# Patient Record
Sex: FEMALE | Race: WHITE | NOT HISPANIC OR LATINO | Employment: OTHER | URBAN - METROPOLITAN AREA
[De-identification: names, ages, dates, MRNs, and addresses within clinical notes are randomized per-mention and may not be internally consistent; named-entity substitution may affect disease eponyms.]

---

## 2017-01-09 ENCOUNTER — OFFICE VISIT - HEALTHEAST (OUTPATIENT)
Dept: FAMILY MEDICINE | Facility: CLINIC | Age: 62
End: 2017-01-09

## 2017-01-09 ENCOUNTER — COMMUNICATION - HEALTHEAST (OUTPATIENT)
Dept: FAMILY MEDICINE | Facility: CLINIC | Age: 62
End: 2017-01-09

## 2017-01-09 DIAGNOSIS — I10 ESSENTIAL HYPERTENSION: ICD-10-CM

## 2017-01-09 DIAGNOSIS — E66.01 MORBID OBESITY (H): ICD-10-CM

## 2017-01-09 DIAGNOSIS — R73.02 IMPAIRED GLUCOSE TOLERANCE: ICD-10-CM

## 2017-01-09 DIAGNOSIS — Z00.00 HEALTH CARE MAINTENANCE: ICD-10-CM

## 2017-01-09 DIAGNOSIS — J34.89 NASAL VESTIBULITIS: ICD-10-CM

## 2017-01-09 DIAGNOSIS — D12.6 BENIGN NEOPLASM OF COLON: ICD-10-CM

## 2017-01-09 DIAGNOSIS — E11.9 DIABETES (H): ICD-10-CM

## 2017-01-09 LAB — HBA1C MFR BLD: 6.4 % (ref 3.5–6)

## 2017-01-09 ASSESSMENT — MIFFLIN-ST. JEOR: SCORE: 1661.62

## 2017-01-15 ENCOUNTER — COMMUNICATION - HEALTHEAST (OUTPATIENT)
Dept: FAMILY MEDICINE | Facility: CLINIC | Age: 62
End: 2017-01-15

## 2017-02-14 ENCOUNTER — AMBULATORY - HEALTHEAST (OUTPATIENT)
Dept: PODIATRY | Age: 62
End: 2017-02-14

## 2017-02-14 DIAGNOSIS — L60.2 ONYCHAUXIS: ICD-10-CM

## 2017-02-14 DIAGNOSIS — E11.42 TYPE 2 DIABETES MELLITUS WITH DIABETIC POLYNEUROPATHY, WITHOUT LONG-TERM CURRENT USE OF INSULIN (H): ICD-10-CM

## 2017-02-14 DIAGNOSIS — M79.673 PAIN OF FOOT, UNSPECIFIED LATERALITY: ICD-10-CM

## 2017-02-25 ENCOUNTER — COMMUNICATION - HEALTHEAST (OUTPATIENT)
Dept: FAMILY MEDICINE | Facility: CLINIC | Age: 62
End: 2017-02-25

## 2017-02-25 DIAGNOSIS — I10 ESSENTIAL HYPERTENSION: ICD-10-CM

## 2017-02-26 ENCOUNTER — RECORDS - HEALTHEAST (OUTPATIENT)
Dept: ADMINISTRATIVE | Facility: OTHER | Age: 62
End: 2017-02-26

## 2017-03-03 ENCOUNTER — OFFICE VISIT - HEALTHEAST (OUTPATIENT)
Dept: FAMILY MEDICINE | Facility: CLINIC | Age: 62
End: 2017-03-03

## 2017-03-03 ENCOUNTER — COMMUNICATION - HEALTHEAST (OUTPATIENT)
Dept: FAMILY MEDICINE | Facility: CLINIC | Age: 62
End: 2017-03-03

## 2017-03-03 DIAGNOSIS — T21.22XA: ICD-10-CM

## 2017-03-03 DIAGNOSIS — E11.9 TYPE 2 DIABETES MELLITUS (H): ICD-10-CM

## 2017-03-03 ASSESSMENT — MIFFLIN-ST. JEOR: SCORE: 1635.82

## 2017-05-27 ENCOUNTER — COMMUNICATION - HEALTHEAST (OUTPATIENT)
Dept: FAMILY MEDICINE | Facility: CLINIC | Age: 62
End: 2017-05-27

## 2017-05-27 DIAGNOSIS — E78.00 HYPERCHOLESTEREMIA: ICD-10-CM

## 2017-05-31 ENCOUNTER — OFFICE VISIT - HEALTHEAST (OUTPATIENT)
Dept: OTOLARYNGOLOGY | Facility: CLINIC | Age: 62
End: 2017-05-31

## 2017-05-31 DIAGNOSIS — J31.0 CHRONIC RHINITIS: ICD-10-CM

## 2017-05-31 RX ORDER — FEXOFENADINE HCL 60 MG/1
60 TABLET, FILM COATED ORAL DAILY
Status: SHIPPED | COMMUNITY
Start: 2017-05-31

## 2017-05-31 ASSESSMENT — MIFFLIN-ST. JEOR: SCORE: 1633.84

## 2017-06-20 ENCOUNTER — AMBULATORY - HEALTHEAST (OUTPATIENT)
Dept: PODIATRY | Age: 62
End: 2017-06-20

## 2017-06-20 DIAGNOSIS — E11.42 TYPE 2 DIABETES MELLITUS WITH DIABETIC POLYNEUROPATHY, WITHOUT LONG-TERM CURRENT USE OF INSULIN (H): ICD-10-CM

## 2017-06-20 DIAGNOSIS — L60.2 ONYCHAUXIS: ICD-10-CM

## 2017-06-20 DIAGNOSIS — M79.673 PAIN OF FOOT, UNSPECIFIED LATERALITY: ICD-10-CM

## 2017-06-28 ENCOUNTER — COMMUNICATION - HEALTHEAST (OUTPATIENT)
Dept: FAMILY MEDICINE | Facility: CLINIC | Age: 62
End: 2017-06-28

## 2017-06-28 DIAGNOSIS — I10 HTN (HYPERTENSION): ICD-10-CM

## 2017-07-20 ENCOUNTER — COMMUNICATION - HEALTHEAST (OUTPATIENT)
Dept: FAMILY MEDICINE | Facility: CLINIC | Age: 62
End: 2017-07-20

## 2017-07-26 ENCOUNTER — COMMUNICATION - HEALTHEAST (OUTPATIENT)
Dept: FAMILY MEDICINE | Facility: CLINIC | Age: 62
End: 2017-07-26

## 2017-07-26 DIAGNOSIS — I10 HTN (HYPERTENSION): ICD-10-CM

## 2017-07-26 DIAGNOSIS — I10 ESSENTIAL HYPERTENSION: ICD-10-CM

## 2017-08-27 ENCOUNTER — COMMUNICATION - HEALTHEAST (OUTPATIENT)
Dept: FAMILY MEDICINE | Facility: CLINIC | Age: 62
End: 2017-08-27

## 2017-08-27 DIAGNOSIS — E78.00 HYPERCHOLESTEREMIA: ICD-10-CM

## 2017-08-27 DIAGNOSIS — I10 ESSENTIAL HYPERTENSION: ICD-10-CM

## 2017-08-27 DIAGNOSIS — I10 HTN (HYPERTENSION): ICD-10-CM

## 2017-08-29 ENCOUNTER — AMBULATORY - HEALTHEAST (OUTPATIENT)
Dept: PODIATRY | Age: 62
End: 2017-08-29

## 2017-08-29 DIAGNOSIS — E11.42 TYPE 2 DIABETES MELLITUS WITH DIABETIC POLYNEUROPATHY, WITHOUT LONG-TERM CURRENT USE OF INSULIN (H): ICD-10-CM

## 2017-08-29 DIAGNOSIS — M79.673 PAIN OF FOOT, UNSPECIFIED LATERALITY: ICD-10-CM

## 2017-08-29 DIAGNOSIS — L60.2 ONYCHAUXIS: ICD-10-CM

## 2017-08-29 ASSESSMENT — MIFFLIN-ST. JEOR: SCORE: 1627.49

## 2017-09-25 ENCOUNTER — COMMUNICATION - HEALTHEAST (OUTPATIENT)
Dept: FAMILY MEDICINE | Facility: CLINIC | Age: 62
End: 2017-09-25

## 2017-09-25 DIAGNOSIS — I10 ESSENTIAL HYPERTENSION: ICD-10-CM

## 2017-09-25 DIAGNOSIS — I10 HTN (HYPERTENSION): ICD-10-CM

## 2017-09-25 DIAGNOSIS — E78.00 HYPERCHOLESTEREMIA: ICD-10-CM

## 2017-10-23 ENCOUNTER — COMMUNICATION - HEALTHEAST (OUTPATIENT)
Dept: FAMILY MEDICINE | Facility: CLINIC | Age: 62
End: 2017-10-23

## 2017-10-23 ENCOUNTER — OFFICE VISIT - HEALTHEAST (OUTPATIENT)
Dept: FAMILY MEDICINE | Facility: CLINIC | Age: 62
End: 2017-10-23

## 2017-10-23 DIAGNOSIS — I10 HTN (HYPERTENSION): ICD-10-CM

## 2017-10-23 DIAGNOSIS — E78.00 HYPERCHOLESTEREMIA: ICD-10-CM

## 2017-10-23 DIAGNOSIS — F20.0 PARANOID SCHIZOPHRENIA (H): ICD-10-CM

## 2017-10-23 DIAGNOSIS — I10 ESSENTIAL HYPERTENSION: ICD-10-CM

## 2017-10-23 DIAGNOSIS — E66.01 MORBID OBESITY (H): ICD-10-CM

## 2017-10-23 DIAGNOSIS — R73.03 PREDIABETES: ICD-10-CM

## 2017-10-23 DIAGNOSIS — R09.81 CHRONIC NASAL CONGESTION: ICD-10-CM

## 2017-10-23 DIAGNOSIS — L30.9 ECZEMA, UNSPECIFIED TYPE: ICD-10-CM

## 2017-10-23 LAB
CHOLEST SERPL-MCNC: 149 MG/DL
FASTING STATUS PATIENT QL REPORTED: NO
HDLC SERPL-MCNC: 39 MG/DL
LDLC SERPL CALC-MCNC: 79 MG/DL
TRIGL SERPL-MCNC: 155 MG/DL

## 2017-11-02 ENCOUNTER — HOSPITAL ENCOUNTER (OUTPATIENT)
Dept: CT IMAGING | Facility: HOSPITAL | Age: 62
Discharge: HOME | End: 2017-11-02
Attending: FAMILY MEDICINE

## 2017-11-02 ENCOUNTER — COMMUNICATION - HEALTHEAST (OUTPATIENT)
Dept: FAMILY MEDICINE | Facility: CLINIC | Age: 62
End: 2017-11-02

## 2017-11-02 DIAGNOSIS — R09.81 CHRONIC NASAL CONGESTION: ICD-10-CM

## 2017-11-09 ENCOUNTER — COMMUNICATION - HEALTHEAST (OUTPATIENT)
Dept: SURGERY | Facility: CLINIC | Age: 62
End: 2017-11-09

## 2017-11-09 ENCOUNTER — COMMUNICATION - HEALTHEAST (OUTPATIENT)
Dept: OTOLARYNGOLOGY | Facility: CLINIC | Age: 62
End: 2017-11-09

## 2017-11-09 ENCOUNTER — COMMUNICATION - HEALTHEAST (OUTPATIENT)
Dept: FAMILY MEDICINE | Facility: CLINIC | Age: 62
End: 2017-11-09

## 2017-11-28 ENCOUNTER — AMBULATORY - HEALTHEAST (OUTPATIENT)
Dept: PODIATRY | Age: 62
End: 2017-11-28

## 2017-11-28 DIAGNOSIS — L60.2 ONYCHAUXIS: ICD-10-CM

## 2017-11-28 DIAGNOSIS — E11.42 TYPE 2 DIABETES MELLITUS WITH DIABETIC POLYNEUROPATHY, WITHOUT LONG-TERM CURRENT USE OF INSULIN (H): ICD-10-CM

## 2017-11-28 DIAGNOSIS — M79.673 PAIN OF FOOT, UNSPECIFIED LATERALITY: ICD-10-CM

## 2017-11-28 ASSESSMENT — MIFFLIN-ST. JEOR: SCORE: 1629.3

## 2017-11-29 ENCOUNTER — COMMUNICATION - HEALTHEAST (OUTPATIENT)
Dept: OTOLARYNGOLOGY | Facility: CLINIC | Age: 62
End: 2017-11-29

## 2017-11-29 DIAGNOSIS — J32.3 SPHENOID SINUSITIS, UNSPECIFIED CHRONICITY: ICD-10-CM

## 2018-01-22 ENCOUNTER — COMMUNICATION - HEALTHEAST (OUTPATIENT)
Dept: SURGERY | Facility: CLINIC | Age: 63
End: 2018-01-22

## 2018-02-06 ENCOUNTER — AMBULATORY - HEALTHEAST (OUTPATIENT)
Dept: PODIATRY | Age: 63
End: 2018-02-06

## 2018-02-06 DIAGNOSIS — L60.2 ONYCHAUXIS: ICD-10-CM

## 2018-02-06 DIAGNOSIS — E11.42 TYPE 2 DIABETES MELLITUS WITH DIABETIC POLYNEUROPATHY, WITHOUT LONG-TERM CURRENT USE OF INSULIN (H): ICD-10-CM

## 2018-02-06 DIAGNOSIS — M79.673 PAIN OF FOOT, UNSPECIFIED LATERALITY: ICD-10-CM

## 2018-02-18 ENCOUNTER — COMMUNICATION - HEALTHEAST (OUTPATIENT)
Dept: FAMILY MEDICINE | Facility: CLINIC | Age: 63
End: 2018-02-18

## 2018-02-18 DIAGNOSIS — R73.03 PREDIABETES: ICD-10-CM

## 2018-02-25 ENCOUNTER — COMMUNICATION - HEALTHEAST (OUTPATIENT)
Dept: SCHEDULING | Facility: CLINIC | Age: 63
End: 2018-02-25

## 2018-02-27 ENCOUNTER — COMMUNICATION - HEALTHEAST (OUTPATIENT)
Dept: FAMILY MEDICINE | Facility: CLINIC | Age: 63
End: 2018-02-27

## 2018-02-27 DIAGNOSIS — R73.03 PREDIABETES: ICD-10-CM

## 2018-03-07 ENCOUNTER — OFFICE VISIT - HEALTHEAST (OUTPATIENT)
Dept: OTOLARYNGOLOGY | Facility: CLINIC | Age: 63
End: 2018-03-07

## 2018-03-07 DIAGNOSIS — J34.89 NASAL VESTIBULITIS: ICD-10-CM

## 2018-04-16 ENCOUNTER — COMMUNICATION - HEALTHEAST (OUTPATIENT)
Dept: FAMILY MEDICINE | Facility: CLINIC | Age: 63
End: 2018-04-16

## 2018-05-08 ENCOUNTER — AMBULATORY - HEALTHEAST (OUTPATIENT)
Dept: PODIATRY | Age: 63
End: 2018-05-08

## 2018-05-08 DIAGNOSIS — M79.673 PAIN OF FOOT, UNSPECIFIED LATERALITY: ICD-10-CM

## 2018-05-08 DIAGNOSIS — E11.42 TYPE 2 DIABETES MELLITUS WITH DIABETIC POLYNEUROPATHY, WITHOUT LONG-TERM CURRENT USE OF INSULIN (H): ICD-10-CM

## 2018-05-08 DIAGNOSIS — L60.2 ONYCHAUXIS: ICD-10-CM

## 2018-05-08 ASSESSMENT — MIFFLIN-ST. JEOR: SCORE: 1624.77

## 2018-05-14 ENCOUNTER — OFFICE VISIT - HEALTHEAST (OUTPATIENT)
Dept: FAMILY MEDICINE | Facility: CLINIC | Age: 63
End: 2018-05-14

## 2018-05-14 DIAGNOSIS — I10 ESSENTIAL HYPERTENSION: ICD-10-CM

## 2018-05-14 DIAGNOSIS — D12.6 BENIGN NEOPLASM OF COLON: ICD-10-CM

## 2018-05-14 DIAGNOSIS — Z12.11 SCREEN FOR COLON CANCER: ICD-10-CM

## 2018-05-14 DIAGNOSIS — R73.03 PREDIABETES: ICD-10-CM

## 2018-05-14 DIAGNOSIS — Z12.31 VISIT FOR SCREENING MAMMOGRAM: ICD-10-CM

## 2018-05-14 DIAGNOSIS — E66.01 MORBID OBESITY (H): ICD-10-CM

## 2018-05-14 LAB
CREAT UR-MCNC: 29.4 MG/DL
MICROALBUMIN UR-MCNC: <0.5 MG/DL (ref 0–1.99)
MICROALBUMIN/CREAT UR: NORMAL MG/G

## 2018-05-24 ENCOUNTER — COMMUNICATION - HEALTHEAST (OUTPATIENT)
Dept: FAMILY MEDICINE | Facility: CLINIC | Age: 63
End: 2018-05-24

## 2018-06-08 ENCOUNTER — HOSPITAL ENCOUNTER (OUTPATIENT)
Dept: MAMMOGRAPHY | Facility: CLINIC | Age: 63
Discharge: HOME | End: 2018-06-08
Attending: FAMILY MEDICINE

## 2018-06-08 DIAGNOSIS — Z12.31 VISIT FOR SCREENING MAMMOGRAM: ICD-10-CM

## 2018-10-08 ENCOUNTER — COMMUNICATION - HEALTHEAST (OUTPATIENT)
Dept: FAMILY MEDICINE | Facility: CLINIC | Age: 63
End: 2018-10-08

## 2018-10-08 DIAGNOSIS — E78.00 HYPERCHOLESTEREMIA: ICD-10-CM

## 2018-10-12 ENCOUNTER — COMMUNICATION - HEALTHEAST (OUTPATIENT)
Dept: FAMILY MEDICINE | Facility: CLINIC | Age: 63
End: 2018-10-12

## 2018-10-12 DIAGNOSIS — I10 ESSENTIAL HYPERTENSION: ICD-10-CM

## 2018-10-12 DIAGNOSIS — I10 HTN (HYPERTENSION): ICD-10-CM

## 2018-11-19 ENCOUNTER — OFFICE VISIT - HEALTHEAST (OUTPATIENT)
Dept: FAMILY MEDICINE | Facility: CLINIC | Age: 63
End: 2018-11-19

## 2018-11-19 ENCOUNTER — COMMUNICATION - HEALTHEAST (OUTPATIENT)
Dept: FAMILY MEDICINE | Facility: CLINIC | Age: 63
End: 2018-11-19

## 2018-11-19 DIAGNOSIS — R73.03 PREDIABETES: ICD-10-CM

## 2018-11-19 DIAGNOSIS — J34.89 IRRITATION OF NOSE: ICD-10-CM

## 2018-11-19 DIAGNOSIS — F20.9 SCHIZOPHRENIA, CHRONIC CONDITION (H): ICD-10-CM

## 2018-11-19 DIAGNOSIS — E66.01 MORBID OBESITY (H): ICD-10-CM

## 2018-11-19 DIAGNOSIS — I10 ESSENTIAL HYPERTENSION: ICD-10-CM

## 2018-11-19 LAB — HBA1C MFR BLD: 6.4 % (ref 3.5–6)

## 2019-01-15 ENCOUNTER — COMMUNICATION - HEALTHEAST (OUTPATIENT)
Dept: FAMILY MEDICINE | Facility: CLINIC | Age: 64
End: 2019-01-15

## 2019-03-13 ENCOUNTER — RECORDS - HEALTHEAST (OUTPATIENT)
Dept: ADMINISTRATIVE | Facility: OTHER | Age: 64
End: 2019-03-13

## 2019-03-20 ENCOUNTER — RECORDS - HEALTHEAST (OUTPATIENT)
Dept: HEALTH INFORMATION MANAGEMENT | Facility: CLINIC | Age: 64
End: 2019-03-20

## 2019-04-01 ENCOUNTER — COMMUNICATION - HEALTHEAST (OUTPATIENT)
Dept: FAMILY MEDICINE | Facility: CLINIC | Age: 64
End: 2019-04-01

## 2019-04-01 DIAGNOSIS — I10 ESSENTIAL HYPERTENSION: ICD-10-CM

## 2019-04-01 DIAGNOSIS — E78.00 HYPERCHOLESTEREMIA: ICD-10-CM

## 2019-04-01 DIAGNOSIS — I10 HTN (HYPERTENSION): ICD-10-CM

## 2019-04-08 ENCOUNTER — AMBULATORY - HEALTHEAST (OUTPATIENT)
Dept: MULTI SPECIALTY CLINIC | Facility: CLINIC | Age: 64
End: 2019-04-08

## 2019-04-08 LAB — HBA1C MFR BLD: 6.2 % (ref 0–5.6)

## 2019-04-18 ENCOUNTER — COMMUNICATION - HEALTHEAST (OUTPATIENT)
Dept: FAMILY MEDICINE | Facility: CLINIC | Age: 64
End: 2019-04-18

## 2019-04-30 ENCOUNTER — COMMUNICATION - HEALTHEAST (OUTPATIENT)
Dept: FAMILY MEDICINE | Facility: CLINIC | Age: 64
End: 2019-04-30

## 2019-04-30 DIAGNOSIS — I10 HTN (HYPERTENSION): ICD-10-CM

## 2019-04-30 DIAGNOSIS — E78.00 HYPERCHOLESTEREMIA: ICD-10-CM

## 2019-05-02 ENCOUNTER — RECORDS - HEALTHEAST (OUTPATIENT)
Dept: ADMINISTRATIVE | Facility: OTHER | Age: 64
End: 2019-05-02

## 2019-05-02 ENCOUNTER — COMMUNICATION - HEALTHEAST (OUTPATIENT)
Dept: FAMILY MEDICINE | Facility: CLINIC | Age: 64
End: 2019-05-02

## 2019-05-02 DIAGNOSIS — I10 ESSENTIAL HYPERTENSION: ICD-10-CM

## 2019-05-20 ENCOUNTER — COMMUNICATION - HEALTHEAST (OUTPATIENT)
Dept: FAMILY MEDICINE | Facility: CLINIC | Age: 64
End: 2019-05-20

## 2019-07-01 ENCOUNTER — COMMUNICATION - HEALTHEAST (OUTPATIENT)
Dept: FAMILY MEDICINE | Facility: CLINIC | Age: 64
End: 2019-07-01

## 2019-07-01 DIAGNOSIS — I10 HTN (HYPERTENSION): ICD-10-CM

## 2019-07-10 ENCOUNTER — COMMUNICATION - HEALTHEAST (OUTPATIENT)
Dept: FAMILY MEDICINE | Facility: CLINIC | Age: 64
End: 2019-07-10

## 2019-07-15 ENCOUNTER — COMMUNICATION - HEALTHEAST (OUTPATIENT)
Dept: SCHEDULING | Facility: CLINIC | Age: 64
End: 2019-07-15

## 2019-07-15 ENCOUNTER — AMBULATORY - HEALTHEAST (OUTPATIENT)
Dept: FAMILY MEDICINE | Facility: CLINIC | Age: 64
End: 2019-07-15

## 2019-07-15 ENCOUNTER — COMMUNICATION - HEALTHEAST (OUTPATIENT)
Dept: FAMILY MEDICINE | Facility: CLINIC | Age: 64
End: 2019-07-15

## 2019-07-15 ENCOUNTER — AMBULATORY - HEALTHEAST (OUTPATIENT)
Dept: NURSING | Facility: CLINIC | Age: 64
End: 2019-07-15

## 2019-07-15 DIAGNOSIS — E78.5 HYPERLIPIDEMIA, UNSPECIFIED HYPERLIPIDEMIA TYPE: ICD-10-CM

## 2019-07-15 DIAGNOSIS — I10 ESSENTIAL HYPERTENSION: ICD-10-CM

## 2019-07-15 DIAGNOSIS — E78.00 HYPERCHOLESTEREMIA: ICD-10-CM

## 2019-07-30 ENCOUNTER — COMMUNICATION - HEALTHEAST (OUTPATIENT)
Dept: FAMILY MEDICINE | Facility: CLINIC | Age: 64
End: 2019-07-30

## 2019-07-30 DIAGNOSIS — I10 ESSENTIAL HYPERTENSION: ICD-10-CM

## 2019-07-30 DIAGNOSIS — I10 HTN (HYPERTENSION): ICD-10-CM

## 2019-08-28 ENCOUNTER — COMMUNICATION - HEALTHEAST (OUTPATIENT)
Dept: FAMILY MEDICINE | Facility: CLINIC | Age: 64
End: 2019-08-28

## 2019-08-28 DIAGNOSIS — I10 HTN (HYPERTENSION): ICD-10-CM

## 2019-08-28 DIAGNOSIS — I10 ESSENTIAL HYPERTENSION: ICD-10-CM

## 2019-10-02 ENCOUNTER — COMMUNICATION - HEALTHEAST (OUTPATIENT)
Dept: FAMILY MEDICINE | Facility: CLINIC | Age: 64
End: 2019-10-02

## 2019-10-02 DIAGNOSIS — I10 HTN (HYPERTENSION): ICD-10-CM

## 2019-10-02 DIAGNOSIS — I10 ESSENTIAL HYPERTENSION: ICD-10-CM

## 2019-10-05 ENCOUNTER — COMMUNICATION - HEALTHEAST (OUTPATIENT)
Dept: FAMILY MEDICINE | Facility: CLINIC | Age: 64
End: 2019-10-05

## 2019-10-05 DIAGNOSIS — E78.00 HYPERCHOLESTEREMIA: ICD-10-CM

## 2019-10-14 ENCOUNTER — OFFICE VISIT - HEALTHEAST (OUTPATIENT)
Dept: FAMILY MEDICINE | Facility: CLINIC | Age: 64
End: 2019-10-14

## 2019-10-14 DIAGNOSIS — E66.01 MORBID OBESITY (H): ICD-10-CM

## 2019-10-14 DIAGNOSIS — Z76.89 ESTABLISHING CARE WITH NEW DOCTOR, ENCOUNTER FOR: ICD-10-CM

## 2019-10-14 DIAGNOSIS — R25.1 SHAKINESS: ICD-10-CM

## 2019-10-14 DIAGNOSIS — R73.03 PREDIABETES: ICD-10-CM

## 2019-10-14 DIAGNOSIS — R09.81 CHRONIC NASAL CONGESTION: ICD-10-CM

## 2019-10-14 DIAGNOSIS — L30.9 ECZEMA, UNSPECIFIED TYPE: ICD-10-CM

## 2019-10-14 DIAGNOSIS — I10 ESSENTIAL HYPERTENSION: ICD-10-CM

## 2019-10-14 DIAGNOSIS — K42.9 UMBILICAL HERNIA WITHOUT OBSTRUCTION AND WITHOUT GANGRENE: ICD-10-CM

## 2019-10-14 DIAGNOSIS — E78.00 HYPERCHOLESTEREMIA: ICD-10-CM

## 2019-10-14 DIAGNOSIS — Z12.11 SCREEN FOR COLON CANCER: ICD-10-CM

## 2019-10-14 DIAGNOSIS — J30.2 SEASONAL ALLERGIC RHINITIS, UNSPECIFIED TRIGGER: ICD-10-CM

## 2019-10-14 DIAGNOSIS — F20.9 SCHIZOPHRENIA, CHRONIC CONDITION (H): ICD-10-CM

## 2019-10-14 LAB
ALBUMIN SERPL-MCNC: 3.9 G/DL (ref 3.5–5)
ALP SERPL-CCNC: 79 U/L (ref 45–120)
ALT SERPL W P-5'-P-CCNC: 26 U/L (ref 0–45)
ANION GAP SERPL CALCULATED.3IONS-SCNC: 7 MMOL/L (ref 5–18)
AST SERPL W P-5'-P-CCNC: 22 U/L (ref 0–40)
BILIRUB SERPL-MCNC: 0.5 MG/DL (ref 0–1)
BUN SERPL-MCNC: 15 MG/DL (ref 8–22)
CALCIUM SERPL-MCNC: 9.9 MG/DL (ref 8.5–10.5)
CHLORIDE BLD-SCNC: 97 MMOL/L (ref 98–107)
CHOLEST SERPL-MCNC: 151 MG/DL
CO2 SERPL-SCNC: 31 MMOL/L (ref 22–31)
CREAT SERPL-MCNC: 0.9 MG/DL (ref 0.6–1.1)
CREAT UR-MCNC: 35.2 MG/DL
ERYTHROCYTE [DISTWIDTH] IN BLOOD BY AUTOMATED COUNT: 12.7 % (ref 11–14.5)
FASTING STATUS PATIENT QL REPORTED: YES
FERRITIN SERPL-MCNC: 367 NG/ML (ref 10–130)
GFR SERPL CREATININE-BSD FRML MDRD: >60 ML/MIN/1.73M2
GLUCOSE BLD-MCNC: 113 MG/DL (ref 70–125)
HBA1C MFR BLD: 6.1 % (ref 3.5–6)
HCT VFR BLD AUTO: 43.2 % (ref 35–47)
HDLC SERPL-MCNC: 46 MG/DL
HGB BLD-MCNC: 14.5 G/DL (ref 12–16)
LDLC SERPL CALC-MCNC: 80 MG/DL
MCH RBC QN AUTO: 29.6 PG (ref 27–34)
MCHC RBC AUTO-ENTMCNC: 33.5 G/DL (ref 32–36)
MCV RBC AUTO: 88 FL (ref 80–100)
MICROALBUMIN UR-MCNC: <0.5 MG/DL (ref 0–1.99)
MICROALBUMIN/CREAT UR: NORMAL MG/G{CREAT}
PLATELET # BLD AUTO: 255 THOU/UL (ref 140–440)
PMV BLD AUTO: 7.7 FL (ref 7–10)
POTASSIUM BLD-SCNC: 3.9 MMOL/L (ref 3.5–5)
PROT SERPL-MCNC: 7 G/DL (ref 6–8)
RBC # BLD AUTO: 4.88 MILL/UL (ref 3.8–5.4)
SODIUM SERPL-SCNC: 135 MMOL/L (ref 136–145)
TRIGL SERPL-MCNC: 125 MG/DL
WBC: 7.8 THOU/UL (ref 4–11)

## 2019-10-14 ASSESSMENT — MIFFLIN-ST. JEOR: SCORE: 1586.66

## 2019-10-18 ENCOUNTER — COMMUNICATION - HEALTHEAST (OUTPATIENT)
Dept: FAMILY MEDICINE | Facility: CLINIC | Age: 64
End: 2019-10-18

## 2019-11-04 ENCOUNTER — COMMUNICATION - HEALTHEAST (OUTPATIENT)
Dept: FAMILY MEDICINE | Facility: CLINIC | Age: 64
End: 2019-11-04

## 2019-11-04 DIAGNOSIS — I10 ESSENTIAL HYPERTENSION: ICD-10-CM

## 2019-11-04 DIAGNOSIS — I10 HTN (HYPERTENSION): ICD-10-CM

## 2019-11-06 ENCOUNTER — COMMUNICATION - HEALTHEAST (OUTPATIENT)
Dept: FAMILY MEDICINE | Facility: CLINIC | Age: 64
End: 2019-11-06

## 2019-11-06 DIAGNOSIS — E78.00 HYPERCHOLESTEREMIA: ICD-10-CM

## 2020-01-23 ENCOUNTER — COMMUNICATION - HEALTHEAST (OUTPATIENT)
Dept: FAMILY MEDICINE | Facility: CLINIC | Age: 65
End: 2020-01-23

## 2020-01-23 DIAGNOSIS — I10 HTN (HYPERTENSION): ICD-10-CM

## 2020-04-08 ENCOUNTER — COMMUNICATION - HEALTHEAST (OUTPATIENT)
Dept: SCHEDULING | Facility: CLINIC | Age: 65
End: 2020-04-08

## 2020-08-21 ENCOUNTER — OFFICE VISIT - HEALTHEAST (OUTPATIENT)
Dept: FAMILY MEDICINE | Facility: CLINIC | Age: 65
End: 2020-08-21

## 2020-08-21 DIAGNOSIS — I10 ESSENTIAL HYPERTENSION: ICD-10-CM

## 2020-08-21 DIAGNOSIS — R73.03 PREDIABETES: ICD-10-CM

## 2020-08-21 DIAGNOSIS — Z11.59 ENCOUNTER FOR HEPATITIS C SCREENING TEST FOR LOW RISK PATIENT: ICD-10-CM

## 2020-08-21 DIAGNOSIS — E66.01 MORBID OBESITY (H): ICD-10-CM

## 2020-08-21 DIAGNOSIS — E78.00 HYPERCHOLESTEREMIA: ICD-10-CM

## 2020-08-21 DIAGNOSIS — I10 HTN (HYPERTENSION): ICD-10-CM

## 2020-08-21 DIAGNOSIS — F20.9 SCHIZOPHRENIA, CHRONIC CONDITION (H): ICD-10-CM

## 2020-08-21 LAB
ALBUMIN SERPL-MCNC: 3.7 G/DL (ref 3.5–5)
ALP SERPL-CCNC: 79 U/L (ref 45–120)
ALT SERPL W P-5'-P-CCNC: 24 U/L (ref 0–45)
ANION GAP SERPL CALCULATED.3IONS-SCNC: 13 MMOL/L (ref 5–18)
AST SERPL W P-5'-P-CCNC: 23 U/L (ref 0–40)
BILIRUB SERPL-MCNC: 0.4 MG/DL (ref 0–1)
BUN SERPL-MCNC: 13 MG/DL (ref 8–22)
CALCIUM SERPL-MCNC: 9.4 MG/DL (ref 8.5–10.5)
CHLORIDE BLD-SCNC: 98 MMOL/L (ref 98–107)
CO2 SERPL-SCNC: 27 MMOL/L (ref 22–31)
CREAT SERPL-MCNC: 0.8 MG/DL (ref 0.6–1.1)
GFR SERPL CREATININE-BSD FRML MDRD: >60 ML/MIN/1.73M2
GLUCOSE BLD-MCNC: 108 MG/DL (ref 70–125)
HBA1C MFR BLD: 6.1 %
POTASSIUM BLD-SCNC: 3.8 MMOL/L (ref 3.5–5)
PROT SERPL-MCNC: 6.6 G/DL (ref 6–8)
SODIUM SERPL-SCNC: 138 MMOL/L (ref 136–145)

## 2020-08-24 ENCOUNTER — COMMUNICATION - HEALTHEAST (OUTPATIENT)
Dept: FAMILY MEDICINE | Facility: CLINIC | Age: 65
End: 2020-08-24

## 2020-08-24 LAB — HCV AB SERPL QL IA: NEGATIVE

## 2020-10-16 ENCOUNTER — OFFICE VISIT - HEALTHEAST (OUTPATIENT)
Dept: FAMILY MEDICINE | Facility: CLINIC | Age: 65
End: 2020-10-16

## 2020-10-16 DIAGNOSIS — F20.9 SCHIZOPHRENIA, CHRONIC CONDITION (H): ICD-10-CM

## 2020-10-16 DIAGNOSIS — I10 ESSENTIAL HYPERTENSION: ICD-10-CM

## 2020-10-16 DIAGNOSIS — R73.03 PREDIABETES: ICD-10-CM

## 2020-10-16 DIAGNOSIS — E66.01 MORBID OBESITY (H): ICD-10-CM

## 2020-10-16 DIAGNOSIS — Z00.00 ENCOUNTER FOR PREVENTATIVE ADULT HEALTH CARE EXAMINATION: ICD-10-CM

## 2020-10-16 ASSESSMENT — MIFFLIN-ST. JEOR: SCORE: 1592.55

## 2020-10-29 ENCOUNTER — AMBULATORY - HEALTHEAST (OUTPATIENT)
Dept: NURSING | Facility: CLINIC | Age: 65
End: 2020-10-29

## 2020-10-30 ENCOUNTER — COMMUNICATION - HEALTHEAST (OUTPATIENT)
Dept: FAMILY MEDICINE | Facility: CLINIC | Age: 65
End: 2020-10-30

## 2020-11-13 ENCOUNTER — AMBULATORY - HEALTHEAST (OUTPATIENT)
Dept: NURSING | Facility: CLINIC | Age: 65
End: 2020-11-13

## 2020-11-20 ENCOUNTER — COMMUNICATION - HEALTHEAST (OUTPATIENT)
Dept: FAMILY MEDICINE | Facility: CLINIC | Age: 65
End: 2020-11-20

## 2021-02-11 ENCOUNTER — COMMUNICATION - HEALTHEAST (OUTPATIENT)
Dept: FAMILY MEDICINE | Facility: CLINIC | Age: 66
End: 2021-02-11

## 2021-02-11 DIAGNOSIS — I10 ESSENTIAL HYPERTENSION: ICD-10-CM

## 2021-02-11 DIAGNOSIS — E78.00 HYPERCHOLESTEREMIA: ICD-10-CM

## 2021-02-11 RX ORDER — PRAVASTATIN SODIUM 40 MG
TABLET ORAL
Qty: 90 TABLET | Refills: 2 | Status: SHIPPED | OUTPATIENT
Start: 2021-02-11 | End: 2021-11-24

## 2021-02-11 RX ORDER — HYDROCHLOROTHIAZIDE 25 MG/1
TABLET ORAL
Qty: 90 TABLET | Refills: 2 | Status: SHIPPED | OUTPATIENT
Start: 2021-02-11 | End: 2021-11-24

## 2021-03-30 ENCOUNTER — COMMUNICATION - HEALTHEAST (OUTPATIENT)
Dept: FAMILY MEDICINE | Facility: CLINIC | Age: 66
End: 2021-03-30

## 2021-04-05 ENCOUNTER — OFFICE VISIT - HEALTHEAST (OUTPATIENT)
Dept: FAMILY MEDICINE | Facility: CLINIC | Age: 66
End: 2021-04-05

## 2021-04-05 DIAGNOSIS — F20.9 SCHIZOPHRENIA, CHRONIC CONDITION (H): ICD-10-CM

## 2021-04-05 DIAGNOSIS — E66.01 MORBID OBESITY (H): ICD-10-CM

## 2021-04-05 DIAGNOSIS — J34.89 CHRONIC NASAL DISCHARGE: ICD-10-CM

## 2021-04-05 DIAGNOSIS — R09.81 NASAL CONGESTION: ICD-10-CM

## 2021-04-05 DIAGNOSIS — I10 ESSENTIAL HYPERTENSION: ICD-10-CM

## 2021-04-05 DIAGNOSIS — E11.42 TYPE 2 DIABETES MELLITUS WITH DIABETIC POLYNEUROPATHY, WITHOUT LONG-TERM CURRENT USE OF INSULIN (H): ICD-10-CM

## 2021-04-05 LAB
ALBUMIN SERPL-MCNC: 3.7 G/DL (ref 3.5–5)
ALP SERPL-CCNC: 83 U/L (ref 45–120)
ALT SERPL W P-5'-P-CCNC: 28 U/L (ref 0–45)
ANION GAP SERPL CALCULATED.3IONS-SCNC: 13 MMOL/L (ref 5–18)
AST SERPL W P-5'-P-CCNC: 25 U/L (ref 0–40)
BILIRUB SERPL-MCNC: 0.4 MG/DL (ref 0–1)
BUN SERPL-MCNC: 15 MG/DL (ref 8–22)
CALCIUM SERPL-MCNC: 9 MG/DL (ref 8.5–10.5)
CHLORIDE BLD-SCNC: 97 MMOL/L (ref 98–107)
CO2 SERPL-SCNC: 25 MMOL/L (ref 22–31)
CREAT SERPL-MCNC: 0.74 MG/DL (ref 0.6–1.1)
GFR SERPL CREATININE-BSD FRML MDRD: >60 ML/MIN/1.73M2
GLUCOSE BLD-MCNC: 100 MG/DL (ref 70–125)
HBA1C MFR BLD: 6 %
POTASSIUM BLD-SCNC: 3.5 MMOL/L (ref 3.5–5)
PROT SERPL-MCNC: 6.5 G/DL (ref 6–8)
SODIUM SERPL-SCNC: 135 MMOL/L (ref 136–145)

## 2021-04-06 ENCOUNTER — COMMUNICATION - HEALTHEAST (OUTPATIENT)
Dept: FAMILY MEDICINE | Facility: CLINIC | Age: 66
End: 2021-04-06

## 2021-04-06 LAB
PATIENT'S ETHNICITY: NORMAL
PATIENT'S RACE: NORMAL
SARS-COV-2 AB SERPL QL IA: NEGATIVE

## 2021-04-07 ENCOUNTER — COMMUNICATION - HEALTHEAST (OUTPATIENT)
Dept: FAMILY MEDICINE | Facility: CLINIC | Age: 66
End: 2021-04-07

## 2021-04-07 ENCOUNTER — COMMUNICATION - HEALTHEAST (OUTPATIENT)
Dept: SCHEDULING | Facility: CLINIC | Age: 66
End: 2021-04-07

## 2021-04-08 ENCOUNTER — COMMUNICATION - HEALTHEAST (OUTPATIENT)
Dept: FAMILY MEDICINE | Facility: CLINIC | Age: 66
End: 2021-04-08

## 2021-04-20 ENCOUNTER — AMBULATORY - HEALTHEAST (OUTPATIENT)
Dept: NURSING | Facility: CLINIC | Age: 66
End: 2021-04-20

## 2021-04-20 DIAGNOSIS — I10 ESSENTIAL HYPERTENSION: ICD-10-CM

## 2021-04-26 ENCOUNTER — COMMUNICATION - HEALTHEAST (OUTPATIENT)
Dept: FAMILY MEDICINE | Facility: CLINIC | Age: 66
End: 2021-04-26

## 2021-04-26 DIAGNOSIS — I10 ESSENTIAL HYPERTENSION: ICD-10-CM

## 2021-04-26 RX ORDER — LOSARTAN POTASSIUM 50 MG/1
100 TABLET ORAL DAILY
Qty: 180 TABLET | Refills: 3 | Status: SHIPPED | OUTPATIENT
Start: 2021-04-26 | End: 2022-04-07

## 2021-05-14 ENCOUNTER — COMMUNICATION - HEALTHEAST (OUTPATIENT)
Dept: FAMILY MEDICINE | Facility: CLINIC | Age: 66
End: 2021-05-14

## 2021-05-14 DIAGNOSIS — J34.89 CHRONIC NASAL DISCHARGE: ICD-10-CM

## 2021-05-14 DIAGNOSIS — R09.81 NASAL CONGESTION: ICD-10-CM

## 2021-05-20 ENCOUNTER — RECORDS - HEALTHEAST (OUTPATIENT)
Dept: ADMINISTRATIVE | Facility: OTHER | Age: 66
End: 2021-05-20

## 2021-05-20 ENCOUNTER — OFFICE VISIT - HEALTHEAST (OUTPATIENT)
Dept: FAMILY MEDICINE | Facility: CLINIC | Age: 66
End: 2021-05-20

## 2021-05-20 DIAGNOSIS — J34.89 CHRONIC NASAL DISCHARGE: ICD-10-CM

## 2021-05-20 DIAGNOSIS — R09.81 NASAL CONGESTION: ICD-10-CM

## 2021-05-20 DIAGNOSIS — I10 ESSENTIAL HYPERTENSION: ICD-10-CM

## 2021-05-20 RX ORDER — RISPERIDONE 4 MG/1
4 TABLET ORAL DAILY
Status: SHIPPED | COMMUNITY
Start: 2021-05-04

## 2021-05-27 VITALS
SYSTOLIC BLOOD PRESSURE: 150 MMHG | RESPIRATION RATE: 20 BRPM | DIASTOLIC BLOOD PRESSURE: 61 MMHG | HEART RATE: 86 BPM | SYSTOLIC BLOOD PRESSURE: 149 MMHG | DIASTOLIC BLOOD PRESSURE: 68 MMHG | HEART RATE: 84 BPM

## 2021-05-27 VITALS — WEIGHT: 247 LBS

## 2021-05-27 NOTE — TELEPHONE ENCOUNTER
Upcoming Appointment Question  When is the appointment: 052019  What is your appointment for?: Diabetic check  Who is your appointment scheduled with?: PCP only  What is your question/concern?: Patient explained that she got her blood drawn through [email or web address] on 04-08-19, and her A1C was 6.3, so patient is wondering if she needs to still come in for this appointment. Patient informed that she can still feel toes and bottom of feet.  Patient will plan to forward the paperwork/results once she gets them from [email or web address].  Okay to leave a detailed message?: No, but patient will be home all day today and home on Monday. Patient will not be home tomorrow.  If you cannot reach her by phone, then please send patient a letter to her address in this chart. Thanks.

## 2021-05-27 NOTE — TELEPHONE ENCOUNTER
RN cannot approve Refill Request    RN can NOT refill this medication PCP messaged that patient is overdue for Labs.       Ariana Masters, [facility] Triage/Med Refill 4/2/2019    Requested Prescriptions   Pending Prescriptions Disp Refills     losartan (COZAAR) 50 MG tablet [Pharmacy Med Name: Losartan Potassium Oral Tablet 50 MG] 30 tablet 0     Sig: Take 1 tablet (50 mg total) by mouth daily.    Angiotensin Receptor Blocker Protocol Failed - 4/1/2019  1:40 PM       Failed - Serum potassium within the past 12 months    No results found for: LN-POTASSIUM         Failed - Serum creatinine within the past 12 months    Creatinine   Date Value Ref Range Status   10/23/2017 0.86 0.60 - 1.10 mg/dL Final            Passed - PCP or prescribing provider visit in past 12 months      Last office visit with prescriber/PCP: 11/19/2018 Alyson Del Real MD OR same dept: 11/19/2018 Alyson Del Real MD OR same specialty: 11/19/2018 Alyson Del Real MD  Last physical: 8/10/2015 Last MTM visit: Visit date not found   Next visit within 3 mo: Visit date not found  Next physical within 3 mo: Visit date not found  Prescriber OR PCP: Alyson Del Real MD  Last diagnosis associated with med order: 1. Essential hypertension  - losartan (COZAAR) 50 MG tablet [Pharmacy Med Name: Losartan Potassium Oral Tablet 50 MG]; Take 1 tablet (50 mg total) by mouth daily.  Dispense: 30 tablet; Refill: 0    2. HTN (hypertension)  - hydroCHLOROthiazide (HYDRODIURIL) 50 MG tablet [Pharmacy Med Name: hydroCHLOROthiazide Oral Tablet 50 MG]; TAKE 1 TABLET BY MOUTH DAILY.  Dispense: 30 tablet; Refill: 0    3. Hypercholesteremia  - fluvastatin (LESCOL XL) 80 mg 24 hr tablet [Pharmacy Med Name: Fluvastatin Sodium ER Oral Tablet Extended Release 24 Hour 80 MG]; TAKE 1 TABLET  (80 MG) BY MOUTH DAILY.  Dispense: 30 tablet; Refill: 0    If protocol passes may refill for 12 months if within 3 months of last provider visit (or a total of 15 months).            Passed -  Blood pressure filed in past 12 months    BP Readings from Last 1 Encounters:   05/14/18 136/74             hydroCHLOROthiazide (HYDRODIURIL) 50 MG tablet [Pharmacy Med Name: hydroCHLOROthiazide Oral Tablet 50 MG] 30 tablet 0     Sig: TAKE 1 TABLET BY MOUTH DAILY.    Diuretics/Combination Diuretics Refill Protocol  Failed - 4/1/2019  1:40 PM       Failed - Serum Potassium in past 12 months     No results found for: LN-POTASSIUM         Failed - Serum Sodium in past 12 months     No results found for: LN-SODIUM         Failed - Serum Creatinine in past 12 months     Creatinine   Date Value Ref Range Status   10/23/2017 0.86 0.60 - 1.10 mg/dL Final            Passed - Visit with PCP or prescribing provider visit in past 12 months    Last office visit with prescriber/PCP: 11/19/2018 Alyson Del Real MD OR same dept: 11/19/2018 Alyson Del Real MD OR same specialty: 11/19/2018 Alyson Del Real MD  Last physical: 8/10/2015 Last MTM visit: Visit date not found   Next visit within 3 mo: Visit date not found  Next physical within 3 mo: Visit date not found  Prescriber OR PCP: Alyson Del Real MD  Last diagnosis associated with med order: 1. Essential hypertension  - losartan (COZAAR) 50 MG tablet [Pharmacy Med Name: Losartan Potassium Oral Tablet 50 MG]; Take 1 tablet (50 mg total) by mouth daily.  Dispense: 30 tablet; Refill: 0    2. HTN (hypertension)  - hydroCHLOROthiazide (HYDRODIURIL) 50 MG tablet [Pharmacy Med Name: hydroCHLOROthiazide Oral Tablet 50 MG]; TAKE 1 TABLET BY MOUTH DAILY.  Dispense: 30 tablet; Refill: 0    3. Hypercholesteremia  - fluvastatin (LESCOL XL) 80 mg 24 hr tablet [Pharmacy Med Name: Fluvastatin Sodium ER Oral Tablet Extended Release 24 Hour 80 MG]; TAKE 1 TABLET  (80 MG) BY MOUTH DAILY.  Dispense: 30 tablet; Refill: 0    If protocol passes may refill for 12 months if within 3 months of last provider visit (or a total of 15 months).            Passed - Blood pressure on file in past 12 months    BP  Readings from Last 1 Encounters:   05/14/18 136/74             fluvastatin (LESCOL XL) 80 mg 24 hr tablet [Pharmacy Med Name: Fluvastatin Sodium ER Oral Tablet Extended Release 24 Hour 80 MG] 30 tablet 0     Sig: TAKE 1 TABLET  (80 MG) BY MOUTH DAILY.    Statins Refill Protocol (Hmg CoA Reductase Inhibitors) Passed - 4/1/2019  1:40 PM       Passed - PCP or prescribing provider visit in past 12 months     Last office visit with prescriber/PCP: 11/19/2018 Alyson Del Real MD OR same dept: 11/19/2018 Alyson Del Real MD OR same specialty: 11/19/2018 Alyson Del Real MD  Last physical: 8/10/2015 Last MTM visit: Visit date not found   Next visit within 3 mo: Visit date not found  Next physical within 3 mo: Visit date not found  Prescriber OR PCP: Alyson Del Real MD  Last diagnosis associated with med order: 1. Essential hypertension  - losartan (COZAAR) 50 MG tablet [Pharmacy Med Name: Losartan Potassium Oral Tablet 50 MG]; Take 1 tablet (50 mg total) by mouth daily.  Dispense: 30 tablet; Refill: 0    2. HTN (hypertension)  - hydroCHLOROthiazide (HYDRODIURIL) 50 MG tablet [Pharmacy Med Name: hydroCHLOROthiazide Oral Tablet 50 MG]; TAKE 1 TABLET BY MOUTH DAILY.  Dispense: 30 tablet; Refill: 0    3. Hypercholesteremia  - fluvastatin (LESCOL XL) 80 mg 24 hr tablet [Pharmacy Med Name: Fluvastatin Sodium ER Oral Tablet Extended Release 24 Hour 80 MG]; TAKE 1 TABLET  (80 MG) BY MOUTH DAILY.  Dispense: 30 tablet; Refill: 0    If protocol passes may refill for 12 months if within 3 months of last provider visit (or a total of 15 months).

## 2021-05-28 NOTE — TELEPHONE ENCOUNTER
RN cannot approve Refill Request    RN can NOT refill this medication PCP messaged that patient is overdue for Labs. Last office visit: 11/19/2018 Alyson Del Real MD Last Physical: 8/10/2015 Last MTM visit: Visit date not found Last visit same specialty: 11/19/2018 Alyson Del Real MD.  Next visit within 3 mo: Visit date not found  Next physical within 3 mo: Visit date not found      Ariana Masters, [facility] Triage/Med Refill 5/1/2019    Requested Prescriptions   Pending Prescriptions Disp Refills     hydroCHLOROthiazide (HYDRODIURIL) 50 MG tablet [Pharmacy Med Name: hydroCHLOROthiazide Oral Tablet 50 MG] 30 tablet 5     Sig: TAKE 1 TABLET BY MOUTH once DAILY       Diuretics/Combination Diuretics Refill Protocol  Failed - 4/30/2019 10:43 AM        Failed - Serum Potassium in past 12 months      No results found for: LN-POTASSIUM          Failed - Serum Sodium in past 12 months      No results found for: LN-SODIUM          Failed - Serum Creatinine in past 12 months      Creatinine   Date Value Ref Range Status   10/23/2017 0.86 0.60 - 1.10 mg/dL Final             Passed - Visit with PCP or prescribing provider visit in past 12 months     Last office visit with prescriber/PCP: 11/19/2018 Alyson Del Real MD OR same dept: 11/19/2018 Alyson Del Real MD OR same specialty: 11/19/2018 Alyson Del Real MD  Last physical: 8/10/2015 Last MTM visit: Visit date not found   Next visit within 3 mo: Visit date not found  Next physical within 3 mo: Visit date not found  Prescriber OR PCP: Alyson Del Real MD  Last diagnosis associated with med order: 1. HTN (hypertension)  - hydroCHLOROthiazide (HYDRODIURIL) 50 MG tablet [Pharmacy Med Name: hydroCHLOROthiazide Oral Tablet 50 MG]; TAKE 1 TABLET BY MOUTH once DAILY  Dispense: 30 tablet; Refill: 0    2. Hypercholesteremia  - fluvastatin (LESCOL XL) 80 mg 24 hr tablet; TAKE 1 TABLET  (80 MG) BY MOUTH once DAILY  Dispense: 30 tablet; Refill: 5    If protocol passes may refill for 12 months  if within 3 months of last provider visit (or a total of 15 months).             Passed - Blood pressure on file in past 12 months     BP Readings from Last 1 Encounters:   05/14/18 136/74           Signed Prescriptions Disp Refills    fluvastatin (LESCOL XL) 80 mg 24 hr tablet 30 tablet 5     Sig: TAKE 1 TABLET  (80 MG) BY MOUTH once DAILY       Statins Refill Protocol (Hmg CoA Reductase Inhibitors) Passed - 4/30/2019 10:43 AM        Passed - PCP or prescribing provider visit in past 12 months      Last office visit with prescriber/PCP: 11/19/2018 Alyson Del Real MD OR same dept: 11/19/2018 Alyson Del Real MD OR same specialty: 11/19/2018 Alyson Del Real MD  Last physical: 8/10/2015 Last MTM visit: Visit date not found   Next visit within 3 mo: Visit date not found  Next physical within 3 mo: Visit date not found  Prescriber OR PCP: Alyson Del Real MD  Last diagnosis associated with med order: 1. HTN (hypertension)  - hydroCHLOROthiazide (HYDRODIURIL) 50 MG tablet [Pharmacy Med Name: hydroCHLOROthiazide Oral Tablet 50 MG]; TAKE 1 TABLET BY MOUTH once DAILY  Dispense: 30 tablet; Refill: 0    2. Hypercholesteremia  - fluvastatin (LESCOL XL) 80 mg 24 hr tablet; TAKE 1 TABLET  (80 MG) BY MOUTH once DAILY  Dispense: 30 tablet; Refill: 5    If protocol passes may refill for 12 months if within 3 months of last provider visit (or a total of 15 months).

## 2021-05-28 NOTE — TELEPHONE ENCOUNTER
No need for appt.  Pt has PREdiabetes; can do fasting glucose at next visit---or it sounds like she is getting her care at [facility] now??

## 2021-05-28 NOTE — TELEPHONE ENCOUNTER
First Attempt: I tried calling patient to relay Dr Del Real's message. NO answer or recorder but patient does not want a message left. Will try calling again later.

## 2021-05-28 NOTE — TELEPHONE ENCOUNTER
RN cannot approve Refill Request    RN can NOT refill this medication overdue for office visits and/or labs.    Fred Merino, [facility] Triage/Med Refill 5/2/2019    Requested Prescriptions   Pending Prescriptions Disp Refills     losartan (COZAAR) 50 MG tablet 30 tablet 1     Sig: Take 1 tablet (50 mg total) by mouth daily.       Angiotensin Receptor Blocker Protocol Failed - 5/2/2019  8:04 AM        Failed - Serum potassium within the past 12 months     No results found for: LN-POTASSIUM          Failed - Serum creatinine within the past 12 months     Creatinine   Date Value Ref Range Status   10/23/2017 0.86 0.60 - 1.10 mg/dL Final             Passed - PCP or prescribing provider visit in past 12 months       Last office visit with prescriber/PCP: 11/19/2018 Alyson Del Real MD OR same dept: 11/19/2018 Alyson Del Real MD OR same specialty: 11/19/2018 Alyson Del Real MD  Last physical: 8/10/2015 Last MTM visit: Visit date not found   Next visit within 3 mo: Visit date not found  Next physical within 3 mo: Visit date not found  Prescriber OR PCP: Alyson Del Real MD  Last diagnosis associated with med order: 1. Essential hypertension  - losartan (COZAAR) 50 MG tablet; Take 1 tablet (50 mg total) by mouth daily.  Dispense: 30 tablet; Refill: 1    If protocol passes may refill for 12 months if within 3 months of last provider visit (or a total of 15 months).             Passed - Blood pressure filed in past 12 months     BP Readings from Last 1 Encounters:   05/14/18 136/74

## 2021-05-28 NOTE — TELEPHONE ENCOUNTER
Refill Approved    Rx renewed per Medication Renewal Policy. Medication was last renewed on 4/2/19.    Ariana Masters, [facility] Triage/Med Refill 5/1/2019     Requested Prescriptions   Pending Prescriptions Disp Refills     hydroCHLOROthiazide (HYDRODIURIL) 50 MG tablet [Pharmacy Med Name: hydroCHLOROthiazide Oral Tablet 50 MG] 30 tablet 0     Sig: TAKE 1 TABLET BY MOUTH once DAILY       Diuretics/Combination Diuretics Refill Protocol  Failed - 4/30/2019 10:43 AM        Failed - Serum Potassium in past 12 months      No results found for: LN-POTASSIUM          Failed - Serum Sodium in past 12 months      No results found for: LN-SODIUM          Failed - Serum Creatinine in past 12 months      Creatinine   Date Value Ref Range Status   10/23/2017 0.86 0.60 - 1.10 mg/dL Final             Passed - Visit with PCP or prescribing provider visit in past 12 months     Last office visit with prescriber/PCP: 11/19/2018 Alyson Del Real MD OR same dept: 11/19/2018 Alyson Del Real MD OR same specialty: 11/19/2018 Alyson Del Real MD  Last physical: 8/10/2015 Last MTM visit: Visit date not found   Next visit within 3 mo: Visit date not found  Next physical within 3 mo: Visit date not found  Prescriber OR PCP: Alyson Del Real MD  Last diagnosis associated with med order: 1. HTN (hypertension)  - hydroCHLOROthiazide (HYDRODIURIL) 50 MG tablet [Pharmacy Med Name: hydroCHLOROthiazide Oral Tablet 50 MG]; TAKE 1 TABLET BY MOUTH once DAILY  Dispense: 30 tablet; Refill: 0    2. Hypercholesteremia  - fluvastatin (LESCOL XL) 80 mg 24 hr tablet [Pharmacy Med Name: Fluvastatin Sodium ER Oral Tablet Extended Release 24 Hour 80 MG]; TAKE 1 TABLET  (80 MG) BY MOUTH once DAILY  Dispense: 30 tablet; Refill: 0    If protocol passes may refill for 12 months if within 3 months of last provider visit (or a total of 15 months).             Passed - Blood pressure on file in past 12 months     BP Readings from Last 1 Encounters:   05/14/18 136/74              fluvastatin (LESCOL XL) 80 mg 24 hr tablet [Pharmacy Med Name: Fluvastatin Sodium ER Oral Tablet Extended Release 24 Hour 80 MG] 30 tablet 0     Sig: TAKE 1 TABLET  (80 MG) BY MOUTH once DAILY       Statins Refill Protocol (Hmg CoA Reductase Inhibitors) Passed - 4/30/2019 10:43 AM        Passed - PCP or prescribing provider visit in past 12 months      Last office visit with prescriber/PCP: 11/19/2018 Alyson Del Real MD OR same dept: 11/19/2018 Alyson Del Real MD OR same specialty: 11/19/2018 Alyson Del Real MD  Last physical: 8/10/2015 Last MTM visit: Visit date not found   Next visit within 3 mo: Visit date not found  Next physical within 3 mo: Visit date not found  Prescriber OR PCP: Alyson Del Real MD  Last diagnosis associated with med order: 1. HTN (hypertension)  - hydroCHLOROthiazide (HYDRODIURIL) 50 MG tablet [Pharmacy Med Name: hydroCHLOROthiazide Oral Tablet 50 MG]; TAKE 1 TABLET BY MOUTH once DAILY  Dispense: 30 tablet; Refill: 0    2. Hypercholesteremia  - fluvastatin (LESCOL XL) 80 mg 24 hr tablet [Pharmacy Med Name: Fluvastatin Sodium ER Oral Tablet Extended Release 24 Hour 80 MG]; TAKE 1 TABLET  (80 MG) BY MOUTH once DAILY  Dispense: 30 tablet; Refill: 0    If protocol passes may refill for 12 months if within 3 months of last provider visit (or a total of 15 months).

## 2021-05-28 NOTE — TELEPHONE ENCOUNTER
Postponing until Monday, 4/22. Pt is not home today (Friday) and does not want us to leave a message for her. Call pt on Monday when she is home to let her know per Dr. Del Real, upcoming appt on 5/20 is not needed. Also, ask pt if she is getting care at [facility] now? If so, we may have to update her chart. If not, let pt know that she can do a fasting glucose at next visit.

## 2021-05-30 VITALS — BODY MASS INDEX: 46.27 KG/M2 | WEIGHT: 251.44 LBS | HEIGHT: 62 IN

## 2021-05-30 VITALS — BODY MASS INDEX: 47.32 KG/M2 | WEIGHT: 257.13 LBS | HEIGHT: 62 IN

## 2021-05-30 VITALS — HEIGHT: 62 IN | WEIGHT: 251 LBS | BODY MASS INDEX: 46.19 KG/M2

## 2021-05-30 NOTE — PROGRESS NOTES
BP looks good, continue same meds and follow-up twice yearly, one for med check and one for physical.  It looks like she is a former patient of Dr. Del Real's.  She should establish care with a new provider within 6 months of her last visit with Dr. Del Real.

## 2021-05-30 NOTE — TELEPHONE ENCOUNTER
Former patient of [facility] & has not established care with another provider.  Please assign refill request to covering provider per Clinic standard process.      RN cannot approve Refill Request    RN can NOT refill this medication Protocol failed and NO refill given.     Ariana Masters, [facility] Triage/Med Refill 7/2/2019    Requested Prescriptions   Pending Prescriptions Disp Refills     hydroCHLOROthiazide (HYDRODIURIL) 50 MG tablet [Pharmacy Med Name: hydroCHLOROthiazide Oral Tablet 50 MG] 30 tablet 0     Sig: TAKE 1 TABLET BY MOUTH once DAILY       Diuretics/Combination Diuretics Refill Protocol  Failed - 7/1/2019  8:21 PM        Failed - Serum Potassium in past 12 months      No results found for: LN-POTASSIUM          Failed - Serum Sodium in past 12 months      No results found for: LN-SODIUM          Failed - Blood pressure on file in past 12 months     BP Readings from Last 1 Encounters:   05/14/18 136/74             Failed - Serum Creatinine in past 12 months      Creatinine   Date Value Ref Range Status   10/23/2017 0.86 0.60 - 1.10 mg/dL Final             Passed - Visit with PCP or prescribing provider visit in past 12 months     Last office visit with prescriber/PCP: 11/19/2018 Alyson Del Real MD OR same dept: 11/19/2018 Alyson Del Real MD OR same specialty: 11/19/2018 Alyson Del Real MD  Last physical: 8/10/2015 Last MTM visit: Visit date not found   Next visit within 3 mo: Visit date not found  Next physical within 3 mo: Visit date not found  Prescriber OR PCP: Alyson Del Real MD  Last diagnosis associated with med order: 1. HTN (hypertension)  - hydroCHLOROthiazide (HYDRODIURIL) 50 MG tablet [Pharmacy Med Name: hydroCHLOROthiazide Oral Tablet 50 MG]; TAKE 1 TABLET BY MOUTH once DAILY  Dispense: 30 tablet; Refill: 0    If protocol passes may refill for 12 months if within 3 months of last provider visit (or a total of 15 months).

## 2021-05-30 NOTE — TELEPHONE ENCOUNTER
Please help pt schedule est care appt to review medications and alternatives before new med can be sent

## 2021-05-30 NOTE — TELEPHONE ENCOUNTER
Left message to call back for: returning call  Information to relay to patient:  See msg below. Please verify pt's ph# since current one only has busy signal. LVM per consent to communicate with son to call back. Please also refer to encounter nurse triage 7/15/19 for other pt question.

## 2021-05-30 NOTE — TELEPHONE ENCOUNTER
Thank you so much for checking with the pharmacist.  I will prescribe pravastatin 40 mg.  I will discontinue the a atorvastatin or Lipitor.  Thank you.

## 2021-05-30 NOTE — TELEPHONE ENCOUNTER
Pravastatin 40 mg or rosuvastatin 5 mg would be the most similar statins as they are also hydrophilic, and are in the equivalent doses to her fluvastatin 80 mg.    Please see what she would be willing to take.

## 2021-05-30 NOTE — TELEPHONE ENCOUNTER
"Caller is [facility] \"Arti\".  Pt is currently at pharmacy.  Has been waiting for Prior Auth oh fluvastatin; however apparently not completed per review of chart notes.    Pt does have pending appt to re-establish care with Dr Patel.  Also was indeed seen in clinic within the past 12 months.    Fluvastatin Rx now depleted as of July 13th.    Current PA request now being routed to PA team for review and processing.    Can pt receive PA as well as refill since she has been seen in clinic within the past 12 months, and does have pending appt to re-establish care with a new PCP ??    Sara Goddard RN BSBA  [facility] RN Triage     Reason for Disposition    Pharmacy calling with prescription questions and triager unable to answer question    Protocols used: MEDICATION QUESTION CALL-A-AH      "

## 2021-05-30 NOTE — TELEPHONE ENCOUNTER
Informed patient of providers message below. Patient was very confused of why they changed her medication. Tried to explain her insurance would not cover the cost of fluastatin 80 mg that she is currently taking. Patient became very upset about the change and confused. Patient states that she already called her mail order pharmacy and they gave her two other medications she could choose from. Informed patient that the pravastatin med was sent to [facility] pharmacy. Patient states she will  the new prescription.  Informed I would call patients son to inform him of the medication change. No further questions or concerns at this time.

## 2021-05-30 NOTE — TELEPHONE ENCOUNTER
Please clarify what medication she would like?  We have simvastatin, resuvastatin, lovastatin.  Thank you.

## 2021-05-30 NOTE — TELEPHONE ENCOUNTER
"Pt has appt to Establish care scheduled for 10- with Dr. Patel    Per encounter on 7/10/19 \"Patient stated she is having trouble filling her Rx for Fluvastatin.      Writer called and spoke to Cub. Fluvastatin requires a PA (PA request has already been sent to PA team).     Patient is requesting for an alternative but states she does not want Lipitor. Please advise on alternative.\"  "

## 2021-05-30 NOTE — TELEPHONE ENCOUNTER
Medication Question or Clarification  Who is calling: Patient  What medication are you calling about? (include dose and sig)   Disp Refills Start End     fluvastatin (LESCOL XL) 80 mg 24 hr tablet 30 tablet 5 5/1/2019     Sig: TAKE 1 TABLET  (80 MG) BY MOUTH once DAILY    Sent to pharmacy as: fluvastatin (LESCOL XL) 80 mg 24 hr tablet    E-Prescribing Status: Receipt confirmed by pharmacy (5/1/2019  1:05 PM CDT)      Who prescribed the medication?: Alyson Del Real MD   What is your question/concern?: Patient stated she is having trouble filling her Rx.     Writer called and spoke to [facility]. Fluvastatin requires a PA.    Patient is request for an alternative. Patient stated she does not want Lipitor. Please let her know what alternative was sent over.  Pharmacy: [id number]  Okay to leave a detailed message?: No  [phone]  Site CMT - Please call the pharmacy to obtain any additional needed information.

## 2021-05-30 NOTE — PROGRESS NOTES
I met with Rosalba Bernal at the request of Dr. Del Real to recheck her blood pressure.  Blood pressure medications on the MAR were reviewed with patient.    Patient has taken all medications as per usual regimen: Yes  Patient reports tolerating them without any issues or concerns: Yes    Vitals:    07/15/19 0951 07/15/19 1006   BP: 144/66 135/62   Patient Site: Right Arm Right Arm   Patient Position: Sitting Sitting   Cuff Size: Adult Large Adult Large   Pulse: 78 77       After 5 minutes, the patient's blood pressure was < 140/90, the previous encounter was reviewed, recorded blood pressure below 140/90.  Patient was discharged and the note will be sent to the provider for final review.

## 2021-05-30 NOTE — TELEPHONE ENCOUNTER
First, yes she does need to establish care with a new doctor.  She should be seen every 6 months because she has a diagnosis of hypertension on her chart.  She was seen for medication check in November so physical will be due anytime.    Next, it does look like the prior authorization request was placed today.  I am not quite sure why she is on that medication (fluvastatin 80 mg ), she could be on a different medication that would be covered by her insurance.  I am happy to place her on Lipitor now until her prior authorization goes through if she is wishing to stay on that medication.  We will send a prescription for Lipitor or atorvastatin 40 mg to take at bedtime #90 no refill.

## 2021-05-30 NOTE — TELEPHONE ENCOUNTER
----- Message from Christelle Tavarez MD sent at 7/15/2019 12:32 PM CDT -----    BP looks good, continue same meds and follow-up twice yearly, one for med check and one for physical.    ----- Message -----  From: Del Mckenzie MA  Sent: 7/15/2019  10:16 AM  To: Alyson Del Real MD    Covering Provider please refer to 7/15/19 encounter an med questions

## 2021-05-30 NOTE — TELEPHONE ENCOUNTER
Left message to call back for: returning call  Information to relay to patient:  See msg below. Please verify pt's ph# since current one only has busy signal. LVM per consent to communicate with son to call back. Please also refer to encounter clinical support 7/15/19 for other pt question.

## 2021-05-30 NOTE — TELEPHONE ENCOUNTER
Pt is here in clinic for BP check and is requesting a refill on this medication since she ran out on 7/14/19. Looks like a PA request was just put in today. Pt is requesting a refill on this med or one similar until the PA is approved and is requesting a Rx today.

## 2021-05-30 NOTE — TELEPHONE ENCOUNTER
Former patient of Gt & has not established care with another provider.  Please assign refill request to covering provider per Clinic standard process.      RN cannot approve Refill Request    RN can NOT refill this medication Protocol failed and NO refill given.       Ariana Masters, [facility] Triage/Med Refill 7/30/2019    Requested Prescriptions   Pending Prescriptions Disp Refills     hydroCHLOROthiazide (HYDRODIURIL) 50 MG tablet [Pharmacy Med Name: hydroCHLOROthiazide Oral Tablet 50 MG] 30 tablet 0     Sig: TAKE 1 TABLET BY MOUTH once DAILY       Diuretics/Combination Diuretics Refill Protocol  Failed - 7/30/2019  4:52 PM        Failed - Serum Potassium in past 12 months      No results found for: LN-POTASSIUM          Failed - Serum Sodium in past 12 months      No results found for: LN-SODIUM          Failed - Serum Creatinine in past 12 months      Creatinine   Date Value Ref Range Status   10/23/2017 0.86 0.60 - 1.10 mg/dL Final             Passed - Visit with PCP or prescribing provider visit in past 12 months     Last office visit with prescriber/PCP: Visit date not found OR same dept: 11/19/2018 Alyson Del Real MD OR same specialty: 11/19/2018 Alyson Del Real MD  Last physical: Visit date not found Last MTM visit: Visit date not found   Next visit within 3 mo: Visit date not found  Next physical within 3 mo: Visit date not found  Prescriber OR PCP: Yamilet Chinchilla DO  Last diagnosis associated with med order: 1. HTN (hypertension)  - hydroCHLOROthiazide (HYDRODIURIL) 50 MG tablet [Pharmacy Med Name: hydroCHLOROthiazide Oral Tablet 50 MG]; TAKE 1 TABLET BY MOUTH once DAILY  Dispense: 30 tablet; Refill: 0    If protocol passes may refill for 12 months if within 3 months of last provider visit (or a total of 15 months).             Passed - Blood pressure on file in past 12 months     BP Readings from Last 1 Encounters:   07/15/19 135/62

## 2021-05-30 NOTE — TELEPHONE ENCOUNTER
Former patient of Gt & has not established care with another provider.  Please assign refill request to covering provider per Clinic standard process.      RN cannot approve Refill Request    RN can NOT refill this medication Protocol failed and NO refill given.      Ariana Masters, [facility] Triage/Med Refill 7/30/2019    Requested Prescriptions   Pending Prescriptions Disp Refills     losartan (COZAAR) 50 MG tablet [Pharmacy Med Name: Losartan Potassium Oral Tablet 50 MG] 30 tablet 0     Sig: Take 1 tablet (50 mg) by mouth once daily       Angiotensin Receptor Blocker Protocol Failed - 7/30/2019  4:52 PM        Failed - Serum potassium within the past 12 months     No results found for: LN-POTASSIUM          Failed - Serum creatinine within the past 12 months     Creatinine   Date Value Ref Range Status   10/23/2017 0.86 0.60 - 1.10 mg/dL Final             Passed - PCP or prescribing provider visit in past 12 months       Last office visit with prescriber/PCP: 11/19/2018 Alyson Del Real MD OR same dept: 11/19/2018 Alyson Del Real MD OR same specialty: 11/19/2018 Alyson Del Real MD  Last physical: 8/10/2015 Last MTM visit: Visit date not found   Next visit within 3 mo: Visit date not found  Next physical within 3 mo: Visit date not found  Prescriber OR PCP: Alyson Del Real MD  Last diagnosis associated with med order: 1. Essential hypertension  - losartan (COZAAR) 50 MG tablet [Pharmacy Med Name: Losartan Potassium Oral Tablet 50 MG]; Take 1 tablet (50 mg) by mouth once daily  Dispense: 30 tablet; Refill: 0    If protocol passes may refill for 12 months if within 3 months of last provider visit (or a total of 15 months).             Passed - Blood pressure filed in past 12 months     BP Readings from Last 1 Encounters:   07/15/19 135/62

## 2021-05-31 VITALS — WEIGHT: 250 LBS | BODY MASS INDEX: 46.01 KG/M2 | HEIGHT: 62 IN

## 2021-05-31 VITALS — BODY MASS INDEX: 46.47 KG/M2 | WEIGHT: 250 LBS

## 2021-05-31 VITALS — BODY MASS INDEX: 45.93 KG/M2 | WEIGHT: 249.6 LBS | HEIGHT: 62 IN

## 2021-05-31 NOTE — TELEPHONE ENCOUNTER
Former patient of Gt & has not established care with another provider.  Please assign refill request to covering provider per Clinic standard process.      RN cannot approve Refill Request    RN can NOT refill this medication Protocol failed and NO refill given. Last office visit: 11/19/2018 Alyson Del Real MD Last Physical: 8/10/2015 Last MTM visit: Visit date not found Last visit same specialty: 11/19/2018 Alyson Del Real MD.  Next visit within 3 mo: Visit date not found  Next physical within 3 mo: Visit date not found      Libby Zuñiga, [facility] Triage/Med Refill 8/28/2019    Requested Prescriptions   Pending Prescriptions Disp Refills     losartan (COZAAR) 50 MG tablet 30 tablet 0     Sig: Take 1 tablet (50 mg) by mouth once daily       Angiotensin Receptor Blocker Protocol Failed - 8/28/2019  2:34 PM        Failed - Serum potassium within the past 12 months     No results found for: LN-POTASSIUM          Failed - Serum creatinine within the past 12 months     Creatinine   Date Value Ref Range Status   10/23/2017 0.86 0.60 - 1.10 mg/dL Final             Passed - PCP or prescribing provider visit in past 12 months       Last office visit with prescriber/PCP: 11/19/2018 Alyson Del Real MD OR same dept: 11/19/2018 Alyson Del Real MD OR same specialty: 11/19/2018 Alyson Del Real MD  Last physical: 8/10/2015 Last MTM visit: Visit date not found   Next visit within 3 mo: Visit date not found  Next physical within 3 mo: Visit date not found  Prescriber OR PCP: Alyson Del Real MD  Last diagnosis associated with med order: 1. Essential hypertension  - losartan (COZAAR) 50 MG tablet; Take 1 tablet (50 mg) by mouth once daily  Dispense: 30 tablet; Refill: 0    If protocol passes may refill for 12 months if within 3 months of last provider visit (or a total of 15 months).             Passed - Blood pressure filed in past 12 months     BP Readings from Last 1 Encounters:   07/15/19 135/62

## 2021-05-31 NOTE — TELEPHONE ENCOUNTER
RN cannot approve Refill Request    RN can NOT refill this medication Protocol failed and NO refill given. Last office visit: 11/19/2018 Alyson Del Real MD Last Physical: 8/10/2015 Last MTM visit: Visit date not found Last visit same specialty: 11/19/2018 Alyson Del Real MD.  Next visit within 3 mo: Visit date not found  Next physical within 3 mo: Visit date not found      Libby Zuñiga, [facility] Triage/Med Refill 8/28/2019    Requested Prescriptions   Pending Prescriptions Disp Refills     hydroCHLOROthiazide (HYDRODIURIL) 50 MG tablet 30 tablet 0     Sig: Take 1 tablet (50 mg total) by mouth daily.       Diuretics/Combination Diuretics Refill Protocol  Failed - 8/28/2019  2:34 PM        Failed - Serum Potassium in past 12 months      No results found for: LN-POTASSIUM          Failed - Serum Sodium in past 12 months      No results found for: LN-SODIUM          Failed - Serum Creatinine in past 12 months      Creatinine   Date Value Ref Range Status   10/23/2017 0.86 0.60 - 1.10 mg/dL Final             Passed - Visit with PCP or prescribing provider visit in past 12 months     Last office visit with prescriber/PCP: 11/19/2018 Alyson Del Real MD OR same dept: 11/19/2018 Alyson Del Real MD OR same specialty: 11/19/2018 Alyson Del Real MD  Last physical: 8/10/2015 Last MTM visit: Visit date not found   Next visit within 3 mo: Visit date not found  Next physical within 3 mo: Visit date not found  Prescriber OR PCP: Alyson Del Real MD  Last diagnosis associated with med order: 1. HTN (hypertension)  - hydroCHLOROthiazide (HYDRODIURIL) 50 MG tablet; Take 1 tablet (50 mg total) by mouth daily.  Dispense: 30 tablet; Refill: 0    If protocol passes may refill for 12 months if within 3 months of last provider visit (or a total of 15 months).             Passed - Blood pressure on file in past 12 months     BP Readings from Last 1 Encounters:   07/15/19 135/62

## 2021-06-01 ENCOUNTER — AMBULATORY - HEALTHEAST (OUTPATIENT)
Dept: NURSING | Facility: CLINIC | Age: 66
End: 2021-06-01

## 2021-06-01 VITALS — HEIGHT: 62 IN | BODY MASS INDEX: 45.82 KG/M2 | WEIGHT: 249 LBS

## 2021-06-01 VITALS — WEIGHT: 244.56 LBS | BODY MASS INDEX: 45.46 KG/M2

## 2021-06-01 DIAGNOSIS — I10 ESSENTIAL HYPERTENSION: ICD-10-CM

## 2021-06-01 NOTE — TELEPHONE ENCOUNTER
RN cannot approve Refill Request    RN can NOT refill this medication PCP messaged that patient is overdue for Labs. Last office visit: Visit date not found Last Physical: Visit date not found Last MTM visit: Visit date not found Last visit same specialty: 11/19/2018 Alyson Del Real MD.  Next visit within 3 mo: Visit date not found  Next physical within 3 mo: Visit date not found      Obdulia Medeiros, [facility] Triage/Med Refill 10/2/2019    Requested Prescriptions   Pending Prescriptions Disp Refills     losartan (COZAAR) 50 MG tablet [Pharmacy Med Name: Losartan Potassium Oral Tablet 50 MG] 30 tablet 0     Sig: Take 1 tablet (50 mg) by mouth once a day       Angiotensin Receptor Blocker Protocol Failed - 10/2/2019 12:01 PM        Failed - Serum potassium within the past 12 months     No results found for: LN-POTASSIUM          Failed - Serum creatinine within the past 12 months     Creatinine   Date Value Ref Range Status   10/23/2017 0.86 0.60 - 1.10 mg/dL Final             Passed - PCP or prescribing provider visit in past 12 months       Last office visit with prescriber/PCP: Visit date not found OR same dept: 11/19/2018 Alyson Del Real MD OR same specialty: 11/19/2018 Alyson Del Real MD  Last physical: Visit date not found Last MTM visit: Visit date not found   Next visit within 3 mo: Visit date not found  Next physical within 3 mo: Visit date not found  Prescriber OR PCP: Carlos Prieto MD  Last diagnosis associated with med order: 1. Essential hypertension  - losartan (COZAAR) 50 MG tablet [Pharmacy Med Name: Losartan Potassium Oral Tablet 50 MG]; Take 1 tablet (50 mg) by mouth once a day  Dispense: 30 tablet; Refill: 0    2. HTN (hypertension)  - hydroCHLOROthiazide (HYDRODIURIL) 50 MG tablet [Pharmacy Med Name: hydroCHLOROthiazide Oral Tablet 50 MG]; Take 1 tablet (50 mg) by mouth once a day  Dispense: 30 tablet; Refill: 0    If protocol passes may refill for 12 months if within 3 months of last  provider visit (or a total of 15 months).             Passed - Blood pressure filed in past 12 months     BP Readings from Last 1 Encounters:   07/15/19 135/62             hydroCHLOROthiazide (HYDRODIURIL) 50 MG tablet [Pharmacy Med Name: hydroCHLOROthiazide Oral Tablet 50 MG] 30 tablet 0     Sig: Take 1 tablet (50 mg) by mouth once a day       Diuretics/Combination Diuretics Refill Protocol  Failed - 10/2/2019 12:01 PM        Failed - Serum Potassium in past 12 months      No results found for: LN-POTASSIUM          Failed - Serum Sodium in past 12 months      No results found for: LN-SODIUM          Failed - Serum Creatinine in past 12 months      Creatinine   Date Value Ref Range Status   10/23/2017 0.86 0.60 - 1.10 mg/dL Final             Passed - Visit with PCP or prescribing provider visit in past 12 months     Last office visit with prescriber/PCP: Visit date not found OR same dept: 11/19/2018 Alyson Del Real MD OR same specialty: 11/19/2018 Alyson Del Real MD  Last physical: Visit date not found Last MTM visit: Visit date not found   Next visit within 3 mo: Visit date not found  Next physical within 3 mo: Visit date not found  Prescriber OR PCP: Carlos Prieto MD  Last diagnosis associated with med order: 1. Essential hypertension  - losartan (COZAAR) 50 MG tablet [Pharmacy Med Name: Losartan Potassium Oral Tablet 50 MG]; Take 1 tablet (50 mg) by mouth once a day  Dispense: 30 tablet; Refill: 0    2. HTN (hypertension)  - hydroCHLOROthiazide (HYDRODIURIL) 50 MG tablet [Pharmacy Med Name: hydroCHLOROthiazide Oral Tablet 50 MG]; Take 1 tablet (50 mg) by mouth once a day  Dispense: 30 tablet; Refill: 0    If protocol passes may refill for 12 months if within 3 months of last provider visit (or a total of 15 months).             Passed - Blood pressure on file in past 12 months     BP Readings from Last 1 Encounters:   07/15/19 135/62

## 2021-06-02 VITALS — WEIGHT: 244.27 LBS | BODY MASS INDEX: 45.41 KG/M2

## 2021-06-02 NOTE — PROGRESS NOTES
ASSESSMENT/ PLAN        Rosalba was seen today for establish care and diabetes.    Establishing care with new doctor, encounter for  Doesn't want mammogram this year. Has a hysterectomy history, no longer needing Pap. Due for colonoscopy but she's not ready for it yet. UTD with immunization. Doesn't want flu shot. Advanced directives given to her today (she wants to be full code but her son and her sister would her her healthcare agents and she will fill out the paperwork).     Prediabetes  a1C is 6.1. declines diabetes foot exam today.   -     Glycosylated Hemoglobin A1c  -     Microalbumin, Random Urine    Screen for colon cancer  She doesn't want to do colonoscopy right now.   -     Ambulatory referral for Colonoscopy    Hypercholesteremia  -     Lipid Mason FASTING    Morbid Obesity  She tries to walk but hasn't been able to do it as much. Does walk when she goes grocery shopping and goes to [employer]. She doesn't want to exercise. She cooks for herself (vegetables and salad). She does eat bread every other day. Has been dropping about 10 lbs in the last 2 years so she's working on her weight slowly.   -     Comprehensive Metabolic Panel    Hypertension  Well controlled.  Next refill of hydrochlorothiazide she will only get 25 mg daily.  -     Comprehensive Metabolic Panel    Schizophrenia, chronic condition (H)  Sees  psychiatry.     Chronic nasal congestion  Stable.  On Allegra.    Eczema, unspecified type  Advised cortisone on her hands.  Advised Vaseline.  Advised her to wear gloves.    Seasonal allergic rhinitis, unspecified trigger  Stable.    Shakiness  She constantly shakes her right leg.  -     HM2(CBC w/o Differential)  -     Ferritin    Umbilical hernia without obstruction and without gangrene  Not bothersome to patient.  Continue to monitor.    Greater than 45 minutes was spent today with patient ,more than 50% of time was counseling and coordination of care on the above issues      This note  was created using Dragon dictation software, spelling errors may occur.     Xenia Patel MD        SUBJECTIVE   Rosalbamicheal Bernal is a 63 y.o. old female who presented to clinic today for further evaluation of establishing care with a new provider.  She has a complex medical history including hypertension hyperlipidemia morbid obesity and schizophrenia.  She does not work.  She is .  She has 2 sons.  She lives alone in her apartment.  She is independent.  She declines diabetes foot exam  She's having dry skin on hands. She washes her hands a lot but doesn't use hand lotion. It's itchy. She has cracks on hands.   She sees  psychiatry Dr. Wlash for benadryl and Risperdal. Next visit 10/2019.       Review of Systems:  Negative except as noted in HPI      The following portions of the patient's history were reviewed and updated as appropriate: past medical history, past surgical history, family history, allergies, current medications and problem list.    Medical History  Active Ambulatory (Non-Hospital) Problems    Diagnosis     Umbilical hernia without obstruction and without gangrene     Sphenoid sinusitis     Lateral Epicondylitis     Hyperplastic Polyp Of The Large Intestine     Seasonal allergic rhinitis     Rhinitis     Intertrigo     Prediabetes     Morbid Obesity     Schizophrenia, chronic condition (H)     Hypertension     Residual type schizophrenic disorder, unspecified condition     Past Medical History:   Diagnosis Date     Diabetes mellitus (H)        Surgical History  She  has a past surgical history that includes pr removal of tonsils,<14 y/o; pr total abdom hysterectomy; and Hysterectomy.    Social History  Reviewed, and she  reports that she has quit smoking. She smoked 0.00 packs per day. She has never used smokeless tobacco. She reports current alcohol use. She reports previous drug use.    Family History  Reviewed, and family history includes Asthma in her brother; Breast cancer in her  "cousin; Cancer in her mother; Dementia in her father; Diabetes in her father; Hypertension in her father.    Medications  Reviewed and reconciled    Allergies  No Known Allergies      OBJECTIVE  Physical Exam:  Vital signs: /79 (Patient Site: Left Arm, Patient Position: Sitting, Cuff Size: Adult Large)   Pulse 91   Ht 5' 1.5\" (1.562 m)   Wt (!) 240 lb 9.6 oz (109.1 kg)   SpO2 95%   BMI 44.72 kg/m    Weight: (!) 240 lb 9.6 oz (109.1 kg)    General appearance: pleasant, appears stated age, cooperative and in no distress, morbidly obese  Eyes: EOMs intact, PERRL, conjunctivae normal.   ENT: moist oral mucosa, posterior oropharynx normal. Thyroid normal to palpation, no lump or mass or tenderness, no carotid bruit.  Lymph: no cervical/supraclavicular adenopathy  Respiratory: clear to auscultation bilaterally, good air movement throughout, no wheezing or crackles, speaking full sentences without difficulty  Cardiovascular: regular rate and rhythm, no murmur appreciated, no leg edema  Abdomen: active bowel sounds, soft, non-tender, non-distended, umbilical hernia appreciated.  Musculoskeletal: constantly moving her right leg.   Skin: no rashes, dry cracked skin on both hands, no cellulitis appreciated.  Neuro: alert oriented x 3, grossly normal otherwise  Psych: normal affect, appropriate conversation     "

## 2021-06-02 NOTE — TELEPHONE ENCOUNTER
Former patient of Gt & has not established care with another provider.  Please assign refill request to covering provider per Clinic standard process.      Refill Approved    Rx renewed per Medication Renewal Policy. Medication was last renewed on 7/15/19.    Ariana Masters, [facility] Connection Triage/Med Refill 10/7/2019     Requested Prescriptions   Pending Prescriptions Disp Refills     pravastatin (PRAVACHOL) 40 MG tablet [Pharmacy Med Name: Pravastatin Sodium Oral Tablet 40 MG] 30 tablet 0     Sig: Take 1 tablet (40 mg total) by mouth every evening.       Statins Refill Protocol (Hmg CoA Reductase Inhibitors) Passed - 10/5/2019  4:48 PM        Passed - PCP or prescribing provider visit in past 12 months      Last office visit with prescriber/PCP: Visit date not found OR same dept: 11/19/2018 Alyson Del Real MD OR same specialty: 11/19/2018 Alyson Del Real MD  Last physical: Visit date not found Last MTM visit: Visit date not found   Next visit within 3 mo: Visit date not found  Next physical within 3 mo: Visit date not found  Prescriber OR PCP: Christelle Tavarez MD  Last diagnosis associated with med order: 1. Hypercholesteremia  - pravastatin (PRAVACHOL) 40 MG tablet [Pharmacy Med Name: Pravastatin Sodium Oral Tablet 40 MG]; Take 1 tablet (40 mg total) by mouth every evening.  Dispense: 30 tablet; Refill: 0    If protocol passes may refill for 12 months if within 3 months of last provider visit (or a total of 15 months).

## 2021-06-03 ENCOUNTER — COMMUNICATION - HEALTHEAST (OUTPATIENT)
Dept: FAMILY MEDICINE | Facility: CLINIC | Age: 66
End: 2021-06-03

## 2021-06-03 VITALS
HEIGHT: 62 IN | SYSTOLIC BLOOD PRESSURE: 133 MMHG | DIASTOLIC BLOOD PRESSURE: 79 MMHG | BODY MASS INDEX: 44.27 KG/M2 | HEART RATE: 91 BPM | WEIGHT: 240.6 LBS | OXYGEN SATURATION: 95 %

## 2021-06-03 NOTE — TELEPHONE ENCOUNTER
Refill Approved    Rx renewed per Medication Renewal Policy. Medication was last renewed on 10/3/19.    Ariana Masters, [facility] Connection Triage/Med Refill 11/4/2019     Requested Prescriptions   Pending Prescriptions Disp Refills     hydroCHLOROthiazide (HYDRODIURIL) 50 MG tablet [Pharmacy Med Name: hydroCHLOROthiazide Oral Tablet 50 MG] 30 tablet 0     Sig: Take 1 tablet (50 mg) by mouth once a day       Diuretics/Combination Diuretics Refill Protocol  Passed - 11/4/2019  2:05 PM        Passed - Visit with PCP or prescribing provider visit in past 12 months     Last office visit with prescriber/PCP: Visit date not found OR same dept: 10/14/2019 Xenia Patel MD OR same specialty: 10/14/2019 Xenia Patel MD  Last physical: Visit date not found Last MTM visit: Visit date not found   Next visit within 3 mo: Visit date not found  Next physical within 3 mo: Visit date not found  Prescriber OR PCP: Christelle Tavarez MD  Last diagnosis associated with med order: 1. HTN (hypertension)  - hydroCHLOROthiazide (HYDRODIURIL) 50 MG tablet [Pharmacy Med Name: hydroCHLOROthiazide Oral Tablet 50 MG]; Take 1 tablet (50 mg) by mouth once a day  Dispense: 30 tablet; Refill: 0    2. Essential hypertension  - losartan (COZAAR) 50 MG tablet [Pharmacy Med Name: Losartan Potassium Oral Tablet 50 MG]; Take 1 tablet (50 mg) by mouth once a day  Dispense: 30 tablet; Refill: 0    If protocol passes may refill for 12 months if within 3 months of last provider visit (or a total of 15 months).             Passed - Serum Potassium in past 12 months      Lab Results   Component Value Date    Potassium 3.9 10/14/2019             Passed - Serum Sodium in past 12 months      Lab Results   Component Value Date    Sodium 135 (L) 10/14/2019             Passed - Blood pressure on file in past 12 months     BP Readings from Last 1 Encounters:   10/14/19 133/79             Passed - Serum Creatinine in past 12 months      Creatinine   Date Value  Ref Range Status   10/14/2019 0.90 0.60 - 1.10 mg/dL Final             losartan (COZAAR) 50 MG tablet [Pharmacy Med Name: Losartan Potassium Oral Tablet 50 MG] 30 tablet 0     Sig: Take 1 tablet (50 mg) by mouth once a day       Angiotensin Receptor Blocker Protocol Passed - 11/4/2019  2:05 PM        Passed - PCP or prescribing provider visit in past 12 months       Last office visit with prescriber/PCP: Visit date not found OR same dept: 10/14/2019 Xenia Patel MD OR same specialty: 10/14/2019 Xenia Patel MD  Last physical: Visit date not found Last MTM visit: Visit date not found   Next visit within 3 mo: Visit date not found  Next physical within 3 mo: Visit date not found  Prescriber OR PCP: Christelle Tavarez MD  Last diagnosis associated with med order: 1. HTN (hypertension)  - hydroCHLOROthiazide (HYDRODIURIL) 50 MG tablet [Pharmacy Med Name: hydroCHLOROthiazide Oral Tablet 50 MG]; Take 1 tablet (50 mg) by mouth once a day  Dispense: 30 tablet; Refill: 0    2. Essential hypertension  - losartan (COZAAR) 50 MG tablet [Pharmacy Med Name: Losartan Potassium Oral Tablet 50 MG]; Take 1 tablet (50 mg) by mouth once a day  Dispense: 30 tablet; Refill: 0    If protocol passes may refill for 12 months if within 3 months of last provider visit (or a total of 15 months).             Passed - Serum potassium within the past 12 months     Lab Results   Component Value Date    Potassium 3.9 10/14/2019             Passed - Blood pressure filed in past 12 months     BP Readings from Last 1 Encounters:   10/14/19 133/79             Passed - Serum creatinine within the past 12 months     Creatinine   Date Value Ref Range Status   10/14/2019 0.90 0.60 - 1.10 mg/dL Final

## 2021-06-03 NOTE — TELEPHONE ENCOUNTER
Refill Approved    Rx renewed per Medication Renewal Policy. Medication was last renewed on 10/10/19.    Ariana Masters, [facility] Triage/Med Refill 11/7/2019     Requested Prescriptions   Pending Prescriptions Disp Refills     pravastatin (PRAVACHOL) 40 MG tablet 30 tablet 0     Sig: Take 1 tablet (40 mg total) by mouth every evening.       Statins Refill Protocol (Hmg CoA Reductase Inhibitors) Passed - 11/6/2019  1:32 PM        Passed - PCP or prescribing provider visit in past 12 months      Last office visit with prescriber/PCP: 10/14/2019 Xenia Patel MD OR same dept: 10/14/2019 Xenia Patel MD OR same specialty: 10/14/2019 Xenia Patel MD  Last physical: Visit date not found Last MTM visit: Visit date not found   Next visit within 3 mo: Visit date not found  Next physical within 3 mo: Visit date not found  Prescriber OR PCP: Xenia Patel MD  Last diagnosis associated with med order: 1. Hypercholesteremia  - pravastatin (PRAVACHOL) 40 MG tablet; Take 1 tablet (40 mg total) by mouth every evening.  Dispense: 30 tablet; Refill: 0    If protocol passes may refill for 12 months if within 3 months of last provider visit (or a total of 15 months).

## 2021-06-03 NOTE — TELEPHONE ENCOUNTER
Refill Request  Did you contact pharmacy: No  Medication name:   Requested Prescriptions     Pending Prescriptions Disp Refills     pravastatin (PRAVACHOL) 40 MG tablet 30 tablet 0     Sig: Take 1 tablet (40 mg total) by mouth every evening.     Who prescribed the medication: Lencho Mcnair MD  Pharmacy Name and Location: [id number]  Is patient out of medication: n/a  Patient notified refills processed in 72 hours:  n/a  Okay to leave a detailed message: no

## 2021-06-05 NOTE — TELEPHONE ENCOUNTER
Refill Approved    Rx renewed per Medication Renewal Policy. Medication was last renewed on 11/4/19.    Ariana Masters, [facility] [place] Connection Triage/Med Refill 1/24/2020     Requested Prescriptions   Pending Prescriptions Disp Refills     hydroCHLOROthiazide (HYDRODIURIL) 50 MG tablet [Pharmacy Med Name: hydroCHLOROthiazide Oral Tablet 50 MG] 30 tablet 0     Sig: Take 1 tablet (50 mg) by mouth once a day       Diuretics/Combination Diuretics Refill Protocol  Passed - 1/23/2020  4:53 PM        Passed - Visit with PCP or prescribing provider visit in past 12 months     Last office visit with prescriber/PCP: Visit date not found OR same dept: 10/14/2019 Xenia Patel MD OR same specialty: 10/14/2019 Xenia Patel MD  Last physical: Visit date not found Last MTM visit: Visit date not found   Next visit within 3 mo: Visit date not found  Next physical within 3 mo: Visit date not found  Prescriber OR PCP: Christelle Tavarez MD  Last diagnosis associated with med order: 1. HTN (hypertension)  - hydroCHLOROthiazide (HYDRODIURIL) 50 MG tablet [Pharmacy Med Name: hydroCHLOROthiazide Oral Tablet 50 MG]; Take 1 tablet (50 mg) by mouth once a day  Dispense: 30 tablet; Refill: 0    If protocol passes may refill for 12 months if within 3 months of last provider visit (or a total of 15 months).             Passed - Serum Potassium in past 12 months      Lab Results   Component Value Date    Potassium 3.9 10/14/2019             Passed - Serum Sodium in past 12 months      Lab Results   Component Value Date    Sodium 135 (L) 10/14/2019             Passed - Blood pressure on file in past 12 months     BP Readings from Last 1 Encounters:   10/14/19 133/79             Passed - Serum Creatinine in past 12 months      Creatinine   Date Value Ref Range Status   10/14/2019 0.90 0.60 - 1.10 mg/dL Final

## 2021-06-07 NOTE — TELEPHONE ENCOUNTER
Pt notified at 1252 pm, relayed Dr Patel's message. Pt will call back in July to see if we are seeing Pt's again.  ENRIQUE Dos SantosA

## 2021-06-07 NOTE — TELEPHONE ENCOUNTER
Patient Returning Call  Reason for call:  patient  Information relayed to patient:  Patient had AWV set up that was canceled but she was going to have a med check and diabetic follow up that was going to take place and she wants to know if she will still get her refills on her medication, she wants a callback directly from the provider   Patient has additional questions:  Yes  If YES, what are your questions/concerns:  na  Okay to leave a detailed message?: Yes

## 2021-06-07 NOTE — TELEPHONE ENCOUNTER
"I called patient and advised that we are postponing all physicals to July or later due to COVID 19.  She was concerned about her 6 mo diabetes check up also.  \"She needs stuff done, feet checked, urine checked, A1c checked\".  I advised that we are postponing all of that unless she is having issues.  She states that she just likes to keep things up to date.  She was waiting for another phone call and did not want to discuss any longer.     "

## 2021-06-07 NOTE — TELEPHONE ENCOUNTER
Left message to call back for: Md message  Information to relay to patient:  LMTCB, please inform patient of message below from provider.

## 2021-06-08 NOTE — PROGRESS NOTES
ASSESSMENT: Onychauxis, diabetes with neurologic manifestations, foot pain.    PLAN: Toenails were debrided manually and mechanically x10. Return to clinic in nine weeks.         SUBJECTIVE: Toenails are long, thick and painful. Last nail debridement in the clinic was September 27, 2016.     OBJECTIVE:  General: Pleasant 61 y.o. female in no acute distress.  Vascular: DP pulses are palpable. PT pulses are palpable. Pedal hair is diminished. Feet are cool to the touch.  Neuro: Sensation in the feet is altered. Patient describes paresthesias.  Derm:  Toenails are elongated, thickened and dystrophic with discoloration and subungual debris. Skin is thin and shiny but intact.   Musculoskeletal: Pes planus.

## 2021-06-08 NOTE — PROGRESS NOTES
ASSESSMENT/PLAN    1. Impaired glucose tolerance  Review of chart shows A1c never at 6.5 or above which would be consistent with diabetes.  Most appropriate is diagnosis of impaired glucose tolerance.  A1c up from 6.2-6.4% today so we'll continue to monitor every 6 months.  Patient very resistant to doing Accu-Cheks, doing eye appointment, seeing diabetic education.  Follow-up in 6 months for repeat labs including renal and lipid at that time.  - Ambulatory referral to Ophthalmology; Future  - Glycosylated Hemoglobin A1C  - Microalbumin, Random Urine    2. Morbid obesity  Patient admits to poor dietary choices and is committed to getting diet back on track.  She is reluctant to consider any form of regular exercise.    3. Essential hypertension  Blood pressure borderline today.  Patient refusing to make any changes with medications and wants to work on lifestyle factors first.  Would like her back in a month to repeat blood pressure check and continue monthly until I see her in 6 months.    4. Benign neoplasm of colon  Reviewed previous colonoscopy and history of polyp.  Strongly encouraged patient to schedule routine follow-up colonoscopy at this point.  She is reluctant to do this.  - Ambulatory referral for Colonoscopy    5. Nasal vestibulitis  ENT note reviewed from several years ago with patient complains of nasal irritation---patient insisted she wants to go back for recheck.  She does not recall and has not been using prescribed Bactroban.  - Ambulatory referral to ENT    6. Health care maintenance  All health maintenance was reviewed.  Referral for colonoscopy as above, referral for mammography also completed.  Patient reluctant to consider scheduling these and does not want help with scheduling.  Immunizations are up-to-date.  Recommend physical including pelvic exam.  - Mammo Screening Bilateral; Future    The following high BMI interventions were performed this visit: encouragement to exercise and  weight monitoring    Pt states an understanding and agrees to the above plan.          SUBJECTIVE:   Chief Complaint   Patient presents with     Diabetes     Pt not fasting     Rosalba Bernal 61 y.o. female    Current Outpatient Prescriptions   Medication Sig Dispense Refill     aspirin 81 MG EC tablet Take 81 mg by mouth daily.       blood sugar diagnostic (GLUCOSE BLOOD) Strp Use As Directed 2 times a day at 6:00 am and 4:00 pm.       diphenhydrAMINE (BENADRYL) 25 mg capsule Take 50 mg by mouth bedtime as needed for itching.       fluvastatin XL (LESCOL XL) 80 mg 24 hr tablet TAKE 1 TABLET  BY MOUTH ONCE DAILY 30 tablet 6     hydrochlorothiazide (HYDRODIURIL) 50 MG tablet TAKE 1 TABLET (50 MG) BY MOUTH DAILY. 90 tablet 3     ibuprofen (ADVIL) 200 MG tablet Take 200 mg by mouth every 6 (six) hours as needed for pain.       losartan (COZAAR) 50 MG tablet TAKE 1 TABLET (50 MG) BY MOUTH ONCE DAILY 30 tablet 5     naproxen sodium (ALEVE) 220 MG tablet Take 220 mg by mouth 2 (two) times a day with meals.       nystatin (MYCOSTATIN) cream APPLY TO AFFECTED AREAS TWO TO THREE TIMES A DAY AS DIRECTED 30 g 1     omega-3 fatty acids-vitamin E (FISH OIL) 1,000 mg cap Take by mouth.       ONE TOUCH ULTRASOFT LANCETS MISC Use As Directed 2 times a day at 6:00 am and 4:00 pm.       risperiDONE (RISPERDAL) 4 MG tablet Take 4 mg by mouth daily.       No current facility-administered medications for this visit.      Allergies: Review of patient's allergies indicates no known allergies.   No LMP recorded. Patient has had a hysterectomy.    HPI:   Rosalba is a 61-year-old female comes in today for follow-up regarding her diabetes.  She consistently has A1c in the low 6% range and is not on any diabetic meds.  She is to follow ADA diet but is not compliant with this.  She does not do Accu-Cheks and is refusing routine eye exam.  She reports compliance with her current medications and no side effects or concerns.  She is upset that  "her blood pressure was checked today through her clothing and wants it repeated.  She thinks it's only elevated when she is at clinic but does not have any record of checking it outside of clinic.  Rosalba continues to struggle with her weight and is up 20 pounds in the last year.  She is doing no form of regular exercise and admits she is making poor dietary choices.  Review of her health maintenance shows that she is due this year for colonoscopy and had a history of polyp.  She is very reluctant to consider that procedure.  She reports bowel movements are normal and she will have occasional bright red bleeding.  She also is due for mammography and is stating she will not do that.  She is up-to-date on all routine immunizations.  Finally, patient would like referral back to ENT.  Some years ago she saw Dr. Christian for nasal irritation.  He gave her Bactroban for some nasal irritation which she cannot recall being prescribed.  She has not been using it) that she continues to have nasal sores.  This is something she perseverates on frequently.  Patient has previously been diagnosed with paranoid schizophrenia and is following with psychiatry.  She reports compliance with her medications and follow-up with her counselor.    ROS: negative except as per HPI    OBJECTIVE:   The patient appears well, alert, oriented x 3, in no distress.  Visit Vitals     /76 (Patient Site: Left Arm)     Pulse 88     Resp 16     Ht 5' 1.5\" (1.562 m)     Wt (!) 257 lb 2 oz (116.6 kg)     BMI 47.8 kg/m2       25 minutes spent face-to-face with patient reviewing her chart including previous labs, colonoscopy, mammography making plans for follow-up care and management.  A1c in chart 5.6-6.4%.  "
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
## 2021-06-10 NOTE — TELEPHONE ENCOUNTER
----- Message from Xenia Patel MD sent at 8/22/2020 10:44 PM CDT -----  Please let patient know: rest of lab looks good.

## 2021-06-10 NOTE — TELEPHONE ENCOUNTER
Attempted to reach Pt on preferred number and was unable to LM- just rang/ NO voicemail. If Pt returns call please relay Dr Patel's message.  Geraldine Vera, ENRIQUEA

## 2021-06-10 NOTE — PROGRESS NOTES
ASSESSMENT/ PLAN      Rosalba was seen today for diabetes.    Prediabetes  a1c stable. Patient quite happy about this.   -     Glycosylated Hemoglobin A1c  -     Comprehensive Metabolic Panel    Morbid Obesity  Advised diet and exercise.    Schizophrenia, chronic condition (H)  Sees psychiatry    Hypertension  Well controlled. Refilled meds. Labs today.   -     hydroCHLOROthiazide (HYDRODIURIL) 25 MG tablet; Take 1 tablet (25 mg total) by mouth daily.  -     losartan (COZAAR) 50 MG tablet; Take 1 tablet (50 mg total) by mouth daily.    Encounter for hepatitis C screening test for low risk patient  -     Hepatitis C Antibody (Anti-HCV)    Essential hypertension  -     hydroCHLOROthiazide (HYDRODIURIL) 25 MG tablet; Take 1 tablet (25 mg total) by mouth daily.  -     losartan (COZAAR) 50 MG tablet; Take 1 tablet (50 mg total) by mouth daily.    Hypercholesteremia  -     pravastatin (PRAVACHOL) 40 MG tablet; Take 1 tablet (40 mg total) by mouth every evening.          This note was created using Dragon dictation software, spelling errors may occur.     Xenia Patel MD        SUBJECTIVE   Rosalba Bernal is a 64 y.o. old female who presented to clinic today for further evaluation of follow up of chronic medical condition. Wants to make sure her A1c not worsening. She's trying not to gain weight. She tries to walk but admits it's hard for her to exercise so she's been focusing on portion control. She lives by herself. She has schizophrenia and sees psychiatry on regular basis. She has no other questions today.     Review of Systems:  Negative except as noted in HPI      The following portions of the patient's history were reviewed and updated as appropriate: past medical history, past surgical history, family history, allergies, current medications and problem list.    Medical History  Active Ambulatory (Non-Hospital) Problems    Diagnosis     Umbilical hernia without obstruction and without gangrene     Sphenoid  sinusitis     Lateral Epicondylitis     Hyperplastic Polyp Of The Large Intestine     Seasonal allergic rhinitis     Rhinitis     Intertrigo     Prediabetes     Morbid Obesity     Schizophrenia, chronic condition (H)     Hypertension     Residual type schizophrenic disorder, unspecified condition     Past Medical History:   Diagnosis Date     Diabetes mellitus (H)        Surgical History  She  has a past surgical history that includes pr removal of tonsils,<12 y/o; pr total abdom hysterectomy; and Hysterectomy.    Social History  Reviewed, and she  reports that she has quit smoking. She smoked 0.00 packs per day. She has never used smokeless tobacco. She reports current alcohol use. She reports previous drug use.    Family History  Reviewed, and family history includes Asthma in her brother; Breast cancer in her cousin; Cancer in her mother; Dementia in her father; Diabetes in her father; Hypertension in her father.    Medications  Reviewed and reconciled    Allergies  Allergies   Allergen Reactions     Cat Dander Other (See Comments)     Sneezing, Nasal Congestion         OBJECTIVE  Physical Exam:  Vital signs: /70 (Patient Site: Left Arm, Patient Position: Sitting, Cuff Size: Adult Regular)   Pulse 79   Temp 98.2  F (36.8  C) (Oral)   Resp 16   Wt (!) 242 lb (109.8 kg)   BMI 44.99 kg/m    Weight: (!) 242 lb (109.8 kg)    General appearance: pleasant, appears stated age, cooperative and in no distress, obese.   Eyes: EOMs intact, PERRL, conjunctivae normal.   Respiratory: clear to auscultation bilaterally, good air movement throughout, no wheezing or crackles, speaking full sentences without difficulty  Cardiovascular: regular rate and rhythm, no murmur appreciated, no leg edema  Abdomen: active bowel sounds, soft, non-tender, non-distended  Musculoskeletal: FEET:  MF TESTING: NL              SKIN EXAM:  NL              VASCULAR:   R DP  PULSE: +                                      L DP  PULSE: +                                       R PT  PULSE: +                                      L PT  PULSE: +    Skin: no rashes  Neuro: alert oriented x 3, grossly normal otherwise  Psych: normal affect, appropriate conversation

## 2021-06-11 NOTE — PROGRESS NOTES
ASSESSMENT: Onychauxis, diabetes with neurologic manifestations, foot pain.    PLAN: Toenails were debrided manually and mechanically x10. Return to clinic in nine weeks.         SUBJECTIVE: Toenails are long, thick and painful. Last nail debridement in the clinic was February 14, 2017.     OBJECTIVE:  General: Pleasant 61 y.o. female in no acute distress.  Vascular: DP pulses are palpable. PT pulses are palpable. Pedal hair is diminished. Feet are cool to the touch.  Neuro: Sensation in the feet is altered. Patient describes paresthesias.  Derm:  Toenails are elongated, thickened and dystrophic with discoloration and subungual debris. Skin is thin and shiny but intact.   Musculoskeletal: Pes planus.

## 2021-06-11 NOTE — PROGRESS NOTES
HISTORY OF PRESENT ILLNESS  Patient comes in today for evaluation of her nose.  She has noticed redness of the skin in the forehead and cheeks.  She was seen by me in 2011 for nasal vestibulitis which I treated with mupirocin.  She is convinced that the redness on the face is related to her nose and sinuses and it began with the diagnosis of vestibulitis.  She reports that she also has a cough and a runny nose.  She essentially is demanding that something be done for her nasal and sinus problems.      REVIEW OF SYSTEMS  Review of Systems: a 10-system review was performed. Pertinent positives are noted in the HPI and on a separate scanned document in the chart.    PMH, PSH, FH and SH has documented in the EHR.      EXAM    CONSTITUTIONAL  General Appearance:  Normal, well developed, well nourished, no obvious distress  Ability to Communicate:  communicates appropriately.    HEAD AND FACE  Appearance and Symmetry:  Normal, no scalp or facial scarring or suspicious lesions.  Paranasal sinuses tenderness:  Normal, Paranasal sinuses non tender    EARS  Clinical speech reception threshold:  Normal, able to hear normal speech.  Auricle:  Normal, Auricles without scars, lesions, masses.  External auditory canal:  Normal, External auditory canal normal.  Tympanic membrane:  Normal, Tympanic membranes normal without swelling or erythema.  Tympanic membrane mobility:  Normal, Normal tympanic membrane mobility.    NASAL ENDOSCOPY  The risks and benefits were reviewed with the patient.  The nose was sprayed with lidocaine and phenylephrine.  The following areas were examined:  floor, roof, middle and inferior turbinates, middle and inferior meatus, sphenoethmoidal recess, nasopharynx and septum.  The nasal scope passed without difficulty with the findings noted in the exam.    NOSE (speculum or scope)  Architecture:  Normal, Grossly normal external nasal architecture with no masses or lesions.  Mucosa:  Normal mucosa, No polyps  or masses.  Septum:  Normal, Septum non-obstructing.  Turbinates:  Normal, No turbinate abnormalities    ORAL CAVITY AND OROPHARYNX  Lips:  Normal.  Dental and gingiva:  Normal, No obvious dental or gingival disease.  Mucosa:  Normal, Moist mucous membranes.  Tongue:  Normal, Tongue mobile with no mucosal abnormalities  Hard and soft palate:  Normal, Hard and soft palate without cleft or mucosal lesions.  Oral pharynx:  Normal, Posterior pharynx without lesions or remarkable asymmetry.  Saliva:  Normal, Clear saliva.  Masses:  Normal, No palpable masses or pathologically enlarged lymph nodes.    NECK  Masses/lymph nodes:  Normal, No worrisome neck masses or lymph nodes.  Salivary glands:  Normal, Parotid and submandibular glands.  Trachea and larynx position:  Normal, Trachea and larynx midline.  Thyroid:  Normal, No thyroid abnormality.  Tenderness:  Normal, No cervical tenderness.  Suppleness:  Normal, Neck supple    NEUROLOGICAL  Speech pattern:  Normal, Proasaic    RESPIRATORY  Symmetry and Respiratory effort:  Normal, Symmetric chest movement and expansion with no increased intercostal retractions or use of accessory muscles.     IMPRESSION  No evidence of nasal or sinus infection today.  I discussed obtaining a CT sinus to assess for the possibility of infection.  I also suggested that she consider an Allergy evaluation and possibly Dermatology as well.      RECOMMENDATION  She declined CT sinus at this time.  I recommended that she make Allergy evaluation at the .  I provided her with a consultation to Allergy.    Reg Christian MD

## 2021-06-12 NOTE — PROGRESS NOTES
ASSESSMENT: Onychauxis, diabetes with neurologic manifestations, foot pain.    PLAN: Toenails were debrided manually and mechanically x10. Return to clinic in nine weeks.         SUBJECTIVE: Toenails are long, thick and painful. Last nail debridement in the clinic was June 20, 2017.     OBJECTIVE:  General: Pleasant 61 y.o. female in no acute distress.  Vascular: DP pulses are palpable. PT pulses are palpable. Pedal hair is diminished. Feet are cool to the touch.  Neuro: Sensation in the feet is altered. Patient describes paresthesias.  Derm:  Toenails are elongated, thickened and dystrophic with discoloration and subungual debris. Skin is thin and shiny but intact.   Musculoskeletal: Pes planus.

## 2021-06-12 NOTE — PROGRESS NOTES
Please call patient: I have reviewed blood pressure reading. BP better. Continue with current meds.

## 2021-06-12 NOTE — PROGRESS NOTES
FEMALE ADULT PREVENTIVE EXAM      ASSESSMENT & PLAN  Rosalba was seen today for annual exam.    Diagnoses and all orders for this visit:    Encounter for preventative adult health care examination  Immunizations reviewed --- declined influenza and tetanus update.  She will come back later   Colonosocopy reviewed--normal 2007, doesn't want repeat right now but will consider next year.   Mammography reviewed--normal 2018, doesn't want mammogram every year but every 5 years instead and she won't change her mind.   Pap reviewed -- s/p hysterectomy no need to repeat based on age and risk factors.   DEXA - declined bone density.   Routine Dental and Eye care recommended. She doesn't want to see another eye doctor at this point. Last exam 3/2019 and she doesn't want to do the testing. She will go back when her vision changes.   Discussed importance of regular exercise and appropriate calcium intake  Discussed Advance Directives--patient given copy of honoring choices. She doesn't want to fill out the form again because she believes she's done it with her previous PCP and she tells me today she doesn't want life sustaining treatment.     Morbid Obesity  Continue with diet/exercise.    Schizophrenia, chronic condition (H)  Stable. On risperdone    Hypertension  -     losartan (COZAAR) 50 MG tablet; Take 1.5 tablets (75 mg total) by mouth daily.    Prediabetes  Continue with diabetes diet and walking. No lab needed today as has been done recently.     Essential hypertension  Elevated. Increase losartan to 75 mg daily. rtc in 2 weeks for nurse only BP. I previously decreased hydrochlorothiazide from 50 mg to 25 mg daily. Asymptomatic.   -     losartan (COZAAR) 50 MG tablet; Take 1.5 tablets (75 mg total) by mouth daily.      This note was created using Dragon dictation software, spelling errors may occur.    Xenia Patel MD      CHIEF COMPLAINT:  Female preventive exam.    SUBJECTIVE:  Rosalba Bernal is a 64 y.o. female  who is here for annual physical. Updated with fasting lab recently. BP is high today. She's feeling good overall. She walks every day. And trying to do diabetes diet. Sleeping well. Mood is good.    She last saw me 8/24/2020.    She  has a past medical history of Diabetes mellitus (H).    Lab Results   Component Value Date    WBC 7.8 10/14/2019    HGB 14.5 10/14/2019    HCT 43.2 10/14/2019    MCV 88 10/14/2019     10/14/2019     08/21/2020    K 3.8 08/21/2020    BUN 13 08/21/2020     Lab Results   Component Value Date    CHOL 151 10/14/2019    HDL 46 (L) 10/14/2019    LDLCALC 80 10/14/2019    TRIG 125 10/14/2019     No results found for: TSH  BP Readings from Last 3 Encounters:   10/16/20 (!) 181/72   08/21/20 131/70   10/14/19 133/79       Surgeries:    Past Surgical History:   Procedure Laterality Date     HYSTERECTOMY      when she was 27 years old.      MN REMOVAL OF TONSILS,<14 Y/O      Description: Tonsillectomy;  Recorded: 11/19/2007;     MN TOTAL ABDOM HYSTERECTOMY      Description: Total Abdominal Hysterectomy;  Recorded: 11/19/2007;  Comments: no oophorectomy       Family History:  Her family history includes Asthma in her brother; Breast cancer in her cousin; Cancer in her mother; Dementia in her father; Diabetes in her father; Hypertension in her father.    Social History:  She  reports that she has quit smoking. She smoked 0.00 packs per day. She has never used smokeless tobacco. She reports current alcohol use. She reports previous drug use.    Medications:    Current Outpatient Medications:      aspirin 81 MG EC tablet, Take 81 mg by mouth daily., Disp: , Rfl:      diphenhydrAMINE (BENADRYL) 25 mg capsule, Take 25 mg by mouth at bedtime.    , Disp: , Rfl:      fexofenadine (ALLEGRA) 60 MG tablet, Take 60 mg by mouth daily., Disp: , Rfl:      hydroCHLOROthiazide (HYDRODIURIL) 25 MG tablet, Take 1 tablet (25 mg total) by mouth daily., Disp: 90 tablet, Rfl: 1     ibuprofen (ADVIL) 200 MG  "tablet, Take 200 mg by mouth every 6 (six) hours as needed for pain., Disp: , Rfl:      losartan (COZAAR) 50 MG tablet, Take 1 tablet (50 mg total) by mouth daily., Disp: 90 tablet, Rfl: 1     naproxen sodium (ALEVE) 220 MG tablet, Take 220 mg by mouth 2 (two) times a day with meals., Disp: , Rfl:      omega-3 fatty acids-vitamin E (FISH OIL) 1,000 mg cap, Take 2 capsules by mouth.    , Disp: , Rfl:      pravastatin (PRAVACHOL) 40 MG tablet, Take 1 tablet (40 mg total) by mouth every evening., Disp: 90 tablet, Rfl: 1     risperiDONE (RISPERDAL) 4 MG tablet, Take 4 mg by mouth daily., Disp: , Rfl:      hydrocortisone (WESTCORT) 0.2 % cream, Thin layer of cream twice daily to R 2nd finger for 2 wks, Disp: 45 g, Rfl: 0  HELD MEDICATIONS: None.    Allergies:  No latex allergies.  Allergies   Allergen Reactions     Cat Dander Other (See Comments)     Sneezing, Nasal Congestion       REVIEW OF SYSTEMS:  Complete head to toe review of systems is otherwise negative except as above.    OBJECTIVE:  BP (!) 181/72   Pulse 96   Temp 96.8  F (36  C) (Oral)   Ht 5' 1.5\" (1.562 m)   Wt (!) 243 lb (110.2 kg)   BMI 45.17 kg/m    Physical Exam:  General Appearance: Alert, cooperative, no distress, appears stated age, morbidly obese   Head: Normocephalic, without obvious abnormality, atraumatic  Eyes: PERRL, conjunctiva/corneas clear, EOM's intact  Neck: Supple, symmetrical, trachea midline, no adenopathy;  thyroid: not enlarged, symmetric, no tenderness/mass/nodules.  Back: Symmetric, no curvature, ROM normal, no CVA tenderness  Lungs: Clear to auscultation bilaterally, respirations unlabored  Breasts: Not examined.   Heart: Regular rate and rhythm, S1 and S2 normal, no murmur, rub, or gallop,   bdomen: Soft, non-tender, bowel sounds active all four quadrants,  no masses, no organomegaly  Pelvic:Not examined  Extremities: Extremities normal, atraumatic, no cyanosis or edema  Skin: Skin color, texture, turgor normal, no rashes or " lesions  Lymph nodes: Cervical, supraclavicular, and axillary nodes normal  Neurologic: Normal

## 2021-06-12 NOTE — TELEPHONE ENCOUNTER
Called and spoke to patient. Patient would like to have another bp check to see if bp goes down even more. Patient is scheduled for 11/13.    Please call patient: I have reviewed blood pressure reading. BP better. Continue with current meds.

## 2021-06-12 NOTE — TELEPHONE ENCOUNTER
----- Message from Xenia Patel MD sent at 10/30/2020 11:15 AM CDT -----      ----- Message -----  From: Hortencia Lynn MA  Sent: 10/29/2020  10:22 AM CDT  To: Xenia Patel MD

## 2021-06-12 NOTE — PROGRESS NOTES
Follow Up Blood Pressure Check    Rosalba Bernal is a 64 y.o. female recommended to follow up for blood pressure check by Xenia Patel MD. Anihypertensive medications and adherence were verified: Yes.     Reason for visit: Follow up from last OV    Essential hypertension  Elevated. Increase losartan to 75 mg daily. rtc in 2 weeks for nurse only BP. I previously decreased hydrochlorothiazide from 50 mg to 25 mg daily. Asymptomatic.     Medication change at last visit: Yes- increased losartan     Today's Vitals:   Vitals:    10/29/20 1018 10/29/20 1021   BP: 150/57 134/69   Pulse: (!) 121 (!) 102       Home blood pressure readings brought in today:   NA    Lowest blood pressure today is less than 140/90 and they deny signs or symptoms of new onset: .  Please inform patient of his/her blood pressure today.  If they are asymptomatic, the patient is to continue current medications.  This message will be routed to their provider, and they will be notified if a change in medication is recommended.    ( may be deleted if not applicable) If lowest blood pressure is greater than 200/110, regardless if symptoms are present, patient needs to be evaluated by a provider today.    Hortencia Lynn    Current Outpatient Medications   Medication Sig Dispense Refill     aspirin 81 MG EC tablet Take 81 mg by mouth daily.       diphenhydrAMINE (BENADRYL) 25 mg capsule Take 25 mg by mouth at bedtime.              fexofenadine (ALLEGRA) 60 MG tablet Take 60 mg by mouth daily.       hydroCHLOROthiazide (HYDRODIURIL) 25 MG tablet Take 1 tablet (25 mg total) by mouth daily. 90 tablet 1     ibuprofen (ADVIL) 200 MG tablet Take 200 mg by mouth every 6 (six) hours as needed for pain.       losartan (COZAAR) 50 MG tablet Take 1.5 tablets (75 mg total) by mouth daily. 90 tablet 1     naproxen sodium (ALEVE) 220 MG tablet Take 220 mg by mouth 2 (two) times a day with meals.       omega-3 fatty acids-vitamin E (FISH OIL) 1,000 mg cap Take 2  capsules by mouth.              pravastatin (PRAVACHOL) 40 MG tablet Take 1 tablet (40 mg total) by mouth every evening. 90 tablet 1     risperiDONE (RISPERDAL) 4 MG tablet Take 4 mg by mouth daily.       No current facility-administered medications for this visit.

## 2021-06-13 NOTE — TELEPHONE ENCOUNTER
Called and spoke to patient. She states her bp was better the second time they took it at clinic. Bp is 129/65. Does provider still want pt to increase bp med?    Please call patient: I have reviewed blood pressure reading. BP is still high above goal of 140/80. We should increase her losartan to 100 mg (right now she's on 75 mg daily). Let me know if she's ok with that I will send new script.

## 2021-06-13 NOTE — TELEPHONE ENCOUNTER
----- Message from Xenia Patel MD sent at 11/17/2020  9:41 PM CST -----      ----- Message -----  From: Hortencia Lynn MA  Sent: 11/13/2020   9:47 AM CST  To: Xenia Patel MD

## 2021-06-13 NOTE — PROGRESS NOTES
Follow Up Blood Pressure Check    Rosalba Bernal is a 65 y.o. female recommended to follow up for blood pressure check by Xenia Patel MD. Anihypertensive medications and adherence were verified: Yes.     Reason for visit: Pt stopped in for BP check    Medication change at last visit: No    Today's Vitals:   Vitals:    11/13/20 0943   BP: 158/68   Pulse: 88       Home blood pressure readings brought in today:   NA    Lowest blood pressure today is less than 140/90 and they deny signs or symptoms of new onset: .  Please inform patient of his/her blood pressure today.  If they are asymptomatic, the patient is to continue current medications.  This message will be routed to their provider, and they will be notified if a change in medication is recommended.    ( may be deleted if not applicable) If lowest blood pressure is greater than 200/110, regardless if symptoms are present, patient needs to be evaluated by a provider today.    Hortencia Lynn    Current Outpatient Medications   Medication Sig Dispense Refill     aspirin 81 MG EC tablet Take 81 mg by mouth daily.       diphenhydrAMINE (BENADRYL) 25 mg capsule Take 25 mg by mouth at bedtime.              fexofenadine (ALLEGRA) 60 MG tablet Take 60 mg by mouth daily.       hydroCHLOROthiazide (HYDRODIURIL) 25 MG tablet Take 1 tablet (25 mg total) by mouth daily. 90 tablet 1     ibuprofen (ADVIL) 200 MG tablet Take 200 mg by mouth every 6 (six) hours as needed for pain.       losartan (COZAAR) 50 MG tablet Take 1.5 tablets (75 mg total) by mouth daily. 90 tablet 1     naproxen sodium (ALEVE) 220 MG tablet Take 220 mg by mouth 2 (two) times a day with meals.       omega-3 fatty acids-vitamin E (FISH OIL) 1,000 mg cap Take 2 capsules by mouth.              pravastatin (PRAVACHOL) 40 MG tablet Take 1 tablet (40 mg total) by mouth every evening. 90 tablet 1     risperiDONE (RISPERDAL) 4 MG tablet Take 4 mg by mouth daily.       No current facility-administered  medications for this visit.

## 2021-06-13 NOTE — PROGRESS NOTES
Please call patient: I have reviewed blood pressure reading. BP is still high above goal of 140/80. We should increase her losartan to 100 mg (right now she's on 75 mg daily). Let me know if she's ok with that I will send new script.

## 2021-06-13 NOTE — TELEPHONE ENCOUNTER
Please let patient know: I am so sorry I didn't see the second value recorded. I have now seen it and yet it is lower. Excellent pressure reading. No need to increase blood pressure meds. Happy thanksgiving!

## 2021-06-13 NOTE — PROGRESS NOTES
ASSESSMENT/PLAN  1. HTN (hypertension)  Stable w/ current medication---refill and check routine labs; pt will be advised of results when available  - hydroCHLOROthiazide (HYDRODIURIL) 50 MG tablet; Take 1 tablet (50 mg total) by mouth daily.  Dispense: 90 tablet; Refill: 3  - losartan (COZAAR) 50 MG tablet; Take 1 tablet (50 mg total) by mouth daily.  Dispense: 90 tablet; Refill: 3  - Basic Metabolic Panel    2. Hypercholesteremia  Stable w/ current medication---refill and check routine labs; pt will be advised of results when available  - fluvastatin (LESCOL XL) 80 mg 24 hr tablet; Take 1 tablet (80 mg total) by mouth daily.  Dispense: 90 tablet; Refill: 3  - Lipid Cascade    3. Chronic nasal congestion  Patient demanding antibiotics today and clearly unsatisfied that these would not be prescribed.  We reviewed notes from ENT in next steps was to do CT sinuses for further evaluation.  She reluctantly agrees to that and our specialty  will help her get that scheduled---she left before  had this scheduled.  - CT Sinuses Without Contrast; Future    4. Prediabetes  Glucose as part of current labs.  Recommend follow-up again in 6 months.  Consider recheck of A1c at that time.    5. Morbid obesity  Congratulated patient on her efforts at weight loss.  Strongly encourage her to keep working on diet and exercise.    6. Eczema, unspecified type--finger  Discussed importance of skin care including adequate hydration of her skin as well as avoidance of repeated wet-to-dry cycle which will aggravate the eczema.  Prescription given for topical steroid cream, Westcort, and instructed in use--twice daily for up to 2 weeks.  Follow-up if worse or not improving.    7. Paranoid schizophrenia  Patient on Risperdal from psychiatry--- overall questionable control of this makes management of other medical issues difficult.      The following high BMI interventions were performed this visit: encouragement to exercise  and weight monitoring    Pt states an understanding and agrees to the above plan.              SUBJECTIVE:   Chief Complaint   Patient presents with     Skin Problem     Cracking skin on right index finger     Cough     c/o chronic cough, wheezing, shortness of breath, requesting antibiotic - was seen by Dr Christian     Diabetes     Declines to do A1c lab today - coming back in January for diabetic check      Skin Problem     Check skin tags      Rosalba Bernal 61 y.o. female    Current Outpatient Prescriptions   Medication Sig Dispense Refill     aspirin 81 MG EC tablet Take 81 mg by mouth daily.       blood glucose test (GLUCOSE BLOOD) strips Use 1 each As Directed 2 (two) times a day. One Touch Ultra Test Strips 200 each 1     diphenhydrAMINE (BENADRYL) 25 mg capsule Take 50 mg by mouth bedtime as needed for itching.       fexofenadine (ALLEGRA) 60 MG tablet Take 60 mg by mouth daily.       fluvastatin (LESCOL XL) 80 mg 24 hr tablet Take 1 tablet (80 mg total) by mouth daily. 90 tablet 3     hydroCHLOROthiazide (HYDRODIURIL) 50 MG tablet Take 1 tablet (50 mg total) by mouth daily. 90 tablet 3     ibuprofen (ADVIL) 200 MG tablet Take 200 mg by mouth every 6 (six) hours as needed for pain.       losartan (COZAAR) 50 MG tablet Take 1 tablet (50 mg total) by mouth daily. 90 tablet 3     naproxen sodium (ALEVE) 220 MG tablet Take 220 mg by mouth 2 (two) times a day with meals.       nystatin (MYCOSTATIN) cream APPLY TO AFFECTED AREAS TWO TO THREE TIMES A DAY AS DIRECTED 30 g 1     omega-3 fatty acids-vitamin E (FISH OIL) 1,000 mg cap Take by mouth.       ONE TOUCH ULTRASOFT LANCETS MISC Use As Directed 2 times a day at 6:00 am and 4:00 pm.       risperiDONE (RISPERDAL) 4 MG tablet Take 4 mg by mouth daily.       hydrocortisone (WESTCORT) 0.2 % cream Thin layer of cream twice daily to R 2nd finger for 2 wks 45 g 0     No current facility-administered medications for this visit.      Allergies: Review of patient's  "allergies indicates no known allergies.   No LMP recorded. Patient has had a hysterectomy.    HPI:   Rosalba is a 7-year-old female comes in today for follow-up regarding prediabetes, hypertension, elevated cholesterol.  She is fasting for labs.  She does need refill of current medications.  She does report compliance with her current medications and no side effects or concerns.  She reports she is trying to work on her diet and is happy she is down a few pounds.    Rosalba denies chest pain, shortness of breath, lower extremity edema, headaches, or vision changes.  Patient is perseverating on some issues with her nose.  She reports that this is been going on for\" years\".  Today, she is demanding antibiotics and is quite upset that we will not give them to her.  She she believes is persistent sores in her nose which she has to pick at each day.  She states she gets large crusts out of her nose and believes this would be better with antibiotics.  She seen Dr. Christian twice.  Most recently, she saw in April.  He did not feel that there was any concern but would suggest a CT of her sinuses to further evaluate.  Patient complains that the sores/congestion or impacting her breathing.  Previously, we have tried to get her to do a nasal steroid spray but she has been noncompliant.  She is doing no topical treatment at this time.  Dr. Christian is previously given her mupirocin to put in her nose and she used this briefly.  She did not feel it helped.    Patient, in fact, is deferring doing colonoscopy because she\" wants to get this nose thing cleared up\" before she will do it.  She is concerned with anesthesia she will stop breathing.  Patient has history of colon polyp and is overdue for follow-up.  She denies change in bowel function.  Rosalba also has multiple skin tags that she is concerned about wants removed.  She brings these up frequently.  She talks about a very large one on her lower abdomen that she thinks is going " to get ripped off by her clothing.  She has had some skin tags removed in the past.  She refuses any discussion around the fact her weight plays a factor in the skin tag development that they will continue to form.    ROS: negative except as per HPI    OBJECTIVE:   The patient appears well, alert, oriented x 3, in no distress.  She does become loud and confrontational in the exam room but will calm down and apologizes for her behavior.  /64 (Patient Site: Right Arm, Patient Position: Sitting, Cuff Size: Adult Large)  Pulse 84  Temp 98.2  F (36.8  C) (Oral)   Resp 20  Wt (!) 250 lb (113.4 kg)  BMI 46.47 kg/m2    HEENT:  Nose/mouth moist, no erythema/lesions. Neck supple. No adenopathy or thyromegaly.   Lungs: clear, good air entry, no wheezes, rhonchi or rales.   Cardiac: S1 and S2 normal, no murmurs, regular rate and rhythm.   Extremities: show no edema, normal peripheral pulses.   Skin: Right second finger shows some mild swelling without erythema but dryness and cracking at the extensor creases.  No other skin impacted with similar changes.    She has several small skin tags that her neck one on her lower abdomen has a wide-based but is still quite small----we discussed removal would be considered cosmetic.

## 2021-06-14 NOTE — PROGRESS NOTES
ASSESSMENT: Onychauxis, diabetes with neurologic manifestations, foot pain.    PLAN: Toenails were debrided manually and mechanically x10. Return to clinic in nine weeks.         SUBJECTIVE: Toenails are long, thick and painful. Last nail debridement in the clinic was August 29, 2017.     OBJECTIVE:  General: Pleasant 62 y.o. female in no acute distress.  Vascular: DP pulses are palpable. PT pulses are palpable. Pedal hair is diminished. Feet are cool to the touch.  Neuro: Sensation in the feet is altered. Patient describes paresthesias.  Derm:  Toenails are elongated, thickened and dystrophic with discoloration and subungual debris. Skin is thin and shiny but intact.   Musculoskeletal: Pes planus.

## 2021-06-15 NOTE — PROGRESS NOTES
ASSESSMENT: Onychauxis, diabetes with neurologic manifestations, foot pain.    PLAN: Toenails were debrided manually and mechanically x10. Return to clinic in nine weeks.         SUBJECTIVE: Toenails are long, thick and painful. Last nail debridement in the clinic was November 28, 2017.     OBJECTIVE:  General: Pleasant 62 y.o. female in no acute distress.  Vascular: DP pulses are palpable. PT pulses are palpable. Pedal hair is diminished. Feet are cool to the touch.  Neuro: Sensation in the feet is altered. Patient describes paresthesias.  Derm:  Toenails are elongated, thickened and dystrophic with discoloration and subungual debris. Skin is thin and shiny but intact.   Musculoskeletal: Pes planus.

## 2021-06-15 NOTE — TELEPHONE ENCOUNTER
Refills Approved x 3    Rx renewed per Medication Renewal Policy. Medication was last renewed on 08/21/2020-hydrochlorothiazide, pravastatin, 10/16/2020-losartan.   Last office visit was 10/16/2020 with PCP.    Rose Rodriguez, [facility] Triage/Med Refill 2/11/2021     Requested Prescriptions   Pending Prescriptions Disp Refills     pravastatin (PRAVACHOL) 40 MG tablet [Pharmacy Med Name: Pravastatin Sodium Oral Tablet 40 MG] 90 tablet 0     Sig: Take 1 tablet (40 mg) by mouth every evening.       Statins Refill Protocol (Hmg CoA Reductase Inhibitors) Passed - 2/11/2021  9:10 AM        Passed - PCP or prescribing provider visit in past 12 months      Last office visit with prescriber/PCP: 8/21/2020 Xenia Patel MD OR same dept: 8/21/2020 Xenia Patel MD OR same specialty: 8/21/2020 Xenia Patel MD  Last physical: 10/16/2020 Last MTM visit: Visit date not found   Next visit within 3 mo: Visit date not found  Next physical within 3 mo: Visit date not found  Prescriber OR PCP: Xenia Patel MD  Last diagnosis associated with med order: 1. Hypercholesteremia  - pravastatin (PRAVACHOL) 40 MG tablet [Pharmacy Med Name: Pravastatin Sodium Oral Tablet 40 MG]; Take 1 tablet (40 mg) by mouth every evening.  Dispense: 90 tablet; Refill: 0    2. Hypertension  - hydroCHLOROthiazide (HYDRODIURIL) 25 MG tablet [Pharmacy Med Name: hydroCHLOROthiazide Oral Tablet 25 MG]; Take 1 tablet (25 mg) by mouth ONCE daily.  Dispense: 90 tablet; Refill: 0  - losartan (COZAAR) 50 MG tablet [Pharmacy Med Name: Losartan Potassium Oral Tablet 50 MG]; Take 1.5 tablets (75 mg total) by mouth daily.  Dispense: 90 tablet; Refill: 0    3. Essential hypertension  - hydroCHLOROthiazide (HYDRODIURIL) 25 MG tablet [Pharmacy Med Name: hydroCHLOROthiazide Oral Tablet 25 MG]; Take 1 tablet (25 mg) by mouth ONCE daily.  Dispense: 90 tablet; Refill: 0  - losartan (COZAAR) 50 MG tablet [Pharmacy Med Name: Losartan Potassium Oral Tablet 50  MG]; Take 1.5 tablets (75 mg total) by mouth daily.  Dispense: 90 tablet; Refill: 0    If protocol passes may refill for 12 months if within 3 months of last provider visit (or a total of 15 months).                hydroCHLOROthiazide (HYDRODIURIL) 25 MG tablet [Pharmacy Med Name: hydroCHLOROthiazide Oral Tablet 25 MG] 90 tablet 0     Sig: Take 1 tablet (25 mg) by mouth ONCE daily.       Diuretics/Combination Diuretics Refill Protocol  Passed - 2/11/2021  9:10 AM        Passed - Visit with PCP or prescribing provider visit in past 12 months     Last office visit with prescriber/PCP: 8/21/2020 Xenia Patel MD OR same dept: 8/21/2020 Xenia Patel MD OR same specialty: 8/21/2020 Xenia Patel MD  Last physical: 10/16/2020 Last MTM visit: Visit date not found   Next visit within 3 mo: Visit date not found  Next physical within 3 mo: Visit date not found  Prescriber OR PCP: Xenia Patel MD  Last diagnosis associated with med order: 1. Hypercholesteremia  - pravastatin (PRAVACHOL) 40 MG tablet [Pharmacy Med Name: Pravastatin Sodium Oral Tablet 40 MG]; Take 1 tablet (40 mg) by mouth every evening.  Dispense: 90 tablet; Refill: 0    2. Hypertension  - hydroCHLOROthiazide (HYDRODIURIL) 25 MG tablet [Pharmacy Med Name: hydroCHLOROthiazide Oral Tablet 25 MG]; Take 1 tablet (25 mg) by mouth ONCE daily.  Dispense: 90 tablet; Refill: 0  - losartan (COZAAR) 50 MG tablet [Pharmacy Med Name: Losartan Potassium Oral Tablet 50 MG]; Take 1.5 tablets (75 mg total) by mouth daily.  Dispense: 90 tablet; Refill: 0    3. Essential hypertension  - hydroCHLOROthiazide (HYDRODIURIL) 25 MG tablet [Pharmacy Med Name: hydroCHLOROthiazide Oral Tablet 25 MG]; Take 1 tablet (25 mg) by mouth ONCE daily.  Dispense: 90 tablet; Refill: 0  - losartan (COZAAR) 50 MG tablet [Pharmacy Med Name: Losartan Potassium Oral Tablet 50 MG]; Take 1.5 tablets (75 mg total) by mouth daily.  Dispense: 90 tablet; Refill: 0    If protocol passes may  refill for 12 months if within 3 months of last provider visit (or a total of 15 months).             Passed - Serum Potassium in past 12 months      Lab Results   Component Value Date    Potassium 3.8 08/21/2020             Passed - Serum Sodium in past 12 months      Lab Results   Component Value Date    Sodium 138 08/21/2020             Passed - Blood pressure on file in past 12 months     BP Readings from Last 1 Encounters:   11/13/20 129/65             Passed - Serum Creatinine in past 12 months      Creatinine   Date Value Ref Range Status   08/21/2020 0.80 0.60 - 1.10 mg/dL Final                losartan (COZAAR) 50 MG tablet [Pharmacy Med Name: Losartan Potassium Oral Tablet 50 MG] 90 tablet 0     Sig: TAKE 1.5 TABLETS (75 MG TOTAL) BY MOUTH DAILY       Angiotensin Receptor Blocker Protocol Passed - 2/11/2021  9:10 AM        Passed - PCP or prescribing provider visit in past 12 months       Last office visit with prescriber/PCP: 8/21/2020 Xenia Patel MD OR same dept: 8/21/2020 Xenia Patel MD OR same specialty: 8/21/2020 Xenia Patel MD  Last physical: 10/16/2020 Last MTM visit: Visit date not found   Next visit within 3 mo: Visit date not found  Next physical within 3 mo: Visit date not found  Prescriber OR PCP: Xenia Patel MD  Last diagnosis associated with med order: 1. Hypercholesteremia  - pravastatin (PRAVACHOL) 40 MG tablet [Pharmacy Med Name: Pravastatin Sodium Oral Tablet 40 MG]; Take 1 tablet (40 mg) by mouth every evening.  Dispense: 90 tablet; Refill: 0    2. Hypertension  - hydroCHLOROthiazide (HYDRODIURIL) 25 MG tablet [Pharmacy Med Name: hydroCHLOROthiazide Oral Tablet 25 MG]; Take 1 tablet (25 mg) by mouth ONCE daily.  Dispense: 90 tablet; Refill: 0  - losartan (COZAAR) 50 MG tablet [Pharmacy Med Name: Losartan Potassium Oral Tablet 50 MG]; Take 1.5 tablets (75 mg total) by mouth daily.  Dispense: 90 tablet; Refill: 0    3. Essential hypertension  - hydroCHLOROthiazide  (HYDRODIURIL) 25 MG tablet [Pharmacy Med Name: hydroCHLOROthiazide Oral Tablet 25 MG]; Take 1 tablet (25 mg) by mouth ONCE daily.  Dispense: 90 tablet; Refill: 0  - losartan (COZAAR) 50 MG tablet [Pharmacy Med Name: Losartan Potassium Oral Tablet 50 MG]; Take 1.5 tablets (75 mg total) by mouth daily.  Dispense: 90 tablet; Refill: 0    If protocol passes may refill for 12 months if within 3 months of last provider visit (or a total of 15 months).             Passed - Serum potassium within the past 12 months     Lab Results   Component Value Date    Potassium 3.8 08/21/2020             Passed - Blood pressure filed in past 12 months     BP Readings from Last 1 Encounters:   11/13/20 129/65             Passed - Serum creatinine within the past 12 months     Creatinine   Date Value Ref Range Status   08/21/2020 0.80 0.60 - 1.10 mg/dL Final

## 2021-06-16 PROBLEM — E11.42 TYPE 2 DIABETES MELLITUS WITH DIABETIC POLYNEUROPATHY, WITHOUT LONG-TERM CURRENT USE OF INSULIN (H): Status: ACTIVE | Noted: 2021-04-05

## 2021-06-16 PROBLEM — K42.9 UMBILICAL HERNIA WITHOUT OBSTRUCTION AND WITHOUT GANGRENE: Status: ACTIVE | Noted: 2019-10-14

## 2021-06-16 PROBLEM — J32.3 SPHENOID SINUSITIS: Status: ACTIVE | Noted: 2017-11-29

## 2021-06-16 NOTE — PROGRESS NOTES
"    Assessment & Plan     Rosalba was seen today for nasal congestion.    Diagnoses and all orders for this visit:    Nasal congestion  Chronic nasal discharge  Most likely d/t allergies. Has seen ent before. Takes allegra. Will add on flonase. She believes it's due to covid so I swabbed her as well but I don't think it's due to covid. She's been symptomatic since 2007. No other typical covid infection symptoms. No sinus infection/ear infection or pharyngitis seen today via exam either.   -     Symptomatic COVID-19 Virus (CORONAVIRUS) PCR  -     COVID-19 Tristan RBD Pamella and Reflex Titer; Future  -     SARS-CoV-2 Nucleocapsid Total Ab, Serum (COVTA); Future  -     fluticasone propionate (FLONASE) 50 mcg/actuation nasal spray; 2 sprays into each nostril 2 (two) times a day.  -     COVID-19 Tristan RBD Pamella and Reflex Titer    Schizophrenia, chronic condition (H)  Seeing psych. Stable.    Type 2 diabetes mellitus with diabetic polyneuropathy, without long-term current use of insulin (H)  Eating sweet. Advised diet/exercise.  -     Glycosylated Hemoglobin A1c  -     Comprehensive Metabolic Panel    Morbid obesity (H)  Diet/exercise     Essential hypertension  Not well controlled. Increased losartan to 100 mg daily. rtc in 2 weeks nurse only BP check.  -     losartan (COZAAR) 50 MG tablet; Take 2 tablets (100 mg total) by mouth daily.  -     Comprehensive Metabolic Panel      BMI:   Estimated body mass index is 46.43 kg/m  as calculated from the following:    Height as of 10/16/20: 5' 1.5\" (1.562 m).    Weight as of this encounter: 249 lb 12.8 oz (113.3 kg).   The following high BMI interventions were performed this visit: encouragement to exercise and lifestyle education regarding diet    Return in about 6 months (around 10/5/2021).    Xenia Patel MD  [facility]    Subjective   Rosalba Bernal is 65 y.o. and presents today for the following health issues   HPI   Chief Complaint   Patient " presents with     Nasal Congestion     sores inside her nose, crusting in nose, nasal congestion, x years      Has been going on for a long time. She wants to be tested for covid.  Allegra helps. Has seen ENT before.   Started to walk again.  Likes to eat sweet.     Review of Systems  Negative.       Objective    /64 (Patient Site: Left Arm, Patient Position: Sitting, Cuff Size: Adult Large)   Pulse 89   Temp 98  F (36.7  C) (Oral)   Resp 20   Wt (!) 249 lb 12.8 oz (113.3 kg)   BMI 46.43 kg/m    Body mass index is 46.43 kg/m .  Physical Exam  GENERAL: Healthy, alert and no distress  HENT: normal cephalic/atraumatic, ear canals and TM's normal, nasal mucosa edematous , rhinorrhea clear, oropharynx clear and oral mucous membranes moist  RESP: lungs clear to auscultation - no rales, rhonchi or wheezes  CV: regular rates and rhythm  NEURO: Cranial nerves grossly intact. Mentation and speech appropriate for age.  PSYCH: Mentation appears normal, affect normal/bright, judgement and insight intact, normal speech and appearance well-groomed

## 2021-06-16 NOTE — PROGRESS NOTES
HISTORY OF PRESENT ILLNESS  Patient comes in for recheck of her nose.  She is requesting an oral antibiotics.  She was prescribed Cefuroxime late November of 2017.  She believes that if she had another course it would take care of the crusting that is occurring on the left.  I explained that I didn't give her the antibiotic for crusting but for the sphenoid on the right.  She is not complaining of symptoms on the right.  Her main issue is what sounds like crusting on the left side of the nose.      REVIEW OF SYSTEMS  Review of Systems: a 10-system review was performed. Pertinent positives are noted in the HPI and on a separate scanned document in the chart.    PMH, PSH, FH and SH has documented in the EHR.      EXAM    CONSTITUTIONAL  General Appearance:  Normal, well developed, well nourished, no obvious distress  Ability to Communicate:  communicates appropriately.    HEAD AND FACE  Appearance and Symmetry:  Normal, no scalp or facial scarring or suspicious lesions.  Paranasal sinuses tenderness:  Normal, Paranasal sinuses non tender    EARS  Clinical speech reception threshold:  Normal, able to hear normal speech.  Auricle:  Normal, Auricles without scars, lesions, masses.  External auditory canal:  Normal, External auditory canal normal.  Tympanic membrane:  Normal, Tympanic membranes normal without swelling or erythema.  Tympanic membrane mobility:  Normal, Normal tympanic membrane mobility.    NOSE (speculum or scope)  Architecture:  Normal, Grossly normal external nasal architecture with no masses or lesions.  Mucosa:  Normal mucosa, No polyps or masses.  Septum:  Area of crusting and vestibulitis of the left anterior septum  Turbinates:  Normal, No turbinate abnormalities    ORAL CAVITY AND OROPHARYNX  Lips:  Normal.  Dental and gingiva:  Normal, No obvious dental or gingival disease.  Mucosa:  Normal, Moist mucous membranes.  Tongue:  Normal, Tongue mobile with no mucosal abnormalities  Hard and soft  palate:  Normal, Hard and soft palate without cleft or mucosal lesions.  Oral pharynx:  Normal, Posterior pharynx without lesions or remarkable asymmetry.  Saliva:  Normal, Clear saliva.  Masses:  Normal, No palpable masses or pathologically enlarged lymph nodes.    NECK  Masses/lymph nodes:  Normal, No worrisome neck masses or lymph nodes.  Salivary glands:  Normal, Parotid and submandibular glands.  Trachea and larynx position:  Normal, Trachea and larynx midline.  Thyroid:  Normal, No thyroid abnormality.  Tenderness:  Normal, No cervical tenderness.  Suppleness:  Normal, Neck supple    NEUROLOGICAL  Speech pattern:  Normal, Proasaic    RESPIRATORY  Symmetry and Respiratory effort:  Normal, Symmetric chest movement and expansion with no increased intercostal retractions or use of accessory muscles.     IMPRESSION  Left nasal vestibulitis.      RECOMMENDATION  I provided the patient with a prescription for mupirocin to be used twice a day for two weeks.  Follow up as needed.    Reg Christian MD

## 2021-06-16 NOTE — TELEPHONE ENCOUNTER
----- Message from Xenia Patel MD sent at 4/8/2021 11:20 AM CDT -----  Please call patient: covid testing is negative.

## 2021-06-16 NOTE — TELEPHONE ENCOUNTER
Reason contacted:  Results  Information relayed:  Informed patient of message below. Patient states understanding.  Additional questions:  No  Further follow-up needed:  No  Okay to leave a detailed message:  No     Patient requested a copy of results.

## 2021-06-16 NOTE — TELEPHONE ENCOUNTER
Attempted to call pt a couple times but phone just rings for awhile and then hangs up. No VM available. When pt calls back please let her know that her result was negative and letter was already mailed out to her with the result. Please also let pt know that for future results the quickest way to receive her results is by signing up for [facility]

## 2021-06-16 NOTE — TELEPHONE ENCOUNTER
"Incoming call from patient stating \" I think have covid since 2005. I've had this crusty boogery around my nose that I have to pick at it in order to breathe.\" Patient stated she has no other symptoms but just the crusty nose. Patient kept stating that it is covid and it's from 2005. Patient went on about getting the covid vaccine and if it's safe to get it and what should she do. Communicated to patient I will send the message to the provider's team and someone really reach out to her. Patient wanted Dr. Patel or her nurse to call back at [phone].  "

## 2021-06-16 NOTE — TELEPHONE ENCOUNTER
"Incoming call from patient upset that no one called her about her covid test results. Communicated to patient I can get her to one of the clinical assistants, but she may be on hold due to they are on the other lines. Or I can send a message to the provider's team and someone can reach back out to her. Patient was upset and stated \" I do not want to keep waiting for someone to call me. You guys dont call! Why dont you tell my doctor's team to mail out me a copy of the result so I know to go out or stay inside!\" Patient hung up on me.    Please advise. Thanks!  "

## 2021-06-17 NOTE — PROGRESS NOTES
"    Assessment & Plan     Rosalba was seen today for follow-up.    Diagnoses and all orders for this visit:    Nasal congestion  Chronic nasal discharge  Has not picked up Augmentin prescribed about 3 days ago because she does not know about.  She said her symptoms came on 1415 years ago when she ate a piece of deer meat.  She has gone to see Dr. Christian with [facility] and had a CT scan sinuses in 2017.  We will send her back to ENT for further evaluation.  Flonase did not help her symptoms.  -     Ambulatory referral to ENT    Hypertension  Mildly elevated.  Continue with hydrochlorothiazide 25 mg, losartan 100 mg daily for now.  She will return in 2 weeks for nurse only visit for blood pressure check.    Other orders  -     Td, Preservative Free (green label)       Return in about 5 months (around 10/20/2021) for Recheck, chronic medical conditions.    Xenia Patel MD  [facility]    Subjective   Rosalba Bernal is 65 y.o. and presents today for the following health issues   HPI   Chief Complaint   Patient presents with     Follow-up     follow up on sinus issues. wants to discuss ENT referral      Her sinuses still congested espite trial of flonase. She thinks she has a sinus infection.  I gave her augmentin but hasn't picked up yet.   She thinks she's been having sinus issue  14 years and she believes it's from \"eating deer meat\"  Has seen ENT Dr. Christian before and nothing was found. Had a CT sinuses 2017.   She's \"tired of picking her nose\" as she finds crusty yellow thick discharge from her nose every day.   She can't rinse her nose because she doesn't like the feeling of water up her nose.     Review of Systems  negative      Objective    /68   Pulse 84   Resp 20   Wt (!) 247 lb (112 kg)   BMI 45.91 kg/m    Body mass index is 45.91 kg/m .  Physical Exam  GENERAL: Healthy, alert and no distress  EYES: Eyes grossly normal to inspection. No discharge or erythema, or " obvious scleral/conjunctival abnormalities.  HENT: Normal cephalic/atraumatic.  External ears, nose and mouth without ulcers or lesions. No nasal drainage visible.  HENT: normal cephalic/atraumatic, nasal mucosa edematous  and rhinorrhea yellow  NECK: No asymmetry, masses or scars  RESP: No audible wheeze, cough, or visible cyanosis.  No visible retractions or increased work of breathing.    MSK: FEET:  MF TESTING: NL              SKIN EXAM:  NL              VASCULAR:   R DP  PULSE: +                                      L DP  PULSE: +                                      R PT  PULSE: +                                      L PT  PULSE: +    NEURO: Cranial nerves grossly intact. Mentation and speech appropriate for age.  PSYCH: Mentation appears normal, affect normal/bright, judgement and insight intact, normal speech and appearance well-groomed

## 2021-06-17 NOTE — PROGRESS NOTES
ASSESSMENT: Onychauxis, diabetes with neurologic manifestations, foot pain.    PLAN: Toenails were debrided manually x10. Return to clinic in nine weeks.         SUBJECTIVE: Toenails are long, thick and painful. Last nail debridement in the clinic was February 6, 2018.     OBJECTIVE:  General: Pleasant 62 y.o. female in no acute distress.  Vascular: DP pulses are palpable. PT pulses are palpable. Pedal hair is diminished. Feet are cool to the touch.  Neuro: Sensation in the feet is altered. Patient describes paresthesias.  Derm:  Toenails are elongated, thickened and dystrophic with discoloration and subungual debris. Skin is thin and shiny but intact.   Musculoskeletal: Pes planus.

## 2021-06-17 NOTE — TELEPHONE ENCOUNTER
Requested Prescriptions     Pending Prescriptions Disp Refills     losartan (COZAAR) 50 MG tablet 180 tablet 3     Sig: Take 2 tablets (100 mg total) by mouth daily.     Please send Qty #180. #18 accidentally sent into pharmacy.

## 2021-06-17 NOTE — TELEPHONE ENCOUNTER
I gave her antibiotics. F/u on Thursday for monitoring of progress. I may order CT sinuses if antibiotics don't help. Please keep appointment with me on thursday

## 2021-06-17 NOTE — TELEPHONE ENCOUNTER
"Spoke to pt and relayed MD message. Pt stated that she does not want to go back to ENT. I asked pt what she would like to try then and she said she wants antibiotics. I told her that would need to be discussed with MD at an appt. Pt wanted to follow up with Dr Patel in clinic cause she said she \"cant keep living like this\". Scheduled for thursday  "

## 2021-06-17 NOTE — TELEPHONE ENCOUNTER
Incoming call from patient stating provider had her try flonase, but it made it worst. Patient stops using flonase. Patient would like a call back from Dr. Patel or nurse.

## 2021-06-18 NOTE — PROGRESS NOTES
ASSESSMENT/PLAN  1. Prediabetes  Patient likes to check in every 6 months regarding her prediabetes so we will see her back at that time.  Reassured her regarding normal labs.  Encourage her to continue with efforts at diet and exercise.    2. Visit for screening mammogram  - Mammo Screening Bilateral; Future    3. Morbid obesity  Patient is seen modest weight loss trying to increase fruits and vegetables in her diet as opposed to carb/sugar.  Encouraged her with her efforts.    4. Essential hypertension  Blood pressure reasonably well controlled.  Would like to keep systolic less than 140, ideally less than 130.  Will continue with Cozaar.  - Ambulatory referral to Ophthalmology; Future    5. Screen for colon cancer--- history of polyp  - Ambulatory referral for Colonoscopy        The following high BMI interventions were performed this visit: encouragement to exercise and weight monitoring    Pt states an understanding and agrees to the above plan.  No Follow-up on file.          SUBJECTIVE:   Chief Complaint   Patient presents with     Diabetes     follow up     Hypertension     bp check     Rosalbamicheal Bernal 62 y.o. female    Current Outpatient Prescriptions   Medication Sig Dispense Refill     aspirin 81 MG EC tablet Take 81 mg by mouth daily.       blood glucose test strips Use 1 each As Directed as needed. Dispense brand per patient's insurance at pharmacy discretion. 100 strip 3     blood-glucose meter Misc Test sugars once daily to monitor PRE diabetes 1 each 0     blood-glucose meter Misc Use 1 Device As Directed daily. 1 each 0     diphenhydrAMINE (BENADRYL) 25 mg capsule Take 50 mg by mouth bedtime as needed for itching.       fexofenadine (ALLEGRA) 60 MG tablet Take 60 mg by mouth daily.       fluvastatin (LESCOL XL) 80 mg 24 hr tablet Take 1 tablet (80 mg total) by mouth daily. 90 tablet 3     generic lancets Use 1 each As Directed as needed. Dispense brand per patient's insurance at pharmacy  discretion. 100 each 3     hydroCHLOROthiazide (HYDRODIURIL) 50 MG tablet Take 1 tablet (50 mg total) by mouth daily. 90 tablet 3     hydrocortisone (WESTCORT) 0.2 % cream Thin layer of cream twice daily to R 2nd finger for 2 wks 45 g 0     ibuprofen (ADVIL) 200 MG tablet Take 200 mg by mouth every 6 (six) hours as needed for pain.       losartan (COZAAR) 50 MG tablet Take 1 tablet (50 mg total) by mouth daily. 90 tablet 3     naproxen sodium (ALEVE) 220 MG tablet Take 220 mg by mouth 2 (two) times a day with meals.       nystatin (MYCOSTATIN) cream APPLY TO AFFECTED AREAS TWO TO THREE TIMES A DAY AS DIRECTED 30 g 1     omega-3 fatty acids-vitamin E (FISH OIL) 1,000 mg cap Take by mouth.       ONE TOUCH ULTRASOFT LANCETS MISC Use As Directed 2 times a day at 6:00 am and 4:00 pm.       ONETOUCH ULTRA TEST strips TEST 2 TIMES DAILY. 200 strip 2     risperiDONE (RISPERDAL) 4 MG tablet Take 4 mg by mouth daily.       No current facility-administered medications for this visit.      Allergies: Review of patient's allergies indicates no known allergies.   No LMP recorded. Patient has had a hysterectomy.    HPI:   Stephanie is a 62-year-old female in today for follow-up regarding her medications.  Patient has history of prediabetes and likes to have her labs followed routinely.  She was at health [facility] and reports that her psychiatrist did a full panel of labs she brings those with her.  She had an A1c that was less than 6, normal lipids, normal kidney panel.  She reports compliance with her current hydrochlorothiazide losartan.  She has no chest pain, shortness of breath, lower extremity edema, headaches, or vision changes.  She also takes fluvastatin for her cholesterol with no issues.  She denies need for any current medication refills.  Fermín follows with psychiatry for management of her schizophrenia.  It is unclear when her last visit was.    ROS: negative except as per HPI    OBJECTIVE:   The patient appears well,  alert, oriented x 3, in no distress.  /74 (Patient Site: Left Arm, Patient Position: Sitting, Cuff Size: Adult Large)  Pulse 84  Temp 97.5  F (36.4  C) (Oral)   Resp 18  Wt (!) 244 lb 9 oz (110.9 kg)  BMI 45.46 kg/m2     Lungs: clear, good air entry, no wheezes, rhonchi or rales.   Cardiac: S1 and S2 normal, no murmurs, regular rate and rhythm.   Extremities: show no edema, normal peripheral pulses.   Skin: clear, dry, no rashes/lesions    [facility] reviewed and copied.  Original copy back to patient.

## 2021-06-19 NOTE — LETTER
Letter by Alyson Del Real MD at      Author: Alyson Del Real MD Service: -- Author Type: --    Filed:  Encounter Date: 5/20/2019 Status: (Other)       Rosalba Bernal  [address] [address]                 May 20, 2019      Dear Rosalba:     At your previous appointment with us your blood pressure was elevated and I would like for you to schedule a nurse only appointment to recheck your blood pressure.    We have included a personalized hypertension report card on the following page that lists your most recent blood pressure readings along with your ideal values. Please message us through MyFeelBack or call us at [phone] to schedule your nurse only appointment.    Thank you for including us as members of your health care team.      Sincerely,         Alyson Del Real MD    Enclosure    Hypertension Report Card for Rosalba Bernal  May 20, 2019:     Below is a summary of recent tests related to your hypertension.     Blood Pressure:   Normal blood pressure values are 120/80 or lower. Your blood pressure values should be less than 120/80.      Your most recent blood pressure readings at our clinic were:   BP Readings from Last 3 Encounters:   05/14/18 136/74   10/23/17 136/64   03/03/17 130/74

## 2021-06-19 NOTE — LETTER
"Letter by Xenia Patel MD at      Author: Xenia Patel MD Service: -- Author Type: --    Filed:  Encounter Date: 10/18/2019 Status: Signed         Rosalba Bernal  [address] [facility] [address]  [facility] [address]             October 18, 2019         Dear Ms. Bernal,    Below are the results from your recent visit:    Resulted Orders   Glycosylated Hemoglobin A1c   Result Value Ref Range    Hemoglobin A1c 6.1 (H) 3.5 - 6.0 %   Comprehensive Metabolic Panel   Result Value Ref Range    Sodium 135 (L) 136 - 145 mmol/L    Potassium 3.9 3.5 - 5.0 mmol/L    Chloride 97 (L) 98 - 107 mmol/L    CO2 31 22 - 31 mmol/L    Anion Gap, Calculation 7 5 - 18 mmol/L    Glucose 113 70 - 125 mg/dL    BUN 15 8 - 22 mg/dL    Creatinine 0.90 0.60 - 1.10 mg/dL    GFR MDRD Af Amer >60 >60 mL/min/1.73m2    GFR MDRD Non Af Amer >60 >60 mL/min/1.73m2    Bilirubin, Total 0.5 0.0 - 1.0 mg/dL    Calcium 9.9 8.5 - 10.5 mg/dL    Protein, Total 7.0 6.0 - 8.0 g/dL    Albumin 3.9 3.5 - 5.0 g/dL    Alkaline Phosphatase 79 45 - 120 U/L    AST 22 0 - 40 U/L    ALT 26 0 - 45 U/L    Narrative    Fasting Glucose reference range is 70-99 mg/dL per  American Diabetes Association (ADA) guidelines.   Lipid Crawford FASTING   Result Value Ref Range    Cholesterol 151 <=199 mg/dL    Triglycerides 125 <=149 mg/dL    HDL Cholesterol 46 (L) >=50 mg/dL    LDL Calculated 80 <=129 mg/dL    Patient Fasting > 8hrs? Yes    Microalbumin, Random Urine   Result Value Ref Range    Microalbumin, Random Urine <0.50 0.00 - 1.99 mg/dL    Creatinine, Urine 35.2 mg/dL    Microalbumin/Creatinine Ratio Random Urine        Comment:      \"Unable to calculate: Creatinine and/or Microalbumin value below detectable level\"    Narrative    Microalbumin, Random Urine  <2.0 mg/dL . . . . . . . . Normal  3.0-30.0 mg/dL . . . . . . Microalbuminuria  >30.0 mg/dL . . . . . .  . Clinical Proteinuria    Microalbumin/Creatinine Ratio, Random Urine  <20 mg/g . . . . .. . . . Normal   mg/g " . . . . . . . Microalbuminuria  >300 mg/g . . . . . . . . Clinical Proteinuria       HM2(CBC w/o Differential)   Result Value Ref Range    WBC 7.8 4.0 - 11.0 thou/uL    RBC 4.88 3.80 - 5.40 mill/uL    Hemoglobin 14.5 12.0 - 16.0 g/dL    Hematocrit 43.2 35.0 - 47.0 %    MCV 88 80 - 100 fL    MCH 29.6 27.0 - 34.0 pg    MCHC 33.5 32.0 - 36.0 g/dL    RDW 12.7 11.0 - 14.5 %    Platelets 255 140 - 440 thou/uL    MPV 7.7 7.0 - 10.0 fL   Ferritin   Result Value Ref Range    Ferritin 367 (H) 10 - 130 ng/mL       Urine looks good, does not show much protein.  Sodium is mildly low but not concerning.  Cholesterol similar to last year.    Not sure why her ferritin is mildly abnormal.  We will recheck in 6 months    Please call with questions or contact us using WazeTript.    Sincerely,        Electronically signed by Xenia Patel MD

## 2021-06-21 NOTE — PROGRESS NOTES
ASSESSMENT/PLAN  1. Prediabetes  Patient and I again discussed the fact she does not have diabetes but does have prediabetes.  A1c has not been at 6.5 or above.  Her most recent A1c was actually done through [facility] and was at 6.2% 6 months ago.  She is quite insistent she like it checked again today.  I have advised her to stop checking her blood sugar at home at this time. To avoid developing diabetes, however, would strongly encourage her to follow diabetic diet and work on her exercise with goal of weight loss.  Recheck fasting glucose in 6 months.  - Glycosylated Hemoglobin A1c    2. Morbid Obesity  As above.  Weight noted to be down 6 pounds from a year ago.    3. Hypertension  Patient will continue with current hydrochlorothiazide, losartan.  She refused blood pressure check here today and was advised that we need to check this at each visit.    4. Schizophrenia, chronic condition (H)  Per medication list she is on risperidone.  Patient reports she is compliant with her medication.  Her follow-up with psychiatry/psychology is unclear.    5. Nose irritation -- chronic  Patient advised to try mupirocin that she has been previously given by ENT.  Failing improvement with that, would suggest follow-up with them.  I will not give her oral antibiotics as I do not think they are warranted for this complaint.      The following high BMI interventions were performed this visit: encouragement to exercise and weight monitoring    Pt states an understanding and agrees to the above plan.  Return in about 6 months (around 5/19/2019) for hypertension, fasting glucose.          SUBJECTIVE:   Chief Complaint   Patient presents with     Diabetes     6 month f/u, no concerns     Rosalab Bernal 63 y.o. female    Current Outpatient Medications   Medication Sig Dispense Refill     aspirin 81 MG EC tablet Take 81 mg by mouth daily.       diphenhydrAMINE (BENADRYL) 25 mg capsule Take 50 mg by mouth bedtime as needed for  "itching.       fexofenadine (ALLEGRA) 60 MG tablet Take 60 mg by mouth daily.       fluvastatin (LESCOL XL) 80 mg 24 hr tablet Take 1 tablet (80 mg total) by mouth daily. 90 tablet 1     hydroCHLOROthiazide (HYDRODIURIL) 50 MG tablet Take 1 tablet (50 mg total) by mouth daily. 90 tablet 1     hydrocortisone (WESTCORT) 0.2 % cream Thin layer of cream twice daily to R 2nd finger for 2 wks 45 g 0     ibuprofen (ADVIL) 200 MG tablet Take 200 mg by mouth every 6 (six) hours as needed for pain.       losartan (COZAAR) 50 MG tablet Take 1 tablet (50 mg total) by mouth daily. 90 tablet 1     naproxen sodium (ALEVE) 220 MG tablet Take 220 mg by mouth 2 (two) times a day with meals.       omega-3 fatty acids-vitamin E (FISH OIL) 1,000 mg cap Take by mouth.       risperiDONE (RISPERDAL) 4 MG tablet Take 4 mg by mouth daily.       nystatin (MYCOSTATIN) cream APPLY TO AFFECTED AREAS TWO TO THREE TIMES A DAY AS DIRECTED 30 g 1     No current facility-administered medications for this visit.      Allergies: Patient has no known allergies.   No LMP recorded. Patient has had a hysterectomy.    HPI:   Stephanie is a 63-year-old female with paranoid schizophrenia, hypertension, diabetes who is in for medication check.  She is not fasting today.  She does not bring a blood sugar record with her.  We discussed previously that she does not in fact have diabetes but does have prediabetes as her A1c remains below 6.5.  She [facility] health provider is and last A1c was 6.2% in April.  At that time she also had BMP liver panel CBC thyroid and lipids completed--- all labs were within normal limits.  She is refusing her blood pressure today stating the blood pressure cuff hurts too much when they are trying to take it.  She also declines manual cuff.  She states\" I feel normal\".  She denies chest pain, headache, shortness of breath, lower extremity edema, vision changes.  She admits she is likely to " "cancel her appointment with her ophthalmologist next week if she is very concerned that they will put the wrong drops in her eyes because\" no one is monitoring those\".  She denies that she is having any current eye complaints.  Stephanie reports that she is taking her medications as prescribed and denies any side effects or concerns.  She does not need any refills.  Routine healthcare maintenance is reviewed.  She is up-to-date on mammogram.  She is refusing seasonal flu shot or pneumonia vaccination.  She also is refusing colon cancer screening.    ROS: negative except as per HPI    OBJECTIVE:   The patient appears well, alert, oriented but a bit paranoid today.  Pulse 93   Temp 98.5  F (36.9  C) (Oral)   Resp 24   Wt (!) 244 lb 4.3 oz (110.8 kg)   SpO2 96%   BMI 45.41 kg/m      Lungs: clear, good air entry, no wheezes, rhonchi or rales.   Cardiac: S1 and S2 normal, no murmurs, regular rate and rhythm.  Carotids without bruits  Extremities: show no edema, normal peripheral pulses.   Neurological: normal, no focal findings.  Skin: clear, dry, no rashes/lesions    Results for orders placed or performed in visit on 11/19/18   Glycosylated Hemoglobin A1c   Result Value Ref Range    Hemoglobin A1c 6.4 (H) 3.5 - 6.0 %         "

## 2021-06-21 NOTE — LETTER
Letter by Xenia Patel MD at      Author: Xenia Patel MD Service: -- Author Type: --    Filed:  Encounter Date: 4/6/2021 Status: (Other)         Rosalba Bernal  [address]  [facility] [address]             April 6, 2021         Dear Ms. Bernal,    Below are the results from your recent visit:    Resulted Orders   Glycosylated Hemoglobin A1c   Result Value Ref Range    Hemoglobin A1c 6.0 (H) <=5.6 %   Comprehensive Metabolic Panel   Result Value Ref Range    Sodium 135 (L) 136 - 145 mmol/L    Potassium 3.5 3.5 - 5.0 mmol/L    Chloride 97 (L) 98 - 107 mmol/L    CO2 25 22 - 31 mmol/L    Anion Gap, Calculation 13 5 - 18 mmol/L    Glucose 100 70 - 125 mg/dL    BUN 15 8 - 22 mg/dL    Creatinine 0.74 0.60 - 1.10 mg/dL    GFR MDRD Af Amer >60 >60 mL/min/1.73m2    GFR MDRD Non Af Amer >60 >60 mL/min/1.73m2    Bilirubin, Total 0.4 0.0 - 1.0 mg/dL    Calcium 9.0 8.5 - 10.5 mg/dL    Protein, Total 6.5 6.0 - 8.0 g/dL    Albumin 3.7 3.5 - 5.0 g/dL    Alkaline Phosphatase 83 45 - 120 U/L    AST 25 0 - 40 U/L    ALT 28 0 - 45 U/L    Narrative    Fasting Glucose reference range is 70-99 mg/dL per  American Diabetes Association (ADA) guidelines.       a1c remained same as before - she has prediabetes. Please reduce intake of sweet/sugar.   Rest of lab stable.     Please call with questions or contact us using TechZel.    Sincerely,        Electronically signed by Xenia Patel MD

## 2021-06-21 NOTE — LETTER
Letter by Xenia Patel MD at      Author: Xenia Patel MD Service: -- Author Type: --    Filed:  Encounter Date: 4/8/2021 Status: (Other)         Rosalba Bernal  [address]  [facility] [address]             April 8, 2021         Dear Ms. Bernal,    Below are the results from your recent visit:    Resulted Orders   COVID-19 Virus PCR MRF   Result Value Ref Range    COVID-19 VIRUS SPECIMEN SOURCE Nasopharyngeal     2019-nCOV Not Detected       Comment:      Collection of multiple specimens from the same patient may be necessary to  detect the virus. The possibility of a false negative should be considered if  the patient's recent exposure or clinical presentation suggests 2019 nCOV  infection and diagnostic tests for other causes of illness are negative. Repeat  testing may be considered in this setting.  Patient sample was heat inactivated and amplified using the HDPCR SARS-CoV-2  assay ([employer] The HDPCRTM SARS-CoV-2 assay is a reverse  transcription real-time polymerase chain reaction (qRT-PCR) test intended for  the qualitative detection of nucleic acid  from SARS-CoV-2 in human nasopharyngeal swabs, oropharyngeal swabs, anterior  nasal swabs, mid-turbinate nasal swabs as well as nasal aspirate, nasal wash,  and bronchoalveolar lavage (BAL) specimens from individuals who are suspected  of COVID-19 by their healthcare provider.  A negative result does not rule out the presence of real-time PCR inhibitors in  the specimen or  COVID-19 RNA in concentrations below the limit of detection of  the assay. The possibility of a false negative should be considered if the  patients recent exposure or clinical presentation suggests COVID-19. Additional  testing or repeat testing requires consultation with the laboratory.  Nasopharyngeal specimen is the preferred choice for swab-based SARS CoV2  testing. When collection of a nasopharyngeal swab is not possible the following  are acceptable  alternatives:  an oropharyngeal (OP) specimen collected by a healthcare professional, or a  nasal mid-turbinate (NMT) swab collected by a healthcare professional or by  onsite self-collection (using a flocked tapered swab), or an anterior nares  specimen collected by a healthcare professional or by onsite self-collection  (using a round foam swab). (Centers for Disease Control)  Testing performed by [facility] ([facility]) [address] [address]  The  test performance characteristics were determined by [facility]. It has not been  cleared or approved by the FDA.  The laboratory is regulated under the Clinical Laboratory Improvement  Amendments of 1988 (CLIA-88) as qualified to perform high-complexity testing.  This test is used for clinical purposes. It should not be regarded as  investigational or for research.    Performed and/or entered by:  [facility]  [address] [place] [address]    Glycosylated Hemoglobin A1c   Result Value Ref Range    Hemoglobin A1c 6.0 (H) <=5.6 %   Comprehensive Metabolic Panel   Result Value Ref Range    Sodium 135 (L) 136 - 145 mmol/L    Potassium 3.5 3.5 - 5.0 mmol/L    Chloride 97 (L) 98 - 107 mmol/L    CO2 25 22 - 31 mmol/L    Anion Gap, Calculation 13 5 - 18 mmol/L    Glucose 100 70 - 125 mg/dL    BUN 15 8 - 22 mg/dL    Creatinine 0.74 0.60 - 1.10 mg/dL    GFR MDRD Af Amer >60 >60 mL/min/1.73m2    GFR MDRD Non Af Amer >60 >60 mL/min/1.73m2    Bilirubin, Total 0.4 0.0 - 1.0 mg/dL    Calcium 9.0 8.5 - 10.5 mg/dL    Protein, Total 6.5 6.0 - 8.0 g/dL    Albumin 3.7 3.5 - 5.0 g/dL    Alkaline Phosphatase 83 45 - 120 U/L    AST 25 0 - 40 U/L    ALT 28 0 - 45 U/L    Narrative    Fasting Glucose reference range is 70-99 mg/dL per  American Diabetes Association (ADA) guidelines.   COVID-19 Tristan RBD Pamella and Reflex Titer   Result Value Ref Range    COVID-19 Antibody Screen  Negative       Comment:      No COVID-19 antibodies detected. Patients with recent COVID-19 vaccination or  recent symptom onset from COVID-19 infection may not produce sufficient levels  of detectable antibodies. Immunocompromised COVID-19 patients may take longer  to develop antibodies.    COVID-19 IgG Titer Not Applicable       Comment:      Qualitative screen for IgG, IgM and IgA antibodies to COVID-19 (SARS-CoV-2)  spike receptor binding domain (RBD) protein. COVID-19 spike RBD antibodies may  be elevated due to vaccination, or a past or current COVID-19 infection.  The qualitative screen for IgG, IgM and IgA COVID-19 spike RBD antibodies is  performed on the [employer], and this test is approved by the FDA under the  Emergency Use Authorization (EUA).  Negative results do not rule out COVID-19 infection. Results from antibody  testing should not be used as the sole basis to diagnose or exclude SARS-CoV-2  infection or to inform infection status. COVID-19 PCR test should be ordered if  current infection is suspected. False positive results may occur in rare cases  due to cross-reacting antibodies.  Testing performed by [facility], [facility], [address] [place], [place] [address]e S, [address], [place] [address]   SARS-CoV-2 Nucleocapsid Total Ab, Serum (COVTA)   Result Value Ref Range    SARS-CoV-2 Nucleocapsid Total Ab, S Negative Negative      Comment:      No antibodies to SARS-CoV-2 detected. Negative results   may occur in serum collected too soon following   infection,in immunosuppressed patients or in patients   with mild or asymptomatic infection. This test does   not rule out active or recent COVID-19 infection and   will not detect SARS-CoV-2 vaccine-induced antibodies.  Follow up testing with a molecular test is recommended   in symptomatic patients.     -------------------ADDITIONAL INFORMATION-------------------  Testing was performed using the Roche Um[employer] Productss    Anti-SARS-CoV-2 Reagent assay from [employer],   which has received Emergency Use Authorization(EUA)  by the U.S. Food and Drug Administration.     Fact sheets for this Emergency Use Authorization (EUA)   assay can be found at the following links:      For Healthcare Providers:  https://www.fda.gov/media/967137/download  For Patients:  https://www.fda.gov/media/456794/download    Patient's Race  White     Patient's Ethnicity Unknown       Comment:         Test Performed by:  [place] [address]  : Gokul Romero M.D. Ph.D.; CLIA# [id number]        Covid testing is negative    Please call with questions or contact us using Picitupt.    Sincerely,        Electronically signed by Xenia Patel MD

## 2021-06-23 NOTE — TELEPHONE ENCOUNTER
FYI - Status Update  Who is Calling: Patient  Update: States she finally made an Eye appointment. Will be with Dr Bustos on 3/13/2019 at [facility].  Okay to leave a detailed message?:  No return call needed

## 2021-06-25 NOTE — PROGRESS NOTES
Please call patient: I have reviewed blood pressure reading. BP better. No change in medication regimen

## 2021-06-25 NOTE — PROGRESS NOTES
Follow Up Blood Pressure Check    Rosalba Bernal is a 65 y.o. female recommended to follow up for blood pressure check by Xeina Patel MD. Anihypertensive medications and adherence were verified: Yes.     Reason for visit: High at last office visit     Medication change at last visit: Currently on blood pressure medications     Today's Vitals:   Vitals:    06/01/21 1332 06/01/21 1339   BP: 144/65 134/60   Patient Site: Left Arm Left Arm   Patient Position: Sitting Sitting   Cuff Size: Thigh Thigh   Pulse: 92 93           Lowest blood pressure today is less than 140/90 and they deny signs or symptoms of new onset: DENY, severe headache, fatigue, confusion, vision changes, chest pain, pounding in the chest, neck, ears, irregular heartbeat, difficulty breathing and blood in the urine.  Please inform patient of his/her blood pressure today.  If they are asymptomatic, the patient is to continue current medications.  This message will be routed to their provider, and they will be notified if a change in medication is recommended.        Anabella Teixeira    Current Outpatient Medications   Medication Sig Dispense Refill     aspirin 81 MG EC tablet Take 81 mg by mouth daily.       diphenhydrAMINE (BENADRYL) 25 mg capsule Take 25 mg by mouth at bedtime.              fexofenadine (ALLEGRA) 60 MG tablet Take 60 mg by mouth daily.       hydroCHLOROthiazide (HYDRODIURIL) 25 MG tablet Take 1 tablet (25 mg) by mouth ONCE daily. 90 tablet 2     ibuprofen (ADVIL) 200 MG tablet Take 200 mg by mouth every 6 (six) hours as needed for pain.       losartan (COZAAR) 50 MG tablet Take 2 tablets (100 mg total) by mouth daily. 180 tablet 3     naproxen sodium (ALEVE) 220 MG tablet Take 220 mg by mouth 2 (two) times a day with meals.       omega-3 fatty acids-vitamin E (FISH OIL) 1,000 mg cap Take 2 capsules by mouth.              pravastatin (PRAVACHOL) 40 MG tablet Take 1 tablet (40 mg) by mouth every evening. 90 tablet 2      risperiDONE (RISPERDAL) 4 MG tablet Take 4 mg by mouth daily.       risperiDONE (RISPERDAL) 4 MG tablet Take 4 mg by mouth.       No current facility-administered medications for this visit.

## 2021-06-25 NOTE — TELEPHONE ENCOUNTER
----- Message from Xenia Patel MD sent at 6/3/2021 11:07 AM CDT -----      ----- Message -----  From: Anabella Teixeira CMA  Sent: 6/1/2021   1:44 PM CDT  To: Xenia Patel MD

## 2021-06-25 NOTE — TELEPHONE ENCOUNTER
Left message to call back for: Bp check  Information to relay to patient:  Phone line was busy. Please relay message below from provider.               Xenia Patel MD at 6/3/2021 11:07 AM    Status: Signed      Please call patient: I have reviewed blood pressure reading. BP better. No change in medication regimen

## 2021-06-30 NOTE — PROGRESS NOTES
Progress Notes by Christelle Leyva LPN at 4/20/2021  1:30 PM     Author: Christelle Leyva LPN Service: -- Author Type: Licensed Nurse    Filed: 4/20/2021  1:45 PM Encounter Date: 4/20/2021 Status: Signed    : Christelle Leyva LPN (Licensed Nurse)       Follow Up Blood Pressure Check    Rosalba Bernal is a 65 y.o. female recommended to follow up for blood pressure check by Xenia Patel MD. Anihypertensive medications and adherence were verified: Yes.     Reason for visit: Elevated BP last OV 04/05/21      Medication change at last visit: Losartan increased to 100 mg daily.    Today's Vitals:   Vitals:    04/20/21 1331 04/20/21 1343   BP: 153/75 136/58   Patient Site: Left Arm Left Arm   Patient Position: Sitting Sitting   Cuff Size: Adult Large Adult Large   Pulse: 95 82       Lowest blood pressure today is less than 140/90 and they deny signs or symptoms of new onset: severe headache, fatigue, confusion, vision changes, chest pain, pounding in the chest, neck, ears, irregular heartbeat, difficulty breathing and blood in the urine.  Please inform patient of his/her blood pressure today.  If they are asymptomatic, the patient is to continue current medications.  This message will be routed to their provider, and they will be notified if a change in medication is recommended.      Current Outpatient Medications   Medication Sig Dispense Refill   ? aspirin 81 MG EC tablet Take 81 mg by mouth daily.     ? diphenhydrAMINE (BENADRYL) 25 mg capsule Take 25 mg by mouth at bedtime.            ? fexofenadine (ALLEGRA) 60 MG tablet Take 60 mg by mouth daily.     ? fluticasone propionate (FLONASE) 50 mcg/actuation nasal spray 2 sprays into each nostril 2 (two) times a day. 16 g 1   ? hydroCHLOROthiazide (HYDRODIURIL) 25 MG tablet Take 1 tablet (25 mg) by mouth ONCE daily. 90 tablet 2   ? ibuprofen (ADVIL) 200 MG tablet Take 200 mg by mouth every 6 (six) hours as needed for pain.     ? losartan (COZAAR) 50 MG  tablet Take 2 tablets (100 mg total) by mouth daily. 18 tablet 1   ? naproxen sodium (ALEVE) 220 MG tablet Take 220 mg by mouth 2 (two) times a day with meals.     ? omega-3 fatty acids-vitamin E (FISH OIL) 1,000 mg cap Take 2 capsules by mouth.            ? pravastatin (PRAVACHOL) 40 MG tablet Take 1 tablet (40 mg) by mouth every evening. 90 tablet 2   ? risperiDONE (RISPERDAL) 4 MG tablet Take 4 mg by mouth daily.       No current facility-administered medications for this visit.

## 2021-07-03 NOTE — ADDENDUM NOTE
Addendum Note by Alyson Womack MD at 3/1/2018  4:27 PM     Author: Alyson Womack MD Service: -- Author Type: Physician    Filed: 3/1/2018  4:27 PM Encounter Date: 2/27/2018 Status: Signed    : Alyson Womack MD (Physician)    Addended by: ALYSON WOMACK on: 3/1/2018 04:27 PM        Modules accepted: Orders

## 2021-07-03 NOTE — ADDENDUM NOTE
Addendum Note by Rhiannon Fabian CMA at 3/1/2018  8:17 AM     Author: Rhiannon Fabian CMA Service: -- Author Type: Certified Medical Assistant    Filed: 3/1/2018  8:17 AM Encounter Date: 2/27/2018 Status: Signed    : Rhiannon Fabian CMA (Certified Medical Assistant)    Addended by: RHIANNON FABIAN on: 3/1/2018 08:17 AM        Modules accepted: Orders

## 2021-07-05 ENCOUNTER — COMMUNICATION - HEALTHEAST (OUTPATIENT)
Dept: FAMILY MEDICINE | Facility: CLINIC | Age: 66
End: 2021-07-05

## 2021-07-21 ENCOUNTER — RECORDS - HEALTHEAST (OUTPATIENT)
Dept: ADMINISTRATIVE | Facility: CLINIC | Age: 66
End: 2021-07-21

## 2021-07-22 NOTE — LETTER
"Letter by Xenia Patel MD at      Author: Xenia Patel MD Service: -- Author Type: --    Filed:  Encounter Date: 7/5/2021 Status: (Other)         Rosalba Bernal  [address] [facility] [address]  [place] [address]             July 5, 2021    Dear Rosalba Bernal,     This is a reminder that it is time to make your appointment for your annual breast imaging. Our records indicate your last breast imaging was performed on 6/8/18. You may make an appointment for your breast imaging by calling our scheduling line [phone] or by visiting [employer], clicking on the \"Visits\" tab and selecting \"Schedule An Appointment.    If you are experiencing breast symptoms or concerns such as a new lump or breast pain you should first contact your health care team before scheduling your breast imaging.  Your healthcare professional may want to see you prior to your visit.    As you know, early detection of cancer is very important. Currently the American College of Radiology and the Society of Breast Imaging recommend annual breast imaging beginning at the age of 40.    If you are a patient currently enrolled in the [place] [facility] Program or the [facility], you must be seen by your health care team for a clinical breast examination prior to your breast imaging.    If you have already made an appointment, please disregard this letter.    Thank you and we look forward to seeing you in the near future for your annual breast imaging.    Sincerely,    Your primary care team at [facility]         "

## 2021-09-02 ENCOUNTER — TELEPHONE (OUTPATIENT)
Dept: FAMILY MEDICINE | Facility: CLINIC | Age: 66
End: 2021-09-02

## 2021-09-02 NOTE — TELEPHONE ENCOUNTER
Incoming call from patient wanting to let Dr. Patel know that she has some labs done a week ago with [facility]. Patient just wanted to let provider, no call back needed since she will be seeing provider in October.

## 2021-09-13 NOTE — TELEPHONE ENCOUNTER
Not sure what lab. I tried to look at careeverywhere but didn't see anything. Please ask if these labs were ordered by her psychiatrist?

## 2021-10-01 ENCOUNTER — OFFICE VISIT (OUTPATIENT)
Dept: FAMILY MEDICINE | Facility: CLINIC | Age: 66
End: 2021-10-01
Payer: MEDICARE

## 2021-10-01 VITALS
DIASTOLIC BLOOD PRESSURE: 71 MMHG | WEIGHT: 246.8 LBS | BODY MASS INDEX: 45.88 KG/M2 | SYSTOLIC BLOOD PRESSURE: 140 MMHG | HEART RATE: 94 BPM | RESPIRATION RATE: 16 BRPM

## 2021-10-01 DIAGNOSIS — F20.9 SCHIZOPHRENIA, CHRONIC CONDITION (H): ICD-10-CM

## 2021-10-01 DIAGNOSIS — E66.01 MORBID OBESITY (H): ICD-10-CM

## 2021-10-01 DIAGNOSIS — I10 ESSENTIAL HYPERTENSION: ICD-10-CM

## 2021-10-01 DIAGNOSIS — E11.42 TYPE 2 DIABETES MELLITUS WITH DIABETIC POLYNEUROPATHY, WITHOUT LONG-TERM CURRENT USE OF INSULIN (H): Primary | ICD-10-CM

## 2021-10-01 LAB
ALBUMIN SERPL-MCNC: 3.8 G/DL (ref 3.5–5)
ALP SERPL-CCNC: 79 U/L (ref 45–120)
ALT SERPL W P-5'-P-CCNC: 23 U/L (ref 0–45)
ANION GAP SERPL CALCULATED.3IONS-SCNC: 14 MMOL/L (ref 5–18)
AST SERPL W P-5'-P-CCNC: 22 U/L (ref 0–40)
BILIRUB DIRECT SERPL-MCNC: 0.2 MG/DL
BILIRUB SERPL-MCNC: 0.5 MG/DL (ref 0–1)
BUN SERPL-MCNC: 21 MG/DL (ref 8–22)
CALCIUM SERPL-MCNC: 9.5 MG/DL (ref 8.5–10.5)
CHLORIDE BLD-SCNC: 102 MMOL/L (ref 98–107)
CO2 SERPL-SCNC: 24 MMOL/L (ref 22–31)
CREAT SERPL-MCNC: 0.95 MG/DL (ref 0.6–1.1)
CREAT UR-MCNC: 40 MG/DL
GFR SERPL CREATININE-BSD FRML MDRD: 63 ML/MIN/1.73M2
GLUCOSE BLD-MCNC: 106 MG/DL (ref 70–125)
HBA1C MFR BLD: 6.1 % (ref 0–5.6)
MICROALBUMIN UR-MCNC: <0.5 MG/DL (ref 0–1.99)
MICROALBUMIN/CREAT UR: NORMAL MG/G{CREAT}
POTASSIUM BLD-SCNC: 4.6 MMOL/L (ref 3.5–5)
PROT SERPL-MCNC: 6.7 G/DL (ref 6–8)
SODIUM SERPL-SCNC: 140 MMOL/L (ref 136–145)

## 2021-10-01 PROCEDURE — 83036 HEMOGLOBIN GLYCOSYLATED A1C: CPT | Performed by: FAMILY MEDICINE

## 2021-10-01 PROCEDURE — 82248 BILIRUBIN DIRECT: CPT | Performed by: FAMILY MEDICINE

## 2021-10-01 PROCEDURE — 36415 COLL VENOUS BLD VENIPUNCTURE: CPT | Performed by: FAMILY MEDICINE

## 2021-10-01 PROCEDURE — 82043 UR ALBUMIN QUANTITATIVE: CPT | Performed by: FAMILY MEDICINE

## 2021-10-01 PROCEDURE — 99214 OFFICE O/P EST MOD 30 MIN: CPT | Performed by: FAMILY MEDICINE

## 2021-10-01 PROCEDURE — 80053 COMPREHEN METABOLIC PANEL: CPT | Performed by: FAMILY MEDICINE

## 2021-10-01 RX ORDER — CHLORAL HYDRATE 500 MG
2 CAPSULE ORAL
COMMUNITY

## 2021-10-01 NOTE — PROGRESS NOTES
"    Assessment & Plan     Type 2 diabetes mellitus with diabetic polyneuropathy, without long-term current use of insulin (H)  a1c 6.1. stable. Diet controlled.   - Hemoglobin A1c  - Hemoglobin A1c  - Albumin Random Urine Quantitative with Creat Ratio  - Albumin Random Urine Quantitative with Creat Ratio    Schizophrenia, chronic condition (H)  Reviewed visit via [facility] psychiatry. Lab today.   - Basic metabolic panel  (Ca, Cl, CO2, Creat, Gluc, K, Na, BUN)  - Hepatic panel (Albumin, ALT, AST, Bili, Alk Phos, TP)  - Basic metabolic panel  (Ca, Cl, CO2, Creat, Gluc, K, Na, BUN)  - Hepatic panel (Albumin, ALT, AST, Bili, Alk Phos, TP)    Morbid obesity (H)  Discussed diet/exercise    Hypertension  Borderline. Continue with current meds without change.       BMI:   Estimated body mass index is 45.88 kg/m  as calculated from the following:    Height as of 10/16/20: 1.562 m (5' 1.5\").    Weight as of this encounter: 111.9 kg (246 lb 12.8 oz).   Weight management plan: Discussed healthy diet and exercise guidelines      No follow-ups on file.    Xenia Patel MD  [facility]    Carlos Navarrete is a 65 year old who presents for the following health issues     HPI   Chief Complaint   Patient presents with     Diabetes     Hgb A1c 6.0 on 4/5/21     Declines mammogram for this year.  Declines colonoscopy for this year. May change her mind in a year.  Trying to lose weight. She doesn't walk as she's waiting for new orthotics.  Lives alone in an apartment complex. Ex  passed 2011. Has 2 children. Has 4 grandson.     Review of Systems   Constitutional, HEENT, cardiovascular, pulmonary, gi and gu systems are negative, except as otherwise noted.      Objective    BP (!) 143/62 (BP Location: Left arm, Patient Position: Sitting, Cuff Size: Adult Large)   Pulse 95   Resp 16   Wt 111.9 kg (246 lb 12.8 oz)   BMI 45.88 kg/m    Body mass index is 45.88 " kg/m .  Physical Exam   GENERAL: healthy, alert and no distress  NECK: no adenopathy, no asymmetry, masses, or scars and thyroid normal to palpation  RESP: lungs clear to auscultation - no rales, rhonchi or wheezes  CV: regular rate and rhythm, normal S1 S2, no S3 or S4, no murmur, click or rub, no peripheral edema and peripheral pulses strong  ABDOMEN: soft, nontender, no hepatosplenomegaly, no masses and bowel sounds normal  MS: no gross musculoskeletal defects noted, no edema  NEURO: Normal strength and tone, mentation intact and speech normal  PSYCH: mentation appears normal, affect normal/bright

## 2021-10-01 NOTE — LETTER
October 7, 2021      Rosalba Bernal  [address] [facility] [address]  [place] [address]        Dear ,    We are writing to inform you of your test results.        Resulted Orders   Hemoglobin A1c   Result Value Ref Range    Hemoglobin A1C 6.1 (H) 0.0 - 5.6 %      Comment:      Normal <5.7%   Prediabetes 5.7-6.4%    Diabetes 6.5% or higher     Note: Adopted from ADA consensus guidelines.   Basic metabolic panel  (Ca, Cl, CO2, Creat, Gluc, K, Na, BUN)   Result Value Ref Range    Sodium 140 136 - 145 mmol/L    Potassium 4.6 3.5 - 5.0 mmol/L    Chloride 102 98 - 107 mmol/L    Carbon Dioxide (CO2) 24 22 - 31 mmol/L    Anion Gap 14 5 - 18 mmol/L    Urea Nitrogen 21 8 - 22 mg/dL    Creatinine 0.95 0.60 - 1.10 mg/dL    Calcium 9.5 8.5 - 10.5 mg/dL    Glucose 106 70 - 125 mg/dL    GFR Estimate 63 >60 mL/min/1.73m2      Comment:      As of July 11, 2021, eGFR is calculated by the CKD-EPI creatinine equation, without race adjustment. eGFR can be influenced by muscle mass, exercise, and diet. The reported eGFR is an estimation only and is only applicable if the renal function is stable.   Hepatic panel (Albumin, ALT, AST, Bili, Alk Phos, TP)   Result Value Ref Range    Bilirubin Total 0.5 0.0 - 1.0 mg/dL    Bilirubin Direct 0.2 <=0.5 mg/dL    Protein Total 6.7 6.0 - 8.0 g/dL    Albumin 3.8 3.5 - 5.0 g/dL    Alkaline Phosphatase 79 45 - 120 U/L    AST 22 0 - 40 U/L    ALT 23 0 - 45 U/L   Albumin Random Urine Quantitative with Creat Ratio   Result Value Ref Range    Microalbumin Urine mg/dL <0.50 0.00 - 1.99 mg/dL    Creatinine Urine mg/dL 40 mg/dL    Microalbumin Urine mg/g Cr        Comment:      Unable to calculate:  Urine creatinine or microalbumin value below detectable level    Narrative    Microalbumin, Random Urine   <2.0 mg/dL . . . . . . . . Normal   3.0-30.0 mg/dL . . . . . . Microalbuminuria   >30.0 mg/dL . . . . . .  . Clinical Proteinuria     Microalbumin/Creatinine Ratio, Random Urine   <20 mg/g . . . . .. .  . . Normal    mg/g . . . . . . . Microalbuminuria   >300 mg/g . . . . . . . . Clinical Proteinuria       If you have any questions or concerns, please call the clinic at the number listed above.       Sincerely,      Xenia Patel MD

## 2021-10-07 ENCOUNTER — TELEPHONE (OUTPATIENT)
Dept: FAMILY MEDICINE | Facility: CLINIC | Age: 66
End: 2021-10-07

## 2021-10-07 ENCOUNTER — NURSE TRIAGE (OUTPATIENT)
Dept: NURSING | Facility: CLINIC | Age: 66
End: 2021-10-07

## 2021-10-07 NOTE — TELEPHONE ENCOUNTER
----- Message from Xenia Patel MD sent at 10/4/2021 12:21 PM CDT -----  Please call patient: rest of lab all normal. Please send result letter with above message

## 2021-10-07 NOTE — TELEPHONE ENCOUNTER
"  Few days ago had a vaginal scratch  Went away  Hx of genital herpes  Now pt is having difficulty urinating  \"feels like something is going to drop out\"  Hard to get the last few drops out  Right flank/back pain    Pain with urination: 9/10    Disposition: Go to office now. Pt given care advice and transferred to scheduling.     Cris Pascal RN  [facility] Nurse Advisor 8:20 AM 10/7/2021    Reason for Disposition    Discomfort (pain, burning or stinging) when passing urine and female    SEVERE pain with urination    Additional Information    Negative: Shock suspected (e.g., cold/pale/clammy skin, too weak to stand, low BP, rapid pulse)    Negative: Sounds like a life-threatening emergency to the triager    Negative: Followed a genital area injury    Negative: Followed a genital area injury (penis, scrotum)    Negative: Vaginal discharge    Negative: Pus (white, yellow) or bloody discharge from end of penis    Negative: Discomfort (pain, burning or stinging) when passing urine and pregnant    Negative: Shock suspected (e.g., cold/pale/clammy skin, too weak to stand, low BP, rapid pulse)    Negative: Sounds like a life-threatening emergency to the triager    Negative: Unable to urinate (or only a few drops) and bladder feels very full    Negative: Vomiting    Negative: Patient sounds very sick or weak to the triager    Protocols used: URINARY SYMPTOMS-A-OH, URINATION PAIN - FEMALE-A-OH    COVID 19 Nurse Triage Plan/Patient Instructions    Please be aware that novel coronavirus (COVID-19) may be circulating in the community. If you develop symptoms such as fever, cough, or SOB or if you have concerns about the presence of another infection including coronavirus (COVID-19), please contact your health care provider or visit [email or web address].     Disposition/Instructions    In-Person Visit with provider recommended. Reference Visit Selection Guide.    Thank you for taking steps to prevent the spread of " this virus.  o Limit your contact with others.  o Wear a simple mask to cover your cough.  o Wash your hands well and often.    Resources    [facility]: About COVID-19: [email or web address]    CDC: What to Do If You're Sick: www.cdc.gov/coronavirus/2019-ncov/about/steps-when-sick.html    CDC: Ending Home Isolation: www.cdc.gov/coronavirus/2019-ncov/hcp/disposition-in-home-patients.html     CDC: Caring for Someone: www.cdc.gov/coronavirus/2019-ncov/if-you-are-sick/care-for-someone.html     [facility]: Interim Guidance for Hospital Discharge to Home: [email or web address]/coronavirus/hcp/hospdischarge.pdf    [facility] clinical trials (COVID-19 research studies): clinicalaffairs.[facility]-clinical-trials     Below are the COVID-19 hotlines at the [place] Department of Health ([facility]). Interpreters are available.   o For health questions: Call 713-050-1357 or 1-944.179.8292 (7 a.m. to 7 p.m.)  o For questions about schools and childcare: Call 591-910-2842 or 1-104.529.2384 (7 a.m. to 7 p.m.)

## 2021-11-23 DIAGNOSIS — E78.00 HYPERCHOLESTEREMIA: ICD-10-CM

## 2021-11-23 DIAGNOSIS — I10 ESSENTIAL HYPERTENSION: ICD-10-CM

## 2021-11-23 NOTE — TELEPHONE ENCOUNTER
Reason for Call:  Medication or medication refill:    Do you use a [facility]?  Name of the pharmacy and phone number for the current request:  [id number] - [place], [place] - [facility]  [phone]    Name of the medication requested:   - hydroCHLOROthiazide (HYDRODIURIL) 25 MG tablet  - pravastatin (PRAVACHOL) 40 MG tablet    Other request: Pt would like if provider can send before the holiday.     Can we leave a detailed message on this number? YES    Phone number patient can be reached at: Home number on file [phone] (home)    Best Time: Any time    Call taken on 11/23/2021 at 4:56 PM by Lisa Rankin

## 2021-11-24 RX ORDER — HYDROCHLOROTHIAZIDE 25 MG/1
TABLET ORAL
Qty: 90 TABLET | Refills: 3 | Status: SHIPPED | OUTPATIENT
Start: 2021-11-24 | End: 2022-11-18

## 2021-11-24 RX ORDER — PRAVASTATIN SODIUM 40 MG
TABLET ORAL
Qty: 90 TABLET | Refills: 3 | Status: SHIPPED | OUTPATIENT
Start: 2021-11-24 | End: 2022-08-22

## 2021-11-24 NOTE — TELEPHONE ENCOUNTER
Pending Prescriptions:                       Disp   Refills    hydrochlorothiazide (HYDRODIURIL) 25 MG t*90 tab*3            Sig: [HYDROCHLOROTHIAZIDE (HYDRODIURIL) 25 MG TABLET]           Take 1 tablet (25 mg) by mouth ONCE daily.    pravastatin (PRAVACHOL) 40 MG tablet      90 tab*3            Sig: [PRAVASTATIN (PRAVACHOL) 40 MG TABLET] Take 1           tablet (40 mg) by mouth every evening.

## 2021-12-30 ENCOUNTER — TELEPHONE (OUTPATIENT)
Dept: FAMILY MEDICINE | Facility: CLINIC | Age: 66
End: 2021-12-30
Payer: MEDICARE

## 2021-12-30 NOTE — TELEPHONE ENCOUNTER
I can fill out but not sure why she needs handicap parking. I don't recall discussing with her any limitation in the past. Please clarify with her.

## 2021-12-30 NOTE — TELEPHONE ENCOUNTER
Reason for Call:  Other Handicap inquiry    Detailed comments: Rosalba is hoping to get handicap parking and wondering if she need an appointment or if  just needs to fill out paperwork    Phone Number Patient can be reached at: Home number on file [phone] (home)    Best Time: Morning    Can we leave a detailed message on this number? NO-does not voicemail. Send letter if unable to contact pt.    Call taken on 12/30/2021 at 1:38 PM by Ally Hernandez

## 2021-12-31 NOTE — TELEPHONE ENCOUNTER
Spoke to pt and she said she injured her R foot many years ago and has never fully healed. Foot pain has gotten worse in the last year or so making it difficult for her to walk long distances. She said she has also had a lot of issues with her sinuses and gets SOB with walking long distances.     Can mail out to patient once complete

## 2022-01-18 VITALS — SYSTOLIC BLOOD PRESSURE: 136 MMHG | HEART RATE: 82 BPM | DIASTOLIC BLOOD PRESSURE: 58 MMHG

## 2022-01-18 VITALS
TEMPERATURE: 98 F | HEART RATE: 86 BPM | HEART RATE: 89 BPM | DIASTOLIC BLOOD PRESSURE: 64 MMHG | RESPIRATION RATE: 20 BRPM | BODY MASS INDEX: 45.91 KG/M2 | DIASTOLIC BLOOD PRESSURE: 61 MMHG | WEIGHT: 249.8 LBS | SYSTOLIC BLOOD PRESSURE: 149 MMHG | WEIGHT: 247 LBS | BODY MASS INDEX: 46.43 KG/M2 | RESPIRATION RATE: 20 BRPM | SYSTOLIC BLOOD PRESSURE: 148 MMHG

## 2022-01-18 VITALS
SYSTOLIC BLOOD PRESSURE: 131 MMHG | HEART RATE: 79 BPM | RESPIRATION RATE: 16 BRPM | DIASTOLIC BLOOD PRESSURE: 70 MMHG | TEMPERATURE: 98.2 F | WEIGHT: 242 LBS | BODY MASS INDEX: 44.99 KG/M2

## 2022-01-18 VITALS — HEART RATE: 93 BPM | SYSTOLIC BLOOD PRESSURE: 134 MMHG | DIASTOLIC BLOOD PRESSURE: 60 MMHG

## 2022-01-18 VITALS — SYSTOLIC BLOOD PRESSURE: 129 MMHG | DIASTOLIC BLOOD PRESSURE: 65 MMHG | HEART RATE: 86 BPM

## 2022-01-18 VITALS
WEIGHT: 243 LBS | SYSTOLIC BLOOD PRESSURE: 181 MMHG | BODY MASS INDEX: 44.72 KG/M2 | TEMPERATURE: 96.8 F | DIASTOLIC BLOOD PRESSURE: 72 MMHG | HEIGHT: 62 IN | HEART RATE: 96 BPM

## 2022-01-18 VITALS — HEART RATE: 102 BPM | SYSTOLIC BLOOD PRESSURE: 134 MMHG | DIASTOLIC BLOOD PRESSURE: 69 MMHG

## 2022-02-15 ENCOUNTER — TELEPHONE (OUTPATIENT)
Dept: FAMILY MEDICINE | Facility: CLINIC | Age: 67
End: 2022-02-15
Payer: MEDICARE

## 2022-02-15 NOTE — TELEPHONE ENCOUNTER
Received today from [employer] paper work for te [employer]V    Please call sesar for  when done.    Paper work given to Mihaela

## 2022-02-25 NOTE — TELEPHONE ENCOUNTER
Informed patient that form is complete. Patient wanted the form mailed to her. Form placed in mail, copy sent to scan

## 2022-02-25 NOTE — TELEPHONE ENCOUNTER
Patient calling to check on the handicap sticker form. Pt states she hasnt heard from anyone. Pt would like if provider or provider's nurse reach back out to her.

## 2022-03-07 NOTE — TELEPHONE ENCOUNTER
Patient stopped in to say she never received this form yet and brought in a new one for us to fill out asap, she would like a call when it is completed and she will  the form from our office. I put in out guide and put on Marisa COLBERT's desk.

## 2022-03-08 NOTE — TELEPHONE ENCOUNTER
Patient calling to follow up if form is ready. Pt states she doesn't have a voicemail machine so if we try to call her, we cant leave a message. Pt is going to go do some errands and will call back again to check. She would like to  form soon today.

## 2022-03-09 ENCOUNTER — TELEPHONE (OUTPATIENT)
Dept: FAMILY MEDICINE | Facility: CLINIC | Age: 67
End: 2022-03-09
Payer: MEDICARE

## 2022-03-09 NOTE — TELEPHONE ENCOUNTER
DMV needs more information regarding why patient needs disability parking certificate.     Patient states that the form needs to state that she has pronation due to a foot injury.     Patient is ok to wait for [facility] to return to fill out form.     Form placed in [facility] in-basket for review

## 2022-03-09 NOTE — TELEPHONE ENCOUNTER
Patient dropped off DMV form to be filled out and stated she filled out the medical area and the doctor should have filled it out not her. Wants this re-filled out. Per patient please call her when she can pick it up right away.

## 2022-03-24 NOTE — TELEPHONE ENCOUNTER
Pt stopped in today to see if form is complete. Pt would like to  form once complete. Form is on your desk

## 2022-04-06 DIAGNOSIS — I10 ESSENTIAL HYPERTENSION: ICD-10-CM

## 2022-04-07 RX ORDER — LOSARTAN POTASSIUM 50 MG/1
100 TABLET ORAL DAILY
Qty: 180 TABLET | Refills: 3 | Status: SHIPPED | OUTPATIENT
Start: 2022-04-07 | End: 2023-03-31

## 2022-04-11 ENCOUNTER — OFFICE VISIT (OUTPATIENT)
Dept: FAMILY MEDICINE | Facility: CLINIC | Age: 67
End: 2022-04-11
Payer: MEDICARE

## 2022-04-11 VITALS
WEIGHT: 256.6 LBS | SYSTOLIC BLOOD PRESSURE: 139 MMHG | DIASTOLIC BLOOD PRESSURE: 66 MMHG | BODY MASS INDEX: 47.7 KG/M2 | RESPIRATION RATE: 20 BRPM | HEART RATE: 116 BPM

## 2022-04-11 DIAGNOSIS — Z12.11 SCREEN FOR COLON CANCER: ICD-10-CM

## 2022-04-11 DIAGNOSIS — F20.9 SCHIZOPHRENIA, CHRONIC CONDITION (H): ICD-10-CM

## 2022-04-11 DIAGNOSIS — Z13.220 SCREENING FOR HYPERLIPIDEMIA: ICD-10-CM

## 2022-04-11 DIAGNOSIS — I10 ESSENTIAL HYPERTENSION: ICD-10-CM

## 2022-04-11 DIAGNOSIS — E66.01 MORBID OBESITY (H): ICD-10-CM

## 2022-04-11 DIAGNOSIS — E11.42 TYPE 2 DIABETES MELLITUS WITH DIABETIC POLYNEUROPATHY, WITHOUT LONG-TERM CURRENT USE OF INSULIN (H): Primary | ICD-10-CM

## 2022-04-11 DIAGNOSIS — Z12.31 VISIT FOR SCREENING MAMMOGRAM: ICD-10-CM

## 2022-04-11 PROCEDURE — 99214 OFFICE O/P EST MOD 30 MIN: CPT | Performed by: FAMILY MEDICINE

## 2022-04-11 NOTE — PROGRESS NOTES
Assessment & Plan     Type 2 diabetes mellitus with diabetic polyneuropathy, without long-term current use of insulin (H)  Does not want to start Metformin yet.  Follow-up in 6 months for recheck.  - Adult Eye Referral    Morbid obesity (H)  Encourage diet and exercise.  Follow-up in 6 months.    Screen for colon cancer  Declines colonoscopy screening    Visit for screening mammogram  - MA SCREENING DIGITAL BILAT - Future  (s+30)    Screening for hyperlipidemia  Lipid in August 2021.  On pravastatin.    Schizophrenia, chronic condition (H)  Seeing psychiatry at [facility].  On Risperdal.  Stable.    Essential hypertension  Well-controlled.        Return in about 6 months (around 10/11/2022) for Routine preventive, with me, in person.    Xenia Patel MD  [facility]    Carlos Navarrete is a 66 year old who presents for the following health issues     History of Present Illness       Diabetes:   She presents for follow up of diabetes.  She is not checking blood glucose. She has no concerns regarding her diabetes at this time.  She is having weight gain. The patient has not had a diabetic eye exam in the last 12 months.         She eats 2-3 servings of fruits and vegetables daily.She consumes 0 sweetened beverage(s) daily.She exercises with enough effort to increase her heart rate 9 or less minutes per day.  She exercises with enough effort to increase her heart rate 3 or less days per week.   She is taking medications regularly.     Chief Complaint   Patient presents with     Diabetes     Hgb A1c 6.3 on 4/5/22     Not walking much. Has gained some weight. Trying to watch what she's eating.   Does not want to start metformin right now yet.  Doesn't want to do colonoscopy. Doesn't want to do bone density scan.   Doesn't want to do eye exam just yet.   Doesn't want pneumonia or shingles vaccine.    Review of Systems   Constitutional, HEENT, cardiovascular, pulmonary, gi and  gu systems are negative, except as otherwise noted.      Objective    /66 (BP Location: Left arm, Patient Position: Sitting, Cuff Size: Adult Large)   Pulse 116   Resp 20   Wt 116.4 kg (256 lb 9.6 oz)   BMI 47.70 kg/m    Body mass index is 47.7 kg/m .  Physical Exam   GENERAL: healthy, alert and no distress  NECK: no adenopathy, no asymmetry, masses, or scars and thyroid normal to palpation  RESP: lungs clear to auscultation - no rales, rhonchi or wheezes  CV: regular rate and rhythm, normal S1 S2, no S3 or S4, no murmur, click or rub, no peripheral edema and peripheral pulses strong  ABDOMEN: soft, nontender, no hepatosplenomegaly, no masses and bowel sounds normal  MS: no gross musculoskeletal defects noted, no edema  FEET:  MF TESTING: NL              SKIN EXAM:  NL              VASCULAR:   R DP  PULSE: +                                      L DP  PULSE: +                                      R PT  PULSE: +                                      L PT  PULSE: +  NEURO: Normal strength and tone, mentation intact and speech normal  PSYCH: mentation appears normal, affect normal/bright

## 2022-04-29 ENCOUNTER — TELEPHONE (OUTPATIENT)
Dept: FAMILY MEDICINE | Facility: CLINIC | Age: 67
End: 2022-04-29
Payer: MEDICARE

## 2022-04-29 NOTE — TELEPHONE ENCOUNTER
Reason for Call:  Other call back    Detailed comments: Patient called and she would like to either speak with a nurse or see if she can get in today we made an apt for Monday for her arm that she got the COVID shot in; in November it is still inflamed and sore please advise thank you     Phone Number Patient can be reached at: Home number on file [phone] (home)    Best Time: anytime    Can we leave a detailed message on this number? YES    Call taken on 4/29/2022 at 8:12 AM by Margaret Tinajero

## 2022-05-02 ENCOUNTER — OFFICE VISIT (OUTPATIENT)
Dept: FAMILY MEDICINE | Facility: CLINIC | Age: 67
End: 2022-05-02
Payer: MEDICARE

## 2022-05-02 VITALS
OXYGEN SATURATION: 95 % | RESPIRATION RATE: 20 BRPM | DIASTOLIC BLOOD PRESSURE: 77 MMHG | HEART RATE: 94 BPM | BODY MASS INDEX: 47.92 KG/M2 | WEIGHT: 257.8 LBS | SYSTOLIC BLOOD PRESSURE: 163 MMHG

## 2022-05-02 DIAGNOSIS — Z12.11 SCREEN FOR COLON CANCER: ICD-10-CM

## 2022-05-02 DIAGNOSIS — M79.622 PAIN OF LEFT UPPER ARM: Primary | ICD-10-CM

## 2022-05-02 DIAGNOSIS — J34.89 CHRONIC NASAL DISCHARGE: ICD-10-CM

## 2022-05-02 PROCEDURE — 99213 OFFICE O/P EST LOW 20 MIN: CPT | Performed by: FAMILY MEDICINE

## 2022-05-02 RX ORDER — AZELASTINE 1 MG/ML
1 SPRAY, METERED NASAL 2 TIMES DAILY
Qty: 30 ML | Refills: 1 | Status: SHIPPED | OUTPATIENT
Start: 2022-05-02

## 2022-05-02 NOTE — PROGRESS NOTES
Assessment & Plan     Pain of left upper arm  Refuses XR. Wants to wait and see if it goes away. Does have reduced ROM so I think this is rotator cuff tendonitis. She doesn't want to do PT.     Chronic nasal discharge  Does want to see ENT.   - azelastine (ASTELIN) 0.1 % nasal spray  Dispense: 30 mL; Refill: 1    Screen for colon cancer  Declines colonoscopy      Return in about 22 weeks (around 10/3/2022) for Routine preventive, with me, in person.    Xenia Patel MD  [facility]    Carlos Navarrete is a 66 year old who presents for the following health issues     HPI   Chief Complaint   Patient presents with     Arm Pain     Left arm sore after 2nd COVID vaccine, painful to lift arm x 6 months     2nd covid shot was 11/23/2021.   Since then has been having left upper arm pain.   She complains of ongoing nasal discharge.  She's getting her 1st booster in may.   She doesn't want XR of her left shoulder. She thinks it's from the covid shot. She sleeps on left arm so she also thinks it's from that. Pain is a burning sensation.     Review of Systems   Constitutional, HEENT, cardiovascular, pulmonary, gi and gu systems are negative, except as otherwise noted.      Objective    BP (!) 163/77 (BP Location: Right arm, Patient Position: Sitting, Cuff Size: Adult Large)   Pulse 94   Resp 20   Wt 116.9 kg (257 lb 12.8 oz)   SpO2 95%   BMI 47.92 kg/m    Body mass index is 47.92 kg/m .  Physical Exam   GENERAL: healthy, alert and no distress  MS: no gross musculoskeletal defects noted, no edema  Neck: Cervical spine non-tender to palpation. Full ROM. Negative Spurlings.  Left Shoulder: skin without erythema, swelling or ecchymosis. No atrophy noted. Non-tender to palpation over the SC joint, clavicle, AC joint, or bicipital groove. Decreased ROM - lateral abduction is decreased. Rotator cuff strength testing 5/5 bilaterally (abduction, external and internal rotation). Negative  Neer's. No pain with cross body adduction. No winging of scapula or abnormal scapular movements noted.   NEURO: Normal strength and tone, mentation intact and speech normal  PSYCH: mentation appears normal, affect normal/bright

## 2022-05-23 ENCOUNTER — ANCILLARY PROCEDURE (OUTPATIENT)
Dept: MAMMOGRAPHY | Facility: CLINIC | Age: 67
End: 2022-05-23
Attending: FAMILY MEDICINE
Payer: MEDICARE

## 2022-05-23 DIAGNOSIS — Z12.31 VISIT FOR SCREENING MAMMOGRAM: ICD-10-CM

## 2022-05-23 PROCEDURE — 77067 SCR MAMMO BI INCL CAD: CPT

## 2022-06-01 ENCOUNTER — ANCILLARY PROCEDURE (OUTPATIENT)
Dept: MAMMOGRAPHY | Facility: CLINIC | Age: 67
End: 2022-06-01
Attending: FAMILY MEDICINE
Payer: MEDICARE

## 2022-06-01 DIAGNOSIS — N63.0 BREAST MASS: ICD-10-CM

## 2022-06-01 PROCEDURE — 76642 ULTRASOUND BREAST LIMITED: CPT | Mod: LT

## 2022-07-01 ENCOUNTER — NURSE TRIAGE (OUTPATIENT)
Dept: NURSING | Facility: CLINIC | Age: 67
End: 2022-07-01

## 2022-07-01 NOTE — TELEPHONE ENCOUNTER
Thank you for the note.  As we discussed, the main concern with hernia would be incarcerated bowel or a strangulated hernia.  That would be where bowel may push through the opening and get constricted.  Given those concerns I do recommend that she present to the emergency department or possibly [facility] as they can do imaging there such as a CT scan if needed.    In this situation, clinic appointment would not be appropriate as we would not be able to do imaging here in the clinic.

## 2022-07-01 NOTE — TELEPHONE ENCOUNTER
Called and spoke with patient. Advised that she be seen in either the ED or at the [facility] as they have the availability to perform stat imaging on site, if indicated. I explained to the patient the concern for bowel incarceration with the darkening of the area and the risks associated with an untreated incarceration, such as tissue death and severe infection. Patient stated understanding and is agreeable to the plan. Patient states that she will get ready and go in to be seen.    Margaret Olson RN

## 2022-07-01 NOTE — TELEPHONE ENCOUNTER
Patient was going to the bathroom and hernia located in stomach by belly button has a bump.  Today hernia is getting bigger and is more red and a little bit of darkness/black to it.  Patient feels that it is gong to explode if she pushes too hard having a bowel movement.   Patient denies fainting.  Patient denies severe abdominal pain.  Denies vomiting red blood or black material.  Denies tarry bowel movements.  Denies constant abdominal pain.  Denies Jaundice.  Denies fever cough and shortness of breath.  Patient is requesting to be seen in clinic to have her hernia looked at as she has noticed a change.  Clinic please phone Rosalba as soon as possible.    Nurse Triage SBAR    Is this a 2nd Level Triage? YES, LICENSED PRACTITIONER REVIEW IS REQUIRED    Situation:   Hernia/abdominal pain    Background:   patient has abdominal hernia and has been seen but noticed a change in her hernia.  Hernia is getting bigger and is more red and noticed a darkness to it.    Assessment:   Patient currently denies severe pain.  Denies bleeding in stool and vomiting.  Patient is concerned about the change.    Protocol Recommended Disposition:   See Today In Office    Recommendation:   Clinic please phone patient as soon as possible.     Routed to provider    Does the patient meet one of the following criteria for ADS visit consideration? 16+ years old, with an [facility] PCP     TIP  Providers, please consider if this condition is appropriate for management at one of our Acute and Diagnostic Services sites.     If patient is a good candidate, please use dotphrase <dot>triageresponse and select Refer to ADS to document.    COVID 19 Nurse Triage Plan/Patient Instructions    Please be aware that novel coronavirus (COVID-19) may be circulating in the community. If you develop symptoms such as fever, cough, or SOB or if you have concerns about the presence of another infection including coronavirus (COVID-19), please contact your health care  provider or visit [email or web address].     Disposition/Instructions    In-Person Visit with provider recommended. Reference Visit Selection Guide.    Thank you for taking steps to prevent the spread of this virus.  o Limit your contact with others.  o Wear a simple mask to cover your cough.  o Wash your hands well and often.    Resources    [facility]: About COVID-19: [email or web address]    CDC: What to Do If You're Sick: www.cdc.gov/coronavirus/2019-ncov/about/steps-when-sick.html    CDC: Ending Home Isolation: www.cdc.gov/coronavirus/2019-ncov/hcp/disposition-in-home-patients.html     CDC: Caring for Someone: www.cdc.gov/coronavirus/2019-ncov/if-you-are-sick/care-for-someone.html     [facility]: Interim Guidance for Hospital Discharge to Home: www.[email or web address]/diseases/coronavirus/hcp/hospdischarge.pdf    [facility] clinical trials (COVID-19 research studies): clinicalaffairs.[facility]-clinical-trials     Below are the COVID-19 hotlines at the [place] Department of [facility] ([facility]). Interpreters are available.   o For health questions: Call 124-403-4885 or 1-331.972.1606 (7 a.m. to 7 p.m.)  o For questions about schools and childcare: Call 386-906-5693 or 1-669.694.9691 (7 a.m. to 7 p.m.)                       Reason for Disposition    Patient wants to be seen    Additional Information    Negative: Passed out (i.e., fainted, collapsed and was not responding)    Negative: Shock suspected (e.g., cold/pale/clammy skin, too weak to stand, low BP, rapid pulse)    Negative: Sounds like a life-threatening emergency to the triager    Negative: SEVERE abdominal pain (e.g., excruciating)    Negative: Vomiting red blood or black (coffee ground) material    Negative: Bloody, black, or tarry bowel movements (Exception: chronic-unchanged black-grey bowel movements and is taking iron pills or Pepto-bismol)    Negative: Constant abdominal pain lasting > 2 hours    Negative: Vomiting bile  (green color)    Negative: Patient sounds very sick or weak to the triager    Negative: Vomiting and abdomen looks much more swollen than usual    Negative: White of the eyes have turned yellow (i.e., jaundice)    Negative: Blood in urine (red, pink, or tea-colored)    Negative: Fever > 103 F (39.4 C)    Negative: Fever > 101 F (38.3 C) and over 60 years of age    Negative: Fever > 100.0 F (37.8 C) and has diabetes mellitus or a weak immune system (e.g., HIV positive, cancer chemotherapy, organ transplant, splenectomy, chronic steroids)    Negative: Fever > 100.0 F (37.8 C) and bedridden (e.g., nursing home patient, stroke, chronic illness, recovering from surgery)    Negative: Pregnant or could be pregnant (i.e., missed last menstrual period)    Negative: MODERATE OR MILD pain that comes and goes (cramps) lasts > 24 hours    Negative: Unusual vaginal discharge    Negative: Age > 60 years    Protocols used: ABDOMINAL PAIN - FEMALE-A-OH

## 2022-07-15 ENCOUNTER — OFFICE VISIT (OUTPATIENT)
Dept: FAMILY MEDICINE | Facility: CLINIC | Age: 67
End: 2022-07-15
Payer: MEDICARE

## 2022-07-15 VITALS
RESPIRATION RATE: 16 BRPM | DIASTOLIC BLOOD PRESSURE: 65 MMHG | HEART RATE: 99 BPM | SYSTOLIC BLOOD PRESSURE: 149 MMHG | BODY MASS INDEX: 47.96 KG/M2 | OXYGEN SATURATION: 94 % | TEMPERATURE: 98.4 F | WEIGHT: 258 LBS

## 2022-07-15 DIAGNOSIS — K42.9 PERIUMBILICAL HERNIA: Primary | ICD-10-CM

## 2022-07-15 PROCEDURE — 99213 OFFICE O/P EST LOW 20 MIN: CPT | Performed by: FAMILY MEDICINE

## 2022-07-15 NOTE — PROGRESS NOTES
Assessment & Plan     Periumbilical hernia  Reviewed UR visit. Will send to gen surg.   - Adult General Surg Referral        Return in about 4 months (around 11/15/2022) for Routine preventive, with me, in person.    Xenia Patel MD  [facility]    Carlos Navarrete is a 66 year old, presenting for the following health issues:  RECHECK (Went to the Urgency room on the 4th and is here for a follow up and discuss further treatment for hernia. )      HPI   Chief Complaint   Patient presents with     RECHECK     Went to the Urgency room on the 4th and is here for a follow up and discuss further treatment for hernia.      Reviewed care everywhere records.  She went to the urgency room 7/1/2022 for further evaluation of an abdominal bulge that she noticed about 15 years ago and a few days prior to emergency room visit she noticed that the bulge seem to be larger than usual in the mirror and she is concerned that it is growing.  Did not cause her any symptomatic pain.  She denied fever abdominal pain vomiting diarrhea constipation.  Per care everywhere record, abdominal examination showed periumbilical hernia that is soft and nontender to palpation that was easily reduced but then herniated back out when using abdominal muscles.  She also had an erythematous rash inferior to the hernia where it rubbed against her abdomen.  She was then prescribed topical antifungal nystatin.  No imaging was indicated.        Review of Systems   Constitutional, HEENT, cardiovascular, pulmonary, gi and gu systems are negative, except as otherwise noted.      Objective    BP (!) 149/65   Pulse 99   Temp 98.4  F (36.9  C) (Oral)   Resp 16   Wt 117 kg (258 lb)   SpO2 94%   BMI 47.96 kg/m    Body mass index is 47.96 kg/m .  Physical Exam   GENERAL: healthy, alert and no distress  ABDOMEN: soft, nontender, periumbilical hernia appreciated.   MS: no gross musculoskeletal defects noted, no  edema  NEURO: Normal strength and tone, mentation intact and speech normal  PSYCH: mentation appears normal, affect normal/bright                    .  ..

## 2022-07-27 ENCOUNTER — TRANSFERRED RECORDS (OUTPATIENT)
Dept: HEALTH INFORMATION MANAGEMENT | Facility: CLINIC | Age: 67
End: 2022-07-27

## 2022-07-27 LAB — RETINOPATHY: NEGATIVE

## 2022-08-03 ENCOUNTER — HOSPITAL ENCOUNTER (OUTPATIENT)
Facility: HOSPITAL | Age: 67
End: 2022-08-03
Attending: SURGERY | Admitting: SURGERY
Payer: MEDICARE

## 2022-08-03 ENCOUNTER — OFFICE VISIT (OUTPATIENT)
Dept: SURGERY | Facility: CLINIC | Age: 67
End: 2022-08-03
Attending: FAMILY MEDICINE
Payer: MEDICARE

## 2022-08-03 VITALS
HEIGHT: 62 IN | DIASTOLIC BLOOD PRESSURE: 78 MMHG | BODY MASS INDEX: 47.9 KG/M2 | SYSTOLIC BLOOD PRESSURE: 146 MMHG | WEIGHT: 260.3 LBS

## 2022-08-03 DIAGNOSIS — K42.9 PERIUMBILICAL HERNIA: ICD-10-CM

## 2022-08-03 PROCEDURE — 99204 OFFICE O/P NEW MOD 45 MIN: CPT | Performed by: SURGERY

## 2022-08-03 RX ORDER — ACETAMINOPHEN 325 MG/1
975 TABLET ORAL ONCE
Status: CANCELLED | OUTPATIENT
Start: 2022-08-03 | End: 2022-08-03

## 2022-08-03 RX ORDER — LATANOPROST 50 UG/ML
SOLUTION/ DROPS OPHTHALMIC
COMMUNITY
Start: 2022-07-28

## 2022-08-03 NOTE — LETTER
8/3/2022         RE: Rosalba Bernal  [address]  [place] [address]        Dear Colleague,    Thank you for referring your patient, Rosalba Bernal, to the [facility] [place]. Please see a copy of my visit note below.    GENERAL SURGICAL CONSULTATION    I was requested by Xenia Patel to consult on this pt to evaluate them for a ventral hernia    HPI:  This is a 66 year old female here today with a swelling at the umbilicus for the that last 20 + years. She has noticed it getting bigger.  She also has a new swelling developing above the umbilicus.     Allergies:Cat dander [animal dander]    Past Medical History:   Diagnosis Date     Diabetes mellitus (H)        Past Surgical History:   Procedure Laterality Date     HC REMOVAL OF TONSILS,<13 Y/O      Description: Tonsillectomy;  Recorded: 11/19/2007;     HYSTERECTOMY      when she was 27 years old.      [facility] TOTAL ABDOM HYSTERECTOMY      Description: Total Abdominal Hysterectomy;  Recorded: 11/19/2007;  Comments: no oophorectomy       CURRENT MEDS:  Current Outpatient Medications   Medication Sig Dispense Refill     azelastine (ASTELIN) 0.1 % nasal spray Spray 1 spray into both nostrils 2 times daily 30 mL 1     diphenhydrAMINE (BENADRYL) 25 mg capsule [DIPHENHYDRAMINE (BENADRYL) 25 MG CAPSULE] Take 25 mg by mouth at bedtime.              fexofenadine (ALLEGRA) 60 MG tablet [FEXOFENADINE (ALLEGRA) 60 MG TABLET] Take 60 mg by mouth daily.       fish oil-omega-3 fatty acids 1000 MG capsule Take 2 capsules by mouth       hydrochlorothiazide (HYDRODIURIL) 25 MG tablet [HYDROCHLOROTHIAZIDE (HYDRODIURIL) 25 MG TABLET] Take 1 tablet (25 mg) by mouth ONCE daily. 90 tablet 3     ibuprofen (ADVIL) 200 MG tablet [IBUPROFEN (ADVIL) 200 MG TABLET] Take 200 mg by mouth every 6 (six) hours as needed for pain.       latanoprost (XALATAN) 0.005 % ophthalmic solution instill 1 drop into Both Eyes every evening    "    losartan (COZAAR) 50 MG tablet Take 2 tablets (100 mg) by mouth daily 180 tablet 3     MEDICATION CANNOT BE REORDERED - PLEASE MANUALLY REORDER AND DISCONTINUE THE OLD ORDER [OMEGA-3 FATTY ACIDS-VITAMIN E (FISH OIL) 1,000 MG CAP] Take 2 capsules by mouth.              naproxen sodium (ALEVE) 220 MG tablet [NAPROXEN SODIUM (ALEVE) 220 MG TABLET] Take 220 mg by mouth 2 (two) times a day with meals.       pravastatin (PRAVACHOL) 40 MG tablet [PRAVASTATIN (PRAVACHOL) 40 MG TABLET] Take 1 tablet (40 mg) by mouth every evening. 90 tablet 3     risperiDONE (RISPERDAL) 4 MG tablet Take 4 mg by mouth daily          Family History   Problem Relation Age of Onset     Cancer Mother         pancreas     Diabetes Father      Hypertension Father      Dementia Father      Breast Cancer Cousin         in her 40 s ???     Asthma Brother      Family history is not pertinent to this patients Chief Complaint.     reports that she has quit smoking. She has a 28.00 pack-year smoking history. She has never used smokeless tobacco. She reports current alcohol use. She reports previous drug use.    Review of Systems -   10 point Review of systems is negative except for; as mentioned above in HPI and PMHx    BP (!) 146/78   Ht 1.562 m (5' 1.5\")   Wt 118.1 kg (260 lb 4.8 oz)   BMI 48.39 kg/m    Body mass index is 48.39 kg/m .    EXAM:  GENERAL: Morbidly Obese Female  HEENT: EOMI, Anicteric Sclera, Moist Mucous Membranes,  In Mouth the pt does not have redness or bleeding gums  CARDIOVASCULAR: RRR w/out murmur   CHEST/LUNG: Clear to Auscultation  ABDOMEN:  Large umbilical hernia,   MUSCULOSKELETAL:  No deformities with good range of motion in all extremities  NEURO: She is ambulatory with good strength in both legs.  HEME/LYMPH: No Cervical Adenopathy or tenderness.     IMAGES:  none    Assessment/Plan:  66 yr old woman with an umbilical hernia and possibly another ventral hernia as well.    I asked her about weight loss surgery but she " was clear that she was not interested in that.      I would recommend a Robotic Ventral Hernia Repair.      Alfredo Stewart MD  [facility]  [phone]      Again, thank you for allowing me to participate in the care of your patient.        Sincerely,        Alfredo Stewart MD

## 2022-08-03 NOTE — PROGRESS NOTES
GENERAL SURGICAL CONSULTATION    I was requested by Xenia Patel to consult on this pt to evaluate them for a ventral hernia    HPI:  This is a 66 year old female here today with a swelling at the umbilicus for the that last 20 + years. She has noticed it getting bigger.  She also has a new swelling developing above the umbilicus.     Allergies:Cat dander [animal dander]    Past Medical History:   Diagnosis Date     Diabetes mellitus (H)        Past Surgical History:   Procedure Laterality Date     HC REMOVAL OF TONSILS,<12 Y/O      Description: Tonsillectomy;  Recorded: 11/19/2007;     HYSTERECTOMY      when she was 27 years old.      [facility] TOTAL ABDOM HYSTERECTOMY      Description: Total Abdominal Hysterectomy;  Recorded: 11/19/2007;  Comments: no oophorectomy       CURRENT MEDS:  Current Outpatient Medications   Medication Sig Dispense Refill     azelastine (ASTELIN) 0.1 % nasal spray Spray 1 spray into both nostrils 2 times daily 30 mL 1     diphenhydrAMINE (BENADRYL) 25 mg capsule [DIPHENHYDRAMINE (BENADRYL) 25 MG CAPSULE] Take 25 mg by mouth at bedtime.              fexofenadine (ALLEGRA) 60 MG tablet [FEXOFENADINE (ALLEGRA) 60 MG TABLET] Take 60 mg by mouth daily.       fish oil-omega-3 fatty acids 1000 MG capsule Take 2 capsules by mouth       hydrochlorothiazide (HYDRODIURIL) 25 MG tablet [HYDROCHLOROTHIAZIDE (HYDRODIURIL) 25 MG TABLET] Take 1 tablet (25 mg) by mouth ONCE daily. 90 tablet 3     ibuprofen (ADVIL) 200 MG tablet [IBUPROFEN (ADVIL) 200 MG TABLET] Take 200 mg by mouth every 6 (six) hours as needed for pain.       latanoprost (XALATAN) 0.005 % ophthalmic solution instill 1 drop into Both Eyes every evening       losartan (COZAAR) 50 MG tablet Take 2 tablets (100 mg) by mouth daily 180 tablet 3     MEDICATION CANNOT BE REORDERED - PLEASE MANUALLY REORDER AND DISCONTINUE THE OLD ORDER [OMEGA-3 FATTY ACIDS-VITAMIN E (FISH OIL) 1,000 MG CAP] Take 2 capsules by mouth.              naproxen sodium  "(ALEVE) 220 MG tablet [NAPROXEN SODIUM (ALEVE) 220 MG TABLET] Take 220 mg by mouth 2 (two) times a day with meals.       pravastatin (PRAVACHOL) 40 MG tablet [PRAVASTATIN (PRAVACHOL) 40 MG TABLET] Take 1 tablet (40 mg) by mouth every evening. 90 tablet 3     risperiDONE (RISPERDAL) 4 MG tablet Take 4 mg by mouth daily          Family History   Problem Relation Age of Onset     Cancer Mother         pancreas     Diabetes Father      Hypertension Father      Dementia Father      Breast Cancer Cousin         in her 40 s ???     Asthma Brother      Family history is not pertinent to this patients Chief Complaint.     reports that she has quit smoking. She has a 28.00 pack-year smoking history. She has never used smokeless tobacco. She reports current alcohol use. She reports previous drug use.    Review of Systems -   10 point Review of systems is negative except for; as mentioned above in HPI and PMHx    BP (!) 146/78   Ht 1.562 m (5' 1.5\")   Wt 118.1 kg (260 lb 4.8 oz)   BMI 48.39 kg/m    Body mass index is 48.39 kg/m .    EXAM:  GENERAL: Morbidly Obese Female  HEENT: EOMI, Anicteric Sclera, Moist Mucous Membranes,  In Mouth the pt does not have redness or bleeding gums  CARDIOVASCULAR: RRR w/out murmur   CHEST/LUNG: Clear to Auscultation  ABDOMEN:  Large umbilical hernia,   MUSCULOSKELETAL:  No deformities with good range of motion in all extremities  NEURO: She is ambulatory with good strength in both legs.  HEME/LYMPH: No Cervical Adenopathy or tenderness.     IMAGES:  none    Assessment/Plan:  66 yr old woman with an umbilical hernia and possibly another ventral hernia as well.    I asked her about weight loss surgery but she was clear that she was not interested in that.      I would recommend a Robotic Ventral Hernia Repair.      Alfredo Stewart MD  [facility]  [phone]  "

## 2022-08-11 ENCOUNTER — TELEPHONE (OUTPATIENT)
Dept: FAMILY MEDICINE | Facility: CLINIC | Age: 67
End: 2022-08-11

## 2022-08-11 DIAGNOSIS — E78.00 HYPERCHOLESTEREMIA: Primary | ICD-10-CM

## 2022-08-11 NOTE — TELEPHONE ENCOUNTER
I don't see a recent lipid panel done within our system or [facility] (where her psychiatrist is). I  can't make recommendation until I see result. Please call pt for more info.

## 2022-08-11 NOTE — TELEPHONE ENCOUNTER
Patient calling.  She was notified that her triglycerides are elevated.  She would like to know if her pravastatin and fish oil pills can be adjusted.  Please call to advise  Ok to leave a message

## 2022-08-12 NOTE — TELEPHONE ENCOUNTER
Called patient to relay provider's message below. States labs were done on 8/9. Reviewed chart & was able to see lab results placed by Dr. Walsh. Will route to PCP to advise.    Please call patient after 1 pm today with provider's recommendation.    Caitlin Magana, RN, BSN  [facility]

## 2022-08-12 NOTE — TELEPHONE ENCOUNTER
Called patient to relay provider's message below. Patient states she was not fasting but only ate a little. Wondering if provider would like to do a repeat of the lipid panel with patient fasting? Patient also wants to know if provider would recommend patient to continue her current dosage of fish oil which is 2000 mg or decrease to 1000 mg? Routing to PCP to advise.    Patient will not be home & has no  setup. Please call back with provider's response on Monday.    Caitlin Magana RN, BSN  [facility]

## 2022-08-19 ENCOUNTER — LAB (OUTPATIENT)
Dept: LAB | Facility: CLINIC | Age: 67
End: 2022-08-19
Payer: COMMERCIAL

## 2022-08-19 DIAGNOSIS — E78.00 HYPERCHOLESTEREMIA: ICD-10-CM

## 2022-08-19 LAB
CHOLEST SERPL-MCNC: 156 MG/DL
HDLC SERPL-MCNC: 42 MG/DL
LDLC SERPL CALC-MCNC: 79 MG/DL
NONHDLC SERPL-MCNC: 114 MG/DL
TRIGL SERPL-MCNC: 173 MG/DL

## 2022-08-19 PROCEDURE — 36415 COLL VENOUS BLD VENIPUNCTURE: CPT

## 2022-08-19 PROCEDURE — 80061 LIPID PANEL: CPT

## 2022-08-22 ENCOUNTER — TELEPHONE (OUTPATIENT)
Dept: FAMILY MEDICINE | Facility: CLINIC | Age: 67
End: 2022-08-22

## 2022-08-22 DIAGNOSIS — A60.00 GENITAL HERPES SIMPLEX, UNSPECIFIED SITE: Primary | ICD-10-CM

## 2022-08-22 DIAGNOSIS — E78.00 HYPERCHOLESTEREMIA: ICD-10-CM

## 2022-08-22 RX ORDER — VALACYCLOVIR HYDROCHLORIDE 500 MG/1
500 TABLET, FILM COATED ORAL 2 TIMES DAILY
Qty: 6 TABLET | Refills: 0 | Status: SHIPPED | OUTPATIENT
Start: 2022-08-22 | End: 2022-08-29

## 2022-08-22 RX ORDER — PRAVASTATIN SODIUM 80 MG/1
80 TABLET ORAL DAILY
Qty: 90 TABLET | Refills: 1 | Status: SHIPPED | OUTPATIENT
Start: 2022-08-22 | End: 2023-03-09

## 2022-08-22 NOTE — TELEPHONE ENCOUNTER
Called and spoke with patient, relayed that PCP sent valacyclovir prescription over to [facility]. Patient stated understanding and thanked provider.    Margaret Olson RN

## 2022-08-22 NOTE — TELEPHONE ENCOUNTER
Incoming call from pt stating she has a herpes outbreak. Pt stating that it is extremely itchy. She said she has been treated for this in the past. Looks like she saw the urgency room last year and also was treated in 2015 by Dr Del Real. Please advise. Pt does not have mychart to do an evisit

## 2022-08-26 DIAGNOSIS — A60.00 GENITAL HERPES SIMPLEX, UNSPECIFIED SITE: ICD-10-CM

## 2022-08-26 NOTE — TELEPHONE ENCOUNTER
Patient calling requesting a refill on Valacyclovir.   Pending Prescriptions:                       Disp   Refills    valACYclovir (VALTREX) 500 MG tablet      6 tabl*0            Sig: Take 1 tablet (500 mg) by mouth 2 times daily

## 2022-08-27 RX ORDER — VALACYCLOVIR HYDROCHLORIDE 500 MG/1
TABLET, FILM COATED ORAL
Qty: 6 TABLET | Refills: 0 | OUTPATIENT
Start: 2022-08-27

## 2022-08-29 RX ORDER — VALACYCLOVIR HYDROCHLORIDE 500 MG/1
500 TABLET, FILM COATED ORAL 2 TIMES DAILY
Qty: 6 TABLET | Refills: 0 | Status: SHIPPED | OUTPATIENT
Start: 2022-08-29 | End: 2022-09-22

## 2022-09-19 DIAGNOSIS — A60.00 GENITAL HERPES SIMPLEX, UNSPECIFIED SITE: ICD-10-CM

## 2022-09-21 NOTE — TELEPHONE ENCOUNTER
"Routing refill request to provider for review/approval because:  Please clarify if this is a PRN script for outbreaks or a daily maintenance dosing script.  Thank you.    Last Written Prescription Date:  8/29/22  Last Fill Quantity: 6,  # refills: 0   Last office visit provider:  7/15/22     Requested Prescriptions   Pending Prescriptions Disp Refills     valACYclovir (VALTREX) 500 MG tablet [Pharmacy Med Name: valACYclovir HCl Oral Tablet 500 MG] 6 tablet 0     Sig: Take 1 tablet by mouth 2 times daily       Antivirals for Herpes Protocol Passed - 9/21/2022 10:03 AM        Passed - Patient is age 12 or older        Passed - Recent (12 mo) or future (30 days) visit within the authorizing provider's specialty     Patient has had an office visit with the authorizing provider or a provider within the authorizing providers department within the previous 12 mos or has a future within next 30 days. See \"Patient Info\" tab in inbasket, or \"Choose Columns\" in Meds & Orders section of the refill encounter.              Passed - Medication is active on med list        Passed - Normal serum creatinine on file in past 12 months     Recent Labs   Lab Test 10/01/21  1033   CR 0.95       Ok to refill medication if creatinine is low               Tushar Woodward RN 09/21/22 10:03 AM  "

## 2022-09-22 RX ORDER — VALACYCLOVIR HYDROCHLORIDE 500 MG/1
TABLET, FILM COATED ORAL
Qty: 6 TABLET | Refills: 0 | Status: SHIPPED | OUTPATIENT
Start: 2022-09-22 | End: 2022-09-30

## 2022-09-30 DIAGNOSIS — A60.00 GENITAL HERPES SIMPLEX, UNSPECIFIED SITE: ICD-10-CM

## 2022-09-30 RX ORDER — VALACYCLOVIR HYDROCHLORIDE 500 MG/1
TABLET, FILM COATED ORAL
Qty: 6 TABLET | Refills: 0 | Status: SHIPPED | OUTPATIENT
Start: 2022-09-30 | End: 2022-10-06

## 2022-09-30 NOTE — TELEPHONE ENCOUNTER
"Routing refill request to provider for review/approval because:  Please clarify script is this for breakouts or maintenance     Last Written Prescription Date:  9/22/22  Last Fill Quantity: 6,  # refills: 0   Last office visit provider:  7/15/22     Requested Prescriptions   Pending Prescriptions Disp Refills     valACYclovir (VALTREX) 500 MG tablet [Pharmacy Med Name: valACYclovir HCl Oral Tablet 500 MG] 6 tablet 0     Sig: Take 1 tablet by mouth 2 times daily       Antivirals for Herpes Protocol Passed - 9/30/2022 10:19 AM        Passed - Patient is age 12 or older        Passed - Recent (12 mo) or future (30 days) visit within the authorizing provider's specialty     Patient has had an office visit with the authorizing provider or a provider within the authorizing providers department within the previous 12 mos or has a future within next 30 days. See \"Patient Info\" tab in inbasket, or \"Choose Columns\" in Meds & Orders section of the refill encounter.              Passed - Medication is active on med list        Passed - Normal serum creatinine on file in past 12 months     Recent Labs   Lab Test 10/01/21  1033   CR 0.95       Ok to refill medication if creatinine is low               Branden Billingsley 09/30/22 1:01 PM  "

## 2022-10-06 ENCOUNTER — OFFICE VISIT (OUTPATIENT)
Dept: FAMILY MEDICINE | Facility: CLINIC | Age: 67
End: 2022-10-06
Payer: MEDICARE

## 2022-10-06 VITALS
SYSTOLIC BLOOD PRESSURE: 153 MMHG | OXYGEN SATURATION: 95 % | HEART RATE: 93 BPM | DIASTOLIC BLOOD PRESSURE: 70 MMHG | TEMPERATURE: 97.4 F | WEIGHT: 256.8 LBS | RESPIRATION RATE: 20 BRPM | BODY MASS INDEX: 47.74 KG/M2

## 2022-10-06 DIAGNOSIS — I10 ESSENTIAL HYPERTENSION: ICD-10-CM

## 2022-10-06 DIAGNOSIS — F20.9 SCHIZOPHRENIA, CHRONIC CONDITION (H): ICD-10-CM

## 2022-10-06 DIAGNOSIS — A60.00 GENITAL HERPES SIMPLEX, UNSPECIFIED SITE: ICD-10-CM

## 2022-10-06 DIAGNOSIS — E66.01 MORBID OBESITY (H): ICD-10-CM

## 2022-10-06 DIAGNOSIS — Z12.11 SCREEN FOR COLON CANCER: ICD-10-CM

## 2022-10-06 DIAGNOSIS — E11.42 TYPE 2 DIABETES MELLITUS WITH DIABETIC POLYNEUROPATHY, WITHOUT LONG-TERM CURRENT USE OF INSULIN (H): ICD-10-CM

## 2022-10-06 DIAGNOSIS — E78.00 HYPERCHOLESTEREMIA: ICD-10-CM

## 2022-10-06 DIAGNOSIS — Z00.00 ENCOUNTER FOR MEDICARE ANNUAL WELLNESS EXAM: Primary | ICD-10-CM

## 2022-10-06 LAB
ANION GAP SERPL CALCULATED.3IONS-SCNC: 12 MMOL/L (ref 7–15)
BUN SERPL-MCNC: 18 MG/DL (ref 8–23)
CALCIUM SERPL-MCNC: 9.7 MG/DL (ref 8.8–10.2)
CHLORIDE SERPL-SCNC: 100 MMOL/L (ref 98–107)
CHOLEST SERPL-MCNC: 131 MG/DL
CREAT SERPL-MCNC: 0.92 MG/DL (ref 0.51–0.95)
CREAT UR-MCNC: 32.2 MG/DL
DEPRECATED HCO3 PLAS-SCNC: 25 MMOL/L (ref 22–29)
ERYTHROCYTE [DISTWIDTH] IN BLOOD BY AUTOMATED COUNT: 13.5 % (ref 10–15)
GFR SERPL CREATININE-BSD FRML MDRD: 69 ML/MIN/1.73M2
GLUCOSE SERPL-MCNC: 115 MG/DL (ref 70–99)
HBA1C MFR BLD: 6.1 % (ref 0–5.6)
HCT VFR BLD AUTO: 41.9 % (ref 35–47)
HDLC SERPL-MCNC: 44 MG/DL
HGB BLD-MCNC: 13.9 G/DL (ref 11.7–15.7)
HOLD SPECIMEN: NORMAL
LDLC SERPL CALC-MCNC: 59 MG/DL
MCH RBC QN AUTO: 29.9 PG (ref 26.5–33)
MCHC RBC AUTO-ENTMCNC: 33.2 G/DL (ref 31.5–36.5)
MCV RBC AUTO: 90 FL (ref 78–100)
MICROALBUMIN UR-MCNC: <12 MG/L
MICROALBUMIN/CREAT UR: NORMAL MG/G{CREAT}
NONHDLC SERPL-MCNC: 87 MG/DL
PLATELET # BLD AUTO: 220 10E3/UL (ref 150–450)
POTASSIUM SERPL-SCNC: 4.5 MMOL/L (ref 3.4–5.3)
RBC # BLD AUTO: 4.65 10E6/UL (ref 3.8–5.2)
SODIUM SERPL-SCNC: 137 MMOL/L (ref 136–145)
TRIGL SERPL-MCNC: 140 MG/DL
WBC # BLD AUTO: 7 10E3/UL (ref 4–11)

## 2022-10-06 PROCEDURE — 80061 LIPID PANEL: CPT | Performed by: FAMILY MEDICINE

## 2022-10-06 PROCEDURE — 83036 HEMOGLOBIN GLYCOSYLATED A1C: CPT | Performed by: FAMILY MEDICINE

## 2022-10-06 PROCEDURE — 80048 BASIC METABOLIC PNL TOTAL CA: CPT | Performed by: FAMILY MEDICINE

## 2022-10-06 PROCEDURE — 85027 COMPLETE CBC AUTOMATED: CPT | Performed by: FAMILY MEDICINE

## 2022-10-06 PROCEDURE — 36415 COLL VENOUS BLD VENIPUNCTURE: CPT | Performed by: FAMILY MEDICINE

## 2022-10-06 PROCEDURE — G0439 PPPS, SUBSEQ VISIT: HCPCS | Performed by: FAMILY MEDICINE

## 2022-10-06 PROCEDURE — 99214 OFFICE O/P EST MOD 30 MIN: CPT | Mod: 25 | Performed by: FAMILY MEDICINE

## 2022-10-06 PROCEDURE — 82043 UR ALBUMIN QUANTITATIVE: CPT | Performed by: FAMILY MEDICINE

## 2022-10-06 RX ORDER — VALACYCLOVIR HYDROCHLORIDE 500 MG/1
500 TABLET, FILM COATED ORAL DAILY
Qty: 90 TABLET | Refills: 1 | Status: SHIPPED | OUTPATIENT
Start: 2022-10-06 | End: 2023-02-28

## 2022-10-06 RX ORDER — VALACYCLOVIR HYDROCHLORIDE 500 MG/1
500 TABLET, FILM COATED ORAL 2 TIMES DAILY
Qty: 6 TABLET | Refills: 0 | Status: CANCELLED | OUTPATIENT
Start: 2022-10-06

## 2022-10-06 ASSESSMENT — ENCOUNTER SYMPTOMS
HEARTBURN: 0
NAUSEA: 0
FREQUENCY: 1
CHILLS: 0
DIARRHEA: 0
ABDOMINAL PAIN: 0
WEAKNESS: 0
PALPITATIONS: 0
HEMATOCHEZIA: 0
HEADACHES: 1
DYSURIA: 0
PARESTHESIAS: 0
COUGH: 1
ARTHRALGIAS: 0
JOINT SWELLING: 0
NERVOUS/ANXIOUS: 0
SHORTNESS OF BREATH: 1
DIZZINESS: 1
MYALGIAS: 0
EYE PAIN: 0
SORE THROAT: 0
FEVER: 0
CONSTIPATION: 0
HEMATURIA: 0

## 2022-10-06 ASSESSMENT — ACTIVITIES OF DAILY LIVING (ADL)
CURRENT_FUNCTION: HOUSEWORK REQUIRES ASSISTANCE
CURRENT_FUNCTION: SHOPPING REQUIRES ASSISTANCE

## 2022-10-06 NOTE — PATIENT INSTRUCTIONS
Patient Education   Personalized Prevention Plan  You are due for the preventive services outlined below.  Your care team is available to assist you in scheduling these services.  If you have already completed any of these items, please share that information with your care team to update in your medical record.  Health Maintenance Due   Topic Date Due     Osteoporosis Screening  Never done     Discuss Advance Care Planning  Never done     Pneumococcal Vaccine (1 - PCV) Never done     Colorectal Cancer Screening  Never done     Zoster (Shingles) Vaccine (1 of 2) Never done     LUNG CANCER SCREENING  Never done     COVID-19 Vaccine (4 - Booster for Moderna series) 07/22/2022     Flu Vaccine (1) Never done     Basic Metabolic Panel  10/01/2022     Kidney Microalbumin Urine Test  10/01/2022     A1C Lab  10/05/2022

## 2022-10-06 NOTE — PROGRESS NOTES
"SUBJECTIVE:   Rosalba is a 66 year old who presents for Preventive Visit.      Patient has been advised of split billing requirements and indicates understanding: Yes  Are you in the first 12 months of your Medicare coverage?  Yes,  Visual Acuity:  Right Eye: 20/25   Left Eye: 20/25  Both Eyes: 20/25    Healthy Habits:     In general, how would you rate your overall health?  Good    Frequency of exercise:  None    Do you usually eat at least 4 servings of fruit and vegetables a day, include whole grains    & fiber and avoid regularly eating high fat or \"junk\" foods?  No    Taking medications regularly:  Yes    Ability to successfully perform activities of daily living:  Shopping requires assistance and housework requires assistance    Home Safety:  Throw rugs in the hallway and lack of grab bars in the bathroom    Hearing Impairment:  No hearing concerns    In the past 6 months, have you been bothered by leaking of urine?  No    In general, how would you rate your overall mental or emotional health?  Good      PHQ-2 Total Score: 0    Additional concerns today:  No     Chief Complaint   Patient presents with     Wellness Visit       Do you feel safe in your environment? Yes    Have you ever done Advance Care Planning? (For example, a Health Directive, POLST, or a discussion with a medical provider or your loved ones about your wishes): No, advance care planning information given to patient to review.  Patient plans to discuss their wishes with loved ones or provider.         Fall risk  Fallen 2 or more times in the past year?: No  Any fall with injury in the past year?: No    Cognitive Screening   1) Repeat 3 items (Leader, Season, Table)    2) Clock draw: NORMAL  3) 3 item recall: Recalls 3 objects  Results: 3 items recalled: COGNITIVE IMPAIRMENT LESS LIKELY    Mini-CogTM Copyright [employer]. Licensed by the author for use in [facility]; reprinted with permission (kyra@.[facility]). All rights reserved.  "     Do you have sleep apnea, excessive snoring or daytime drowsiness?: no    Reviewed and updated as needed this visit by clinical staff                    Reviewed and updated as needed this visit by Provider                   Social History     Tobacco Use     Smoking status: Former Smoker     Packs/day: 1.00     Years: 28.00     Pack years: 28.00     Smokeless tobacco: Never Used     Tobacco comment: quit apx 35 yrs ago   Substance Use Topics     Alcohol use: Yes     Comment: Alcoholic Drinks/day: a beer every 4 months         Alcohol Use 10/6/2022   Prescreen: >3 drinks/day or >7 drinks/week? No         Current providers sharing in care for this patient include:   Patient Care Team:  Xenia Patel MD as PCP - General (Family Medicine)  Xenia Patel MD as Assigned PCP  Alfredo Stewart MD as Assigned Surgical Provider    The following health maintenance items are reviewed in Epic and correct as of today:  Health Maintenance   Topic Date Due     DEXA  Never done     ADVANCE CARE PLANNING  Never done     Pneumococcal Vaccine: 65+ Years (1 - PCV) Never done     COLORECTAL CANCER SCREENING  Never done     ZOSTER IMMUNIZATION (1 of 2) Never done     LUNG CANCER SCREENING  Never done     MEDICARE ANNUAL WELLNESS VISIT  Never done     COVID-19 Vaccine (4 - Booster for Moderna series) 07/22/2022     INFLUENZA VACCINE (1) Never done     BMP  10/01/2022     MICROALBUMIN  10/01/2022     ANNUAL REVIEW OF HM ORDERS  10/01/2022     A1C  10/05/2022     DIABETIC FOOT EXAM  04/11/2023     EYE EXAM  07/27/2023     LIPID  08/19/2023     FALL RISK ASSESSMENT  10/06/2023     MAMMO SCREENING  05/23/2024     DTAP/TDAP/TD IMMUNIZATION (3 - Td or Tdap) 05/20/2031     HEPATITIS C SCREENING  Completed     PHQ-2 (once per calendar year)  Completed     IPV IMMUNIZATION  Aged Out     MENINGITIS IMMUNIZATION  Aged Out     HIV SCREENING  Discontinued     Lab work is in process      FHS-7:   Breast CA Risk Assessment  "(FHS-7) 5/23/2022 10/6/2022   Did any of your first-degree relatives have breast or ovarian cancer? No No   Did any of your relatives have bilateral breast cancer? No Unknown   Did any man in your family have breast cancer? No Unknown   Did any woman in your family have breast and ovarian cancer? No Unknown   Did any woman in your family have breast cancer before age 50 y? No Unknown   Do you have 2 or more relatives with breast and/or ovarian cancer? No Unknown   Do you have 2 or more relatives with breast and/or bowel cancer? No Unknown       Mammogram Screening: Recommended mammography every 1-2 years with patient discussion and risk factor consideration  Pertinent mammograms are reviewed under the imaging tab.    Review of Systems   Constitutional: Negative for chills and fever.   HENT: Negative for congestion, ear pain, hearing loss and sore throat.    Eyes: Negative for pain and visual disturbance.   Respiratory: Positive for cough and shortness of breath.    Cardiovascular: Negative for chest pain, palpitations and peripheral edema.   Gastrointestinal: Negative for abdominal pain, constipation, diarrhea, heartburn, hematochezia and nausea.   Genitourinary: Positive for frequency. Negative for dysuria, genital sores, hematuria and urgency.   Musculoskeletal: Negative for arthralgias, joint swelling and myalgias.   Skin: Negative for rash.   Neurological: Positive for dizziness and headaches. Negative for weakness and paresthesias.   Psychiatric/Behavioral: Negative for mood changes. The patient is not nervous/anxious.      Constitutional, HEENT, cardiovascular, pulmonary, gi and gu systems are negative, except as otherwise noted.    OBJECTIVE:   BP (!) 153/70   Pulse 93   Temp 97.4  F (36.3  C) (Oral)   Resp 20   Wt 116.5 kg (256 lb 12.8 oz)   SpO2 95%   BMI 47.74 kg/m   Estimated body mass index is 48.39 kg/m  as calculated from the following:    Height as of 8/3/22: 1.562 m (5' 1.5\").    Weight as of " 8/3/22: 118.1 kg (260 lb 4.8 oz).     Physical Exam  GENERAL APPEARANCE: healthy, alert and no distress  EYES: Eyes grossly normal to inspection, PERRL and conjunctivae and sclerae normal  NECK: no adenopathy, no asymmetry, masses, or scars and thyroid normal to palpation  RESP: lungs clear to auscultation - no rales, rhonchi or wheezes  CV: regular rate and rhythm, normal S1 S2, no S3 or S4, no murmur, click or rub, no peripheral edema and peripheral pulses strong  ABDOMEN: soft, nontender, protuberant  : pt declined   MS: no musculoskeletal defects are noted and gait is age appropriate without ataxia  FEET:  MF TESTING: NL              SKIN EXAM:  NL              VASCULAR:   R DP  PULSE: +                                      L DP  PULSE: +                                      R PT  PULSE: +                                      L PT  PULSE: +  SKIN: no suspicious lesions or rashes  NEURO: Normal strength and tone, sensory exam grossly normal, mentation intact and speech normal  PSYCH: mentation appears normal and affect normal/bright    Diagnostic Test Results:  Labs reviewed in Epic  Results for orders placed or performed in visit on 10/06/22 (from the past 24 hour(s))   Extra Tube    Narrative    The following orders were created for panel order Extra Tube.  Procedure                               Abnormality         Status                     ---------                               -----------         ------                     Extra Green Top (Lithium...[[id number]]                      In process                   Please view results for these tests on the individual orders.       ASSESSMENT / PLAN:   Rosalba was seen today for wellness visit.    Diagnoses and all orders for this visit:    Encounter for Medicare annual wellness exam  Routine history and physical.  Updated in EMR.  Patient declined colonoscopy and bone density and immunizations.  Follow-up in 6 months for follow-up of chronic medical  "conditions    Genital herpes simplex, unspecified site  Has had several outbreaks in the last couple of months.  She also commented that valacyclovir helps with her nasal symptoms that she has been experiencing for many years.  We will put her on suppressive herpetic infection treatment with valacyclovir 500 mg daily for the next 6 months.  Follow-up in 6 months  -     valACYclovir (VALTREX) 500 MG tablet; Take 1 tablet (500 mg) by mouth daily    Type 2 diabetes mellitus with diabetic polyneuropathy, without long-term current use of insulin (H)  A1c 6.1.  Stable similar to before.  Not on any treatment.  -     BASIC METABOLIC PANEL  -     Albumin Random Urine Quantitative with Creat Ratio; Future  -     HEMOGLOBIN A1C; Future  -     CBC with platelets  -     HEMOGLOBIN A1C    Hypercholesteremia  On pravastatin  -     Lipid Profile (Chol, Trig, HDL, LDL calc)    Schizophrenia, chronic condition (H)  Seeing psychiatry at [facility].    Screen for colon cancer  Declined colonoscopy    Morbid obesity (H)  Encouraged exercise and diet    Hypertension  Not well controlled.  However she does not want to change her antihypertensive regimen.  She will return in 2 weeks for nurse only blood pressure check and if is still 140/90 or higher would recommend adding low-dose amlodipine.    Other orders  -     REVIEW OF HEALTH MAINTENANCE PROTOCOL ORDERS  -     Extra Tube; Future  -     Extra Tube        Patient has been advised of split billing requirements and indicates understanding: Yes    COUNSELING:  Reviewed preventive health counseling, as reflected in patient instructions       Regular exercise       Healthy diet/nutrition    Estimated body mass index is 48.39 kg/m  as calculated from the following:    Height as of 8/3/22: 1.562 m (5' 1.5\").    Weight as of 8/3/22: 118.1 kg (260 lb 4.8 oz).    Weight management plan: Discussed healthy diet and exercise guidelines    She reports that she has quit smoking. She has a 28.00 " pack-year smoking history. She has never used smokeless tobacco.      Appropriate preventive services were discussed with this patient, including applicable screening as appropriate for cardiovascular disease, diabetes, osteopenia/osteoporosis, and glaucoma.  As appropriate for age/gender, discussed screening for colorectal cancer, prostate cancer, breast cancer, and cervical cancer. Checklist reviewing preventive services available has been given to the patient.    Reviewed patients plan of care and provided an AVS. The Basic Care Plan (routine screening as documented in Health Maintenance) for Rosalba meets the Care Plan requirement. This Care Plan has been established and reviewed with the Patient.    Counseling Resources:  ATP IV Guidelines  Pooled Cohorts Equation Calculator  Breast Cancer Risk Calculator  Breast Cancer: Medication to Reduce Risk  FRAX Risk Assessment  ICSI Preventive Guidelines  Dietary Guidelines for Americans, 2010  USDA's MyPlate  ASA Prophylaxis  Lung CA Screening    Xenia Patel MD  [facility]    Identified Health Risks:

## 2022-10-06 NOTE — LETTER
October 7, 2022      Rosalba PATRICK Gabe  [address]  [place] [address]        Dear ,    We are writing to inform you of your test results.    Lab all good. LDL and triglycerides are now in normal range.      Resulted Orders   BASIC METABOLIC PANEL   Result Value Ref Range    Sodium 137 136 - 145 mmol/L    Potassium 4.5 3.4 - 5.3 mmol/L    Chloride 100 98 - 107 mmol/L    Carbon Dioxide (CO2) 25 22 - 29 mmol/L    Anion Gap 12 7 - 15 mmol/L    Urea Nitrogen 18.0 8.0 - 23.0 mg/dL    Creatinine 0.92 0.51 - 0.95 mg/dL    Calcium 9.7 8.8 - 10.2 mg/dL    Glucose 115 (H) 70 - 99 mg/dL    GFR Estimate 69 >60 mL/min/1.73m2      Comment:      Effective December 21, 2021 eGFRcr in adults is calculated using the 2021 CKD-EPI creatinine equation which includes age and gender (Flaquita hardy al., [place], DOI: 10.1056/XYYIml6394764)   Lipid Profile (Chol, Trig, HDL, LDL calc)   Result Value Ref Range    Cholesterol 131 <200 mg/dL    Triglycerides 140 <150 mg/dL    Direct Measure HDL 44 (L) >=50 mg/dL    LDL Cholesterol Calculated 59 <=100 mg/dL    Non HDL Cholesterol 87 <130 mg/dL    Narrative    Cholesterol  Desirable:  <200 mg/dL    Triglycerides  Normal:  Less than 150 mg/dL  Borderline High:  150-199 mg/dL  High:  200-499 mg/dL  Very High:  Greater than or equal to 500 mg/dL    Direct Measure HDL  Female:  Greater than or equal to 50 mg/dL   Male:  Greater than or equal to 40 mg/dL    LDL Cholesterol  Desirable:  <100mg/dL  Above Desirable:  100-129 mg/dL   Borderline High:  130-159 mg/dL   High:  160-189 mg/dL   Very High:  >= 190 mg/dL    Non HDL Cholesterol  Desirable:  130 mg/dL  Above Desirable:  130-159 mg/dL  Borderline High:  160-189 mg/dL  High:  190-219 mg/dL  Very High:  Greater than or equal to 220 mg/dL   CBC with platelets   Result Value Ref Range    WBC Count 7.0 4.0 - 11.0 10e3/uL    RBC Count 4.65 3.80 - 5.20 10e6/uL    Hemoglobin 13.9 11.7 - 15.7 g/dL    Hematocrit 41.9 35.0 - 47.0 %    MCV 90 78  - 100 fL    MCH 29.9 26.5 - 33.0 pg    MCHC 33.2 31.5 - 36.5 g/dL    RDW 13.5 10.0 - 15.0 %    Platelet Count 220 150 - 450 10e3/uL   HEMOGLOBIN A1C   Result Value Ref Range    Hemoglobin A1C 6.1 (H) 0.0 - 5.6 %      Comment:      Normal <5.7%   Prediabetes 5.7-6.4%    Diabetes 6.5% or higher     Note: Adopted from ADA consensus guidelines.   Albumin Random Urine Quantitative with Creat Ratio   Result Value Ref Range    Albumin Urine mg/L <12.0 mg/L      Comment:      The reference ranges have not been established in urine albumin. The results should be integrated into the clinical context for interpretation.    Albumin Urine mg/g Cr        Comment:      Unable to calculate, urine albumin and/or urine creatinine is outside detectable limits.  Microalbuminuria is defined as an albumin:creatinine ratio of 17 to 299 for males and 25 to 299 for females. A ratio of albumin:creatinine of 300 or higher is indicative of overt proteinuria.  Due to biologic variability, positive results should be confirmed by a second, first-morning random or 24-hour timed urine specimen. If there is discrepancy, a third specimen is recommended. When 2 out of 3 results are in the microalbuminuria range, this is evidence for incipient nephropathy and warrants increased efforts at glucose control, blood pressure control, and institution of therapy with an angiotensin-converting-enzyme (ACE) inhibitor (if the patient can tolerate it).      Creatinine Urine mg/dL 32.2 mg/dL      Comment:      The reference ranges have not been established in urine creatinine. The results should be integrated into the clinical context for interpretation.       If you have any questions or concerns, please call the clinic at the number listed above.       Sincerely,      Xenia Patel MD

## 2022-10-25 ENCOUNTER — ALLIED HEALTH/NURSE VISIT (OUTPATIENT)
Dept: FAMILY MEDICINE | Facility: CLINIC | Age: 67
End: 2022-10-25
Payer: COMMERCIAL

## 2022-10-25 VITALS — HEART RATE: 94 BPM | DIASTOLIC BLOOD PRESSURE: 63 MMHG | SYSTOLIC BLOOD PRESSURE: 145 MMHG

## 2022-10-25 DIAGNOSIS — I10 ESSENTIAL HYPERTENSION: Primary | ICD-10-CM

## 2022-10-25 PROCEDURE — 99207 PR NO CHARGE NURSE ONLY: CPT

## 2022-10-25 NOTE — PROGRESS NOTES
Follow Up Blood Pressure Check    Rosalba Bernal is a 66 year old female recommended to follow up for blood pressure check by Xenia Patel. Anihypertensive medications and adherence were verified: Yes.     Reason for visit:   10/03/2022  Hypertension  Not well controlled.  However she does not want to change her antihypertensive regimen.  She will return in 2 weeks for nurse only blood pressure check and if is still 140/90 or higher would recommend adding low-dose amlodipine    Medication change at last visit: No change    Today's Vitals:   Vitals:    10/25/22 1135 10/25/22 1144 10/25/22 1146   BP: (!) 151/71 139/64 (!) 145/63   BP Location: Right arm Right arm Left arm   Patient Position: Sitting Sitting Sitting   Cuff Size: Adult Large Adult Large Adult Large   Pulse: 103 94        Home blood pressure readings brought in today:   None brought in, but she does express a concern about her blood pressures being different in right vs left arm.  She want me to make sure I documented which arm the blood pressure was done.  I offered to do her blood pressure in the other arm, which we did do.  Not much difference here in the clinic.  Please see documentation in vitals section.     Lowest blood pressure today is less than 140/90 and they deny signs or symptoms of new onset:    Marisa Gasca    Current Outpatient Medications   Medication Sig Dispense Refill     hydrochlorothiazide (HYDRODIURIL) 25 MG tablet [HYDROCHLOROTHIAZIDE (HYDRODIURIL) 25 MG TABLET] Take 1 tablet (25 mg) by mouth ONCE daily. 90 tablet 3     losartan (COZAAR) 50 MG tablet Take 2 tablets (100 mg) by mouth daily 180 tablet 3     azelastine (ASTELIN) 0.1 % nasal spray Spray 1 spray into both nostrils 2 times daily 30 mL 1     diphenhydrAMINE (BENADRYL) 25 mg capsule [DIPHENHYDRAMINE (BENADRYL) 25 MG CAPSULE] Take 25 mg by mouth at bedtime.              fexofenadine (ALLEGRA) 60 MG tablet [FEXOFENADINE (ALLEGRA) 60 MG TABLET] Take 60 mg by mouth  daily.       fish oil-omega-3 fatty acids 1000 MG capsule Take 2 capsules by mouth       ibuprofen (ADVIL) 200 MG tablet [IBUPROFEN (ADVIL) 200 MG TABLET] Take 200 mg by mouth every 6 (six) hours as needed for pain.       latanoprost (XALATAN) 0.005 % ophthalmic solution instill 1 drop into Both Eyes every evening       naproxen sodium (ALEVE) 220 MG tablet [NAPROXEN SODIUM (ALEVE) 220 MG TABLET] Take 220 mg by mouth 2 (two) times a day with meals.       pravastatin (PRAVACHOL) 80 MG tablet Take 1 tablet (80 mg) by mouth daily 90 tablet 1     risperiDONE (RISPERDAL) 4 MG tablet Take 4 mg by mouth daily        valACYclovir (VALTREX) 500 MG tablet Take 1 tablet (500 mg) by mouth daily 90 tablet 1

## 2022-11-10 ENCOUNTER — TELEPHONE (OUTPATIENT)
Dept: FAMILY MEDICINE | Facility: CLINIC | Age: 67
End: 2022-11-10

## 2022-11-10 DIAGNOSIS — I10 ESSENTIAL HYPERTENSION: Primary | ICD-10-CM

## 2022-11-10 NOTE — PROGRESS NOTES
BP still borderline. I recommend adding low dose amlodipine to make sure it's always below 140/90. Let me know if she agrees.

## 2022-11-11 RX ORDER — AMLODIPINE BESYLATE 2.5 MG/1
2.5 TABLET ORAL DAILY
Qty: 90 TABLET | Refills: 1 | Status: SHIPPED | OUTPATIENT
Start: 2022-11-11 | End: 2022-11-24

## 2022-11-11 NOTE — TELEPHONE ENCOUNTER
Informed patient of message below.   Patient states that she is willing to start the amlodipine.  Please send to pharmacy.  Patient is scheduled in 2 weeks for nurse only bp check.        Xenia Patel MD at 10/25/2022 11:30 AM    Status: Signed   BP still borderline. I recommend adding low dose amlodipine to make sure it's always below 140/90. Let me know if she agrees.

## 2022-11-17 DIAGNOSIS — I10 ESSENTIAL HYPERTENSION: ICD-10-CM

## 2022-11-18 NOTE — TELEPHONE ENCOUNTER
"Routing refill request to provider for review/approval because:  BP not in range.    Last Written Prescription Date:  11/24/21  Last Fill Quantity: 90,  # refills: 3   Last office visit provider:  10/6/22     Requested Prescriptions   Pending Prescriptions Disp Refills     hydrochlorothiazide (HYDRODIURIL) 25 MG tablet [Pharmacy Med Name: hydroCHLOROthiazide Oral Tablet 25 MG] 90 tablet 0     Sig: Take 1 tablet (25 mg) by mouth ONCE daily.       Diuretics (Including Combos) Protocol Failed - 11/18/2022 12:41 PM        Failed - Blood pressure under 140/90 in past 12 months     BP Readings from Last 3 Encounters:   10/25/22 (!) 145/63   10/06/22 (!) 153/70   08/03/22 (!) 146/78                 Passed - Recent (12 mo) or future (30 days) visit within the authorizing provider's specialty     Patient has had an office visit with the authorizing provider or a provider within the authorizing providers department within the previous 12 mos or has a future within next 30 days. See \"Patient Info\" tab in inbasket, or \"Choose Columns\" in Meds & Orders section of the refill encounter.              Passed - Medication is active on med list        Passed - Patient is age 18 or older        Passed - No active pregancy on record        Passed - Normal serum creatinine on file in past 12 months     Recent Labs   Lab Test 10/06/22  1007   CR 0.92              Passed - Normal serum potassium on file in past 12 months     Recent Labs   Lab Test 10/06/22  1007   POTASSIUM 4.5                    Passed - Normal serum sodium on file in past 12 months     Recent Labs   Lab Test 10/06/22  1007                 Passed - No positive pregnancy test in past 12 months             Tushar Woodward RN 11/18/22 12:41 PM  "

## 2022-11-21 RX ORDER — HYDROCHLOROTHIAZIDE 25 MG/1
25 TABLET ORAL DAILY
Qty: 90 TABLET | Refills: 3 | Status: SHIPPED | OUTPATIENT
Start: 2022-11-21 | End: 2023-11-30

## 2022-11-23 ENCOUNTER — ALLIED HEALTH/NURSE VISIT (OUTPATIENT)
Dept: FAMILY MEDICINE | Facility: CLINIC | Age: 67
End: 2022-11-23
Payer: MEDICARE

## 2022-11-23 VITALS — HEART RATE: 90 BPM | SYSTOLIC BLOOD PRESSURE: 140 MMHG | DIASTOLIC BLOOD PRESSURE: 66 MMHG

## 2022-11-23 DIAGNOSIS — I10 ESSENTIAL HYPERTENSION: Primary | ICD-10-CM

## 2022-11-23 NOTE — PROGRESS NOTES
Follow Up Blood Pressure Check    Rosalba Bernal is a 67 year old female recommended to follow up for blood pressure check by Xenia Patel. Anihypertensive medications and adherence were verified: Yes.     Reason for visit:  Telephone encounter 11/10/22-Informed patient of message below. Patient states that she is willing to start the amlodipine.  Please send to pharmacy.Patient is scheduled in 2 weeks for nurse only bp check.       Medication change at last visit: Amlodipine 2.5 mg daily-pt has taken x 1 week.    Today's Vitals:   Vitals:    11/23/22 1035 11/23/22 1036 11/23/22 1045   BP: (!) 160/73 (!) 145/71 (!) 140/66   BP Location: Right arm Left arm Right arm   Patient Position: Sitting Sitting Sitting   Cuff Size: Adult Large Adult Large Adult Large   Pulse: 101 99 90       Home blood pressure readings brought in today:   none    Lowest blood pressure today is less than 140/90 and they deny signs or symptoms of new onset: denies.   Sampson Sánchez    Current Outpatient Medications   Medication Sig Dispense Refill     amLODIPine (NORVASC) 2.5 MG tablet Take 1 tablet (2.5 mg) by mouth daily 90 tablet 1     azelastine (ASTELIN) 0.1 % nasal spray Spray 1 spray into both nostrils 2 times daily 30 mL 1     diphenhydrAMINE (BENADRYL) 25 mg capsule [DIPHENHYDRAMINE (BENADRYL) 25 MG CAPSULE] Take 25 mg by mouth at bedtime.              fexofenadine (ALLEGRA) 60 MG tablet [FEXOFENADINE (ALLEGRA) 60 MG TABLET] Take 60 mg by mouth daily.       fish oil-omega-3 fatty acids 1000 MG capsule Take 2 capsules by mouth       hydrochlorothiazide (HYDRODIURIL) 25 MG tablet Take 1 tablet (25 mg) by mouth daily Take 1 tablet (25 mg) by mouth ONCE daily. 90 tablet 3     ibuprofen (ADVIL) 200 MG tablet [IBUPROFEN (ADVIL) 200 MG TABLET] Take 200 mg by mouth every 6 (six) hours as needed for pain.       latanoprost (XALATAN) 0.005 % ophthalmic solution instill 1 drop into Both Eyes every evening       losartan (COZAAR) 50 MG  tablet Take 2 tablets (100 mg) by mouth daily 180 tablet 3     naproxen sodium (ALEVE) 220 MG tablet [NAPROXEN SODIUM (ALEVE) 220 MG TABLET] Take 220 mg by mouth 2 (two) times a day with meals.       pravastatin (PRAVACHOL) 80 MG tablet Take 1 tablet (80 mg) by mouth daily 90 tablet 1     risperiDONE (RISPERDAL) 4 MG tablet Take 4 mg by mouth daily        valACYclovir (VALTREX) 500 MG tablet Take 1 tablet (500 mg) by mouth daily 90 tablet 1

## 2022-11-24 DIAGNOSIS — I10 ESSENTIAL HYPERTENSION: ICD-10-CM

## 2022-11-24 RX ORDER — AMLODIPINE BESYLATE 5 MG/1
5 TABLET ORAL DAILY
Qty: 90 TABLET | Refills: 1 | Status: SHIPPED | OUTPATIENT
Start: 2022-11-24 | End: 2023-05-30

## 2022-11-24 NOTE — PROGRESS NOTES
Pls let pt know BP is still borderline. I'm going to increase amlodipine to 5 mg daily. Let me know if she has any question.

## 2022-11-25 ENCOUNTER — TELEPHONE (OUTPATIENT)
Dept: FAMILY MEDICINE | Facility: CLINIC | Age: 67
End: 2022-11-25

## 2022-11-25 ENCOUNTER — NURSE TRIAGE (OUTPATIENT)
Dept: NURSING | Facility: CLINIC | Age: 67
End: 2022-11-25

## 2022-11-25 NOTE — TELEPHONE ENCOUNTER
KOI:  Called and informed patient of message below.  Patient states that her sister that is a nurse told her to not take a lot of blood pressure medications. She is going to discuss the increase with her sister and let us know what she plans on doing.       Xenia Patel MD at 11/23/2022 10:40 AM    Status: Signed   Pls let pt know BP is still borderline. I'm going to increase amlodipine to 5 mg daily. Let me know if she has any question.

## 2022-11-25 NOTE — TELEPHONE ENCOUNTER
Got a call from Violeta. She told her Dr wants her to increase amlodipine to 5 mg from 2.5 mg. She's going to increase it to 5 mg. Just wanted you to know this. She has no questions at this time.  Keyona King RN  [facility]    Reason for Disposition    Information only question and nurse able to answer    Additional Information    Negative: Nursing judgment    Negative: Nursing judgment    Negative: Nursing judgment    Negative: Nursing judgment    Protocols used: INFORMATION ONLY CALL - NO TRIAGE-A-OH

## 2022-12-30 ENCOUNTER — ALLIED HEALTH/NURSE VISIT (OUTPATIENT)
Dept: FAMILY MEDICINE | Facility: CLINIC | Age: 67
End: 2022-12-30
Payer: MEDICARE

## 2022-12-30 VITALS
DIASTOLIC BLOOD PRESSURE: 57 MMHG | HEART RATE: 102 BPM | RESPIRATION RATE: 20 BRPM | SYSTOLIC BLOOD PRESSURE: 123 MMHG | OXYGEN SATURATION: 96 %

## 2022-12-30 DIAGNOSIS — I10 ESSENTIAL HYPERTENSION: Primary | ICD-10-CM

## 2022-12-30 PROCEDURE — 99207 PR NO CHARGE NURSE ONLY: CPT

## 2022-12-30 NOTE — PROGRESS NOTES
Follow Up Blood Pressure Check    Rosalba Bernal is a 67 year old female recommended to follow up for blood pressure check by Xenia Patel. Anihypertensive medications and adherence were verified: Yes.     Reason for visit: Elevated Blood pressure     Medication change at last visit: increase amlodipine to 5 mg daily    Today's Vitals:   Vitals:    12/30/22 1252 12/30/22 1301   BP: (!) 142/57 123/57   BP Location: Left arm Right arm   Patient Position: Right side Sitting   Cuff Size: Adult Large Adult Large   Pulse: 106 102   Resp: 20    SpO2: 96%            Lowest blood pressure today is 123/57 and they deny signs or symptoms of new onset: severe headache, fatigue, confusion, vision changes, chest pain, pounding in the chest, neck, ears, irregular heartbeat, difficulty breathing and blood in the urine.  Please inform patient of his/her blood pressure today.  If they are asymptomatic, the patient is to continue current medications.  This message will be routed to their provider, and they will be notified if a change in medication is recommended.        Rhiannon Fabian    Current Outpatient Medications   Medication Sig Dispense Refill     amLODIPine (NORVASC) 5 MG tablet Take 1 tablet (5 mg) by mouth daily 90 tablet 1     azelastine (ASTELIN) 0.1 % nasal spray Spray 1 spray into both nostrils 2 times daily 30 mL 1     diphenhydrAMINE (BENADRYL) 25 mg capsule [DIPHENHYDRAMINE (BENADRYL) 25 MG CAPSULE] Take 25 mg by mouth at bedtime.              fexofenadine (ALLEGRA) 60 MG tablet [FEXOFENADINE (ALLEGRA) 60 MG TABLET] Take 60 mg by mouth daily.       fish oil-omega-3 fatty acids 1000 MG capsule Take 2 capsules by mouth       hydrochlorothiazide (HYDRODIURIL) 25 MG tablet Take 1 tablet (25 mg) by mouth daily Take 1 tablet (25 mg) by mouth ONCE daily. 90 tablet 3     ibuprofen (ADVIL) 200 MG tablet [IBUPROFEN (ADVIL) 200 MG TABLET] Take 200 mg by mouth every 6 (six) hours as needed for pain.       latanoprost  (XALATAN) 0.005 % ophthalmic solution instill 1 drop into Both Eyes every evening       losartan (COZAAR) 50 MG tablet Take 2 tablets (100 mg) by mouth daily 180 tablet 3     naproxen sodium (ALEVE) 220 MG tablet [NAPROXEN SODIUM (ALEVE) 220 MG TABLET] Take 220 mg by mouth 2 (two) times a day with meals.       pravastatin (PRAVACHOL) 80 MG tablet Take 1 tablet (80 mg) by mouth daily 90 tablet 1     risperiDONE (RISPERDAL) 4 MG tablet Take 4 mg by mouth daily        valACYclovir (VALTREX) 500 MG tablet Take 1 tablet (500 mg) by mouth daily 90 tablet 1

## 2022-12-31 NOTE — PROGRESS NOTES
Pls call pt: blood pressure better. Continue with current medications without change. Let me know if she has any question.

## 2023-01-02 ENCOUNTER — TELEPHONE (OUTPATIENT)
Dept: FAMILY MEDICINE | Facility: CLINIC | Age: 68
End: 2023-01-02

## 2023-01-02 NOTE — TELEPHONE ENCOUNTER
"Called patient and relayed message below, as well as scheduled her for follow up visit.    \"Pls call pt: blood pressure better. Continue with current medications without change. Let me know if she has any question.\"-Dr. Highness Branden Billingsley RN     [facility]    "

## 2023-02-28 ENCOUNTER — TELEPHONE (OUTPATIENT)
Dept: FAMILY MEDICINE | Facility: CLINIC | Age: 68
End: 2023-02-28
Payer: MEDICARE

## 2023-02-28 DIAGNOSIS — A60.00 GENITAL HERPES SIMPLEX, UNSPECIFIED SITE: ICD-10-CM

## 2023-02-28 RX ORDER — VALACYCLOVIR HYDROCHLORIDE 500 MG/1
500 TABLET, FILM COATED ORAL DAILY
Qty: 90 TABLET | Refills: 0 | Status: SHIPPED | OUTPATIENT
Start: 2023-02-28 | End: 2023-06-12

## 2023-02-28 NOTE — TELEPHONE ENCOUNTER
General Call    Contacts       Type Contact Phone/Fax    02/28/2023 08:06 AM CST Phone (Incoming) Rosalba Bernal (Self) [phone] (H)        Reason for Call:  valACYclovir (VALTREX) 500 MG tablet  Patient Sig: Take 1 tablet (500 mg) by mouth daily    What are your questions or concerns:  Patient states that staying on valtrex long term is really helping with the herpes, but the shorter bursts did not help it just came back again right after finishing the course.     Patient is hoping that she can stay on this medication permanently/long term. Her meds will run out beginning of march with no refills. She has an appointment on 04/06/23 for a follow-up but will be out of meds before that date. She would like a refill to at least bridge her until this can be discussed during her appointment if provider needs to evaluate her first.    Please advise.    Date of last appointment with provider: 10/06/22  Next appointment: 04/06/23    Okay to leave a detailed message?: Yes at Home number on file:  [phone]    Patient doesn't have an answering machine, can receive call backs today after 2 pm or would like a letter response in the mail.

## 2023-03-01 NOTE — TELEPHONE ENCOUNTER
Called and informed patient of message below.    Patient is wondering if she should continue taking 1 tablet daily for 6 month?

## 2023-03-07 DIAGNOSIS — E78.00 HYPERCHOLESTEREMIA: ICD-10-CM

## 2023-03-09 RX ORDER — PRAVASTATIN SODIUM 80 MG/1
TABLET ORAL
Qty: 90 TABLET | Refills: 2 | Status: SHIPPED | OUTPATIENT
Start: 2023-03-09 | End: 2023-11-30

## 2023-03-09 NOTE — TELEPHONE ENCOUNTER
"  Last Written Prescription Date:  08/22/2022  Last Fill Quantity: 90,  # refills: 1   Last office visit provider:  10/06/2022     Requested Prescriptions   Pending Prescriptions Disp Refills     pravastatin (PRAVACHOL) 80 MG tablet [Pharmacy Med Name: Pravastatin Sodium Oral Tablet 80 MG] 90 tablet 0     Sig: TAKE 1 TABLET (80 MG) BY MOUTH ONCE DAILY       Statins Protocol Passed - 3/9/2023  9:20 AM        Passed - LDL on file in past 12 months     Recent Labs   Lab Test 10/06/22  1007   LDL 59             Passed - No abnormal creatine kinase in past 12 months     No lab results found.             Passed - Recent (12 mo) or future (30 days) visit within the authorizing provider's specialty     Patient has had an office visit with the authorizing provider or a provider within the authorizing providers department within the previous 12 mos or has a future within next 30 days. See \"Patient Info\" tab in inbasket, or \"Choose Columns\" in Meds & Orders section of the refill encounter.              Passed - Medication is active on med list        Passed - Patient is age 18 or older        Passed - No active pregnancy on record        Passed - No positive pregnancy test in past 12 months             Day Stinson RN 03/09/23 9:21 AM  "

## 2023-03-22 ENCOUNTER — TRANSFERRED RECORDS (OUTPATIENT)
Dept: HEALTH INFORMATION MANAGEMENT | Facility: CLINIC | Age: 68
End: 2023-03-22

## 2023-03-22 LAB — RETINOPATHY: NEGATIVE

## 2023-03-31 DIAGNOSIS — I10 ESSENTIAL HYPERTENSION: ICD-10-CM

## 2023-03-31 RX ORDER — LOSARTAN POTASSIUM 50 MG/1
100 TABLET ORAL DAILY
Qty: 180 TABLET | Refills: 1 | Status: SHIPPED | OUTPATIENT
Start: 2023-03-31 | End: 2023-09-25

## 2023-03-31 NOTE — TELEPHONE ENCOUNTER
"Last Written Prescription Date:  4/7/2022  Last Fill Quantity: 180,  # refills: 3   Last office visit provider:  10/6/2022     Requested Prescriptions   Pending Prescriptions Disp Refills     losartan (COZAAR) 50 MG tablet [Pharmacy Med Name: Losartan Potassium Oral Tablet 50 MG] 180 tablet 0     Sig: Take 2 tablets (100 mg) by mouth daily       Angiotensin-II Receptors Passed - 3/31/2023 12:51 PM        Passed - Last blood pressure under 140/90 in past 12 months     BP Readings from Last 3 Encounters:   12/30/22 123/57   11/23/22 (!) 140/66   10/25/22 (!) 145/63                 Passed - Recent (12 mo) or future (30 days) visit within the authorizing provider's specialty     Patient has had an office visit with the authorizing provider or a provider within the authorizing providers department within the previous 12 mos or has a future within next 30 days. See \"Patient Info\" tab in inbasket, or \"Choose Columns\" in Meds & Orders section of the refill encounter.              Passed - Medication is active on med list        Passed - Patient is age 18 or older        Passed - No active pregnancy on record        Passed - Normal serum creatinine on file in past 12 months     Recent Labs   Lab Test 10/06/22  1007   CR 0.92       Ok to refill medication if creatinine is low          Passed - Normal serum potassium on file in past 12 months     Recent Labs   Lab Test 10/06/22  1007   POTASSIUM 4.5                    Passed - No positive pregnancy test in past 12 months             Tracy Schultz RN 03/31/23 5:58 PM  "

## 2023-04-06 ENCOUNTER — OFFICE VISIT (OUTPATIENT)
Dept: FAMILY MEDICINE | Facility: CLINIC | Age: 68
End: 2023-04-06
Payer: MEDICARE

## 2023-04-06 VITALS
TEMPERATURE: 97.4 F | SYSTOLIC BLOOD PRESSURE: 132 MMHG | DIASTOLIC BLOOD PRESSURE: 63 MMHG | RESPIRATION RATE: 16 BRPM | OXYGEN SATURATION: 97 % | HEART RATE: 79 BPM | WEIGHT: 255.2 LBS | HEIGHT: 62 IN | BODY MASS INDEX: 46.96 KG/M2

## 2023-04-06 DIAGNOSIS — I10 ESSENTIAL HYPERTENSION: ICD-10-CM

## 2023-04-06 DIAGNOSIS — F20.9 SCHIZOPHRENIA, CHRONIC CONDITION (H): ICD-10-CM

## 2023-04-06 DIAGNOSIS — E66.01 MORBID OBESITY (H): ICD-10-CM

## 2023-04-06 DIAGNOSIS — E11.42 TYPE 2 DIABETES MELLITUS WITH DIABETIC POLYNEUROPATHY, WITHOUT LONG-TERM CURRENT USE OF INSULIN (H): Primary | ICD-10-CM

## 2023-04-06 DIAGNOSIS — R94.4 DECREASED GFR: ICD-10-CM

## 2023-04-06 PROCEDURE — 99214 OFFICE O/P EST MOD 30 MIN: CPT | Performed by: FAMILY MEDICINE

## 2023-04-06 RX ORDER — TIMOLOL MALEATE 5 MG/ML
SOLUTION/ DROPS OPHTHALMIC
COMMUNITY
Start: 2023-03-22

## 2023-04-06 NOTE — PROGRESS NOTES
"  Assessment & Plan     Type 2 diabetes mellitus with diabetic polyneuropathy, without long-term current use of insulin (H)  a1c 6.3. discussed decreasing sugar/carbs. F/up in 6 months. She doesn't want to start any diabetes med for now  Declines foot exam today.    Essential hypertension  Well controlled. Continue with losartan and amlodipine    Schizophrenia, chronic condition (H)  Stable. Sees  psych    Morbid obesity (H)  Discussed diet. Hard for her to walk due to OA    Decreased GFR  Per lab obtained at . GFR 57.   Monitor for now.          BMI:   Estimated body mass index is 47.44 kg/m  as calculated from the following:    Height as of this encounter: 1.562 m (5' 1.5\").    Weight as of this encounter: 115.8 kg (255 lb 3.2 oz).   Weight management plan: Discussed healthy diet and exercise guidelines      Xenia Patel MD  [facility]    Carlos Navarrete is a 67 year old, presenting for the following health issues:  Diabetes (DM check, no concerns/A1c done on 03/22/23 at [facility])        4/6/2023    11:20 AM   Additional Questions   Roomed by Violeta ELLIS CMA     History of Present Illness       Diabetes:   She presents for follow up of diabetes.  She is not checking blood glucose. She has no concerns regarding her diabetes at this time.  She is having excessive thirst.         She eats 4 or more servings of fruits and vegetables daily.She consumes 1 sweetened beverage(s) daily.She exercises with enough effort to increase her heart rate 9 or less minutes per day.  She exercises with enough effort to increase her heart rate 3 or less days per week.   She is taking medications regularly.     Chief Complaint   Patient presents with     Diabetes     DM check, no concerns  A1c done on 03/22/23 at [facility]     Has previously declined dexa and colonoscopy  On valtrex daily to prevent herpetic flares.       Review of Systems   Constitutional, HEENT, cardiovascular, " "pulmonary, gi and gu systems are negative, except as otherwise noted.      Objective    /63 (BP Location: Left arm, Patient Position: Sitting, Cuff Size: Adult Large)   Pulse 79   Temp 97.4  F (36.3  C) (Oral)   Resp 16   Ht 1.562 m (5' 1.5\")   Wt 115.8 kg (255 lb 3.2 oz)   SpO2 97%   BMI 47.44 kg/m    Body mass index is 47.44 kg/m .  Physical Exam   GENERAL: healthy, alert and no distress  RESP: lungs clear to auscultation - no rales, rhonchi or wheezes  CV: regular rate and rhythm, normal S1 S2, no S3 or S4, no murmur, click or rub,   MS: no gross musculoskeletal defects noted, no edema  NEURO: Normal strength and tone, mentation intact and speech normal  PSYCH: mentation appears normal, affect normal/bright  Diabetic foot exam: pt declined today.     No results found for this or any previous visit (from the past 24 hour(s)).                " yes...

## 2023-05-30 DIAGNOSIS — I10 ESSENTIAL HYPERTENSION: ICD-10-CM

## 2023-05-30 RX ORDER — AMLODIPINE BESYLATE 5 MG/1
5 TABLET ORAL DAILY
Qty: 90 TABLET | Refills: 0 | Status: SHIPPED | OUTPATIENT
Start: 2023-05-30 | End: 2023-09-07

## 2023-05-31 NOTE — TELEPHONE ENCOUNTER
"Last Written Prescription Date:  11/24/22  Last Fill Quantity: 90,  # refills: 1   Last office visit provider:  4/6/23     Requested Prescriptions   Pending Prescriptions Disp Refills     amLODIPine (NORVASC) 5 MG tablet [Pharmacy Med Name: amLODIPine Besylate Oral Tablet 5 MG] 90 tablet 0     Sig: Take 1 tablet (5 mg) by mouth daily       Calcium Channel Blockers Protocol  Passed - 5/30/2023  9:08 AM        Passed - Blood pressure under 140/90 in past 12 months     BP Readings from Last 3 Encounters:   04/06/23 132/63   12/30/22 123/57   11/23/22 (!) 140/66                 Passed - Recent (12 mo) or future (30 days) visit within the authorizing provider's specialty     Patient has had an office visit with the authorizing provider or a provider within the authorizing providers department within the previous 12 mos or has a future within next 30 days. See \"Patient Info\" tab in inbasket, or \"Choose Columns\" in Meds & Orders section of the refill encounter.              Passed - Medication is active on med list        Passed - Patient is age 18 or older        Passed - No active pregnancy on record        Passed - Normal serum creatinine on file in past 12 months     Recent Labs   Lab Test 10/06/22  1007   CR 0.92       Ok to refill medication if creatinine is low          Passed - No positive pregnancy test in past 12 months             Ariana Masters RN 05/30/23 8:04 PM  "

## 2023-06-12 DIAGNOSIS — A60.00 GENITAL HERPES SIMPLEX, UNSPECIFIED SITE: ICD-10-CM

## 2023-06-12 RX ORDER — VALACYCLOVIR HYDROCHLORIDE 500 MG/1
TABLET, FILM COATED ORAL
Qty: 90 TABLET | Refills: 3 | Status: SHIPPED | OUTPATIENT
Start: 2023-06-12 | End: 2024-05-22

## 2023-06-12 NOTE — TELEPHONE ENCOUNTER
"Last Written Prescription Date:  2/28/2023  Last Fill Quantity: 90,  # refills: 0   Last office visit provider:  4/6/2023     Requested Prescriptions   Pending Prescriptions Disp Refills     valACYclovir (VALTREX) 500 MG tablet [Pharmacy Med Name: valACYclovir HCl Oral Tablet 500 MG] 90 tablet 0     Sig: Take 1 tablet (500 mg) by mouth once daily       Antivirals for Herpes Protocol Passed - 6/12/2023  2:35 PM        Passed - Patient is age 12 or older        Passed - Recent (12 mo) or future (30 days) visit within the authorizing provider's specialty     Patient has had an office visit with the authorizing provider or a provider within the authorizing providers department within the previous 12 mos or has a future within next 30 days. See \"Patient Info\" tab in inbasket, or \"Choose Columns\" in Meds & Orders section of the refill encounter.              Passed - Medication is active on med list        Passed - Normal serum creatinine on file in past 12 months     Recent Labs   Lab Test 10/06/22  1007   CR 0.92       Ok to refill medication if creatinine is low               CORRINE LEVY RN 06/12/23 2:35 PM  "

## 2023-06-28 ENCOUNTER — TRANSFERRED RECORDS (OUTPATIENT)
Dept: HEALTH INFORMATION MANAGEMENT | Facility: CLINIC | Age: 68
End: 2023-06-28
Payer: MEDICARE

## 2023-06-28 LAB — RETINOPATHY: NEGATIVE

## 2023-09-07 DIAGNOSIS — I10 ESSENTIAL HYPERTENSION: ICD-10-CM

## 2023-09-07 RX ORDER — AMLODIPINE BESYLATE 5 MG/1
5 TABLET ORAL DAILY
Qty: 90 TABLET | Refills: 0 | Status: SHIPPED | OUTPATIENT
Start: 2023-09-07 | End: 2023-12-05

## 2023-09-08 NOTE — TELEPHONE ENCOUNTER
"  Last Written Prescription Date:  5/30/23  Last Fill Quantity: 90,  # refills: 0   Last office visit provider:   4/6/23    Requested Prescriptions   Pending Prescriptions Disp Refills    amLODIPine (NORVASC) 5 MG tablet [Pharmacy Med Name: amLODIPine Besylate Oral Tablet 5 MG] 90 tablet 0     Sig: TAKE 1 TABLET (5 MG) BY MOUTH ONCE DAILY       Calcium Channel Blockers Protocol  Passed - 9/7/2023  3:31 PM        Passed - Blood pressure under 140/90 in past 12 months     BP Readings from Last 3 Encounters:   04/06/23 132/63   12/30/22 123/57   11/23/22 (!) 140/66                 Passed - Recent (12 mo) or future (30 days) visit within the authorizing provider's specialty     Patient has had an office visit with the authorizing provider or a provider within the authorizing providers department within the previous 12 mos or has a future within next 30 days. See \"Patient Info\" tab in inbasket, or \"Choose Columns\" in Meds & Orders section of the refill encounter.              Passed - Medication is active on med list        Passed - Patient is age 18 or older        Passed - No active pregnancy on record        Passed - Normal serum creatinine on file in past 12 months     Recent Labs   Lab Test 10/06/22  1007   CR 0.92       Ok to refill medication if creatinine is low          Passed - No positive pregnancy test in past 12 months             Lianet Tsai RN 09/07/23 7:02 PM  "

## 2023-09-25 DIAGNOSIS — I10 ESSENTIAL HYPERTENSION: ICD-10-CM

## 2023-09-25 RX ORDER — LOSARTAN POTASSIUM 50 MG/1
100 TABLET ORAL DAILY
Qty: 60 TABLET | Refills: 0 | Status: SHIPPED | OUTPATIENT
Start: 2023-09-25 | End: 2023-10-12

## 2023-10-12 ENCOUNTER — ANCILLARY PROCEDURE (OUTPATIENT)
Dept: GENERAL RADIOLOGY | Facility: CLINIC | Age: 68
End: 2023-10-12
Attending: FAMILY MEDICINE
Payer: MEDICARE

## 2023-10-12 ENCOUNTER — OFFICE VISIT (OUTPATIENT)
Dept: FAMILY MEDICINE | Facility: CLINIC | Age: 68
End: 2023-10-12
Payer: MEDICARE

## 2023-10-12 VITALS
BODY MASS INDEX: 50.06 KG/M2 | TEMPERATURE: 98.5 F | HEART RATE: 96 BPM | RESPIRATION RATE: 20 BRPM | OXYGEN SATURATION: 97 % | DIASTOLIC BLOOD PRESSURE: 64 MMHG | WEIGHT: 255 LBS | HEIGHT: 60 IN | SYSTOLIC BLOOD PRESSURE: 137 MMHG

## 2023-10-12 DIAGNOSIS — R94.4 DECREASED GFR: ICD-10-CM

## 2023-10-12 DIAGNOSIS — Z23 NEED FOR SHINGLES VACCINE: ICD-10-CM

## 2023-10-12 DIAGNOSIS — F20.9 SCHIZOPHRENIA, CHRONIC CONDITION (H): ICD-10-CM

## 2023-10-12 DIAGNOSIS — Z00.00 ENCOUNTER FOR MEDICARE ANNUAL WELLNESS EXAM: Primary | ICD-10-CM

## 2023-10-12 DIAGNOSIS — Z29.11 NEED FOR VACCINATION AGAINST RESPIRATORY SYNCYTIAL VIRUS: ICD-10-CM

## 2023-10-12 DIAGNOSIS — R06.01 ORTHOPNEA: ICD-10-CM

## 2023-10-12 DIAGNOSIS — R06.02 SOB (SHORTNESS OF BREATH): ICD-10-CM

## 2023-10-12 DIAGNOSIS — A60.00 GENITAL HERPES SIMPLEX, UNSPECIFIED SITE: ICD-10-CM

## 2023-10-12 DIAGNOSIS — E11.42 TYPE 2 DIABETES MELLITUS WITH DIABETIC POLYNEUROPATHY, WITHOUT LONG-TERM CURRENT USE OF INSULIN (H): ICD-10-CM

## 2023-10-12 DIAGNOSIS — J34.89 NASAL DISCHARGE: ICD-10-CM

## 2023-10-12 DIAGNOSIS — I10 ESSENTIAL HYPERTENSION: ICD-10-CM

## 2023-10-12 DIAGNOSIS — J34.89 CHRONIC NASAL DISCHARGE: ICD-10-CM

## 2023-10-12 DIAGNOSIS — Z12.11 SCREEN FOR COLON CANCER: ICD-10-CM

## 2023-10-12 DIAGNOSIS — E66.01 MORBID OBESITY (H): ICD-10-CM

## 2023-10-12 DIAGNOSIS — Z23 NEED FOR PROPHYLACTIC VACCINATION AGAINST HEPATITIS B VIRUS: ICD-10-CM

## 2023-10-12 DIAGNOSIS — E78.00 HYPERCHOLESTEREMIA: ICD-10-CM

## 2023-10-12 LAB
ALBUMIN SERPL BCG-MCNC: 4 G/DL (ref 3.5–5.2)
ALP SERPL-CCNC: 73 U/L (ref 35–104)
ALT SERPL W P-5'-P-CCNC: 29 U/L (ref 0–50)
ANION GAP SERPL CALCULATED.3IONS-SCNC: 12 MMOL/L (ref 7–15)
ANION GAP SERPL CALCULATED.3IONS-SCNC: 12 MMOL/L (ref 7–15)
AST SERPL W P-5'-P-CCNC: 29 U/L (ref 0–45)
BILIRUB SERPL-MCNC: 0.4 MG/DL
BUN SERPL-MCNC: 19.6 MG/DL (ref 8–23)
BUN SERPL-MCNC: 19.6 MG/DL (ref 8–23)
CALCIUM SERPL-MCNC: 9.7 MG/DL (ref 8.8–10.2)
CALCIUM SERPL-MCNC: 9.7 MG/DL (ref 8.8–10.2)
CHLORIDE SERPL-SCNC: 99 MMOL/L (ref 98–107)
CHLORIDE SERPL-SCNC: 99 MMOL/L (ref 98–107)
CHOLEST SERPL-MCNC: 155 MG/DL
CREAT SERPL-MCNC: 1.02 MG/DL (ref 0.51–0.95)
CREAT SERPL-MCNC: 1.02 MG/DL (ref 0.51–0.95)
DEPRECATED HCO3 PLAS-SCNC: 27 MMOL/L (ref 22–29)
DEPRECATED HCO3 PLAS-SCNC: 27 MMOL/L (ref 22–29)
EGFRCR SERPLBLD CKD-EPI 2021: 60 ML/MIN/1.73M2
EGFRCR SERPLBLD CKD-EPI 2021: 60 ML/MIN/1.73M2
ERYTHROCYTE [DISTWIDTH] IN BLOOD BY AUTOMATED COUNT: 13.1 % (ref 10–15)
GLUCOSE SERPL-MCNC: 144 MG/DL (ref 70–99)
GLUCOSE SERPL-MCNC: 144 MG/DL (ref 70–99)
HBA1C MFR BLD: 5.8 % (ref 0–5.6)
HCT VFR BLD AUTO: 41.9 % (ref 35–47)
HDLC SERPL-MCNC: 38 MG/DL
HGB BLD-MCNC: 13.7 G/DL (ref 11.7–15.7)
HOLD SPECIMEN: NORMAL
LDLC SERPL CALC-MCNC: 84 MG/DL
MCH RBC QN AUTO: 30.3 PG (ref 26.5–33)
MCHC RBC AUTO-ENTMCNC: 32.7 G/DL (ref 31.5–36.5)
MCV RBC AUTO: 93 FL (ref 78–100)
NONHDLC SERPL-MCNC: 117 MG/DL
NT-PROBNP SERPL-MCNC: 45 PG/ML (ref 0–900)
PLATELET # BLD AUTO: 250 10E3/UL (ref 150–450)
POTASSIUM SERPL-SCNC: 4.1 MMOL/L (ref 3.4–5.3)
POTASSIUM SERPL-SCNC: 4.1 MMOL/L (ref 3.4–5.3)
PROT SERPL-MCNC: 7.1 G/DL (ref 6.4–8.3)
RBC # BLD AUTO: 4.52 10E6/UL (ref 3.8–5.2)
SODIUM SERPL-SCNC: 138 MMOL/L (ref 135–145)
SODIUM SERPL-SCNC: 138 MMOL/L (ref 135–145)
TRIGL SERPL-MCNC: 166 MG/DL
WBC # BLD AUTO: 7.8 10E3/UL (ref 4–11)

## 2023-10-12 PROCEDURE — 83880 ASSAY OF NATRIURETIC PEPTIDE: CPT | Performed by: FAMILY MEDICINE

## 2023-10-12 PROCEDURE — 85027 COMPLETE CBC AUTOMATED: CPT | Performed by: FAMILY MEDICINE

## 2023-10-12 PROCEDURE — 84295 ASSAY OF SERUM SODIUM: CPT | Mod: 59 | Performed by: FAMILY MEDICINE

## 2023-10-12 PROCEDURE — G0439 PPPS, SUBSEQ VISIT: HCPCS | Performed by: FAMILY MEDICINE

## 2023-10-12 PROCEDURE — 36415 COLL VENOUS BLD VENIPUNCTURE: CPT | Performed by: FAMILY MEDICINE

## 2023-10-12 PROCEDURE — 83036 HEMOGLOBIN GLYCOSYLATED A1C: CPT | Performed by: FAMILY MEDICINE

## 2023-10-12 PROCEDURE — 80061 LIPID PANEL: CPT | Performed by: FAMILY MEDICINE

## 2023-10-12 PROCEDURE — 90480 ADMN SARSCOV2 VAC 1/ONLY CMP: CPT | Performed by: FAMILY MEDICINE

## 2023-10-12 PROCEDURE — 99214 OFFICE O/P EST MOD 30 MIN: CPT | Mod: 25 | Performed by: FAMILY MEDICINE

## 2023-10-12 PROCEDURE — 71046 X-RAY EXAM CHEST 2 VIEWS: CPT | Mod: TC | Performed by: RADIOLOGY

## 2023-10-12 PROCEDURE — 80053 COMPREHEN METABOLIC PANEL: CPT | Performed by: FAMILY MEDICINE

## 2023-10-12 PROCEDURE — 91320 SARSCV2 VAC 30MCG TRS-SUC IM: CPT | Performed by: FAMILY MEDICINE

## 2023-10-12 RX ORDER — RESPIRATORY SYNCYTIAL VIRUS VACCINE 120MCG/0.5
0.5 KIT INTRAMUSCULAR ONCE
Qty: 1 EACH | Refills: 0 | Status: CANCELLED | OUTPATIENT
Start: 2023-10-12 | End: 2023-10-12

## 2023-10-12 RX ORDER — METRONIDAZOLE 7.5 MG/G
GEL TOPICAL 2 TIMES DAILY
Status: CANCELLED | OUTPATIENT
Start: 2023-10-12

## 2023-10-12 RX ORDER — LOSARTAN POTASSIUM 50 MG/1
100 TABLET ORAL DAILY
Qty: 180 TABLET | Refills: 3 | Status: SHIPPED | OUTPATIENT
Start: 2023-10-12

## 2023-10-12 RX ORDER — BRIMONIDINE 5 MG/G
GEL TOPICAL
Status: CANCELLED | OUTPATIENT
Start: 2023-10-12

## 2023-10-12 RX ORDER — LOSARTAN POTASSIUM 50 MG/1
100 TABLET ORAL DAILY
Qty: 60 TABLET | Refills: 0 | Status: SHIPPED | OUTPATIENT
Start: 2023-10-12 | End: 2023-10-12

## 2023-10-12 NOTE — PATIENT INSTRUCTIONS
Patient Education   Personalized Prevention Plan  You are due for the preventive services outlined below.  Your care team is available to assist you in scheduling these services.  If you have already completed any of these items, please share that information with your care team to update in your medical record.  Health Maintenance Due   Topic Date Due     Osteoporosis Screening  Never done     Pneumococcal Vaccine (1 - PCV) Never done     Colorectal Cancer Screening  Never done     Zoster (Shingles) Vaccine (1 of 2) Never done     LUNG CANCER SCREENING  Never done     Hepatitis B Vaccine (1 of 3 - Risk 3-dose series) Never done     RSV VACCINE 60+ (1 - 1-dose 60+ series) Never done     Flu Vaccine (1) Never done     COVID-19 Vaccine (5 - 2023-24 season) 09/01/2023     Basic Metabolic Panel  10/06/2023     Cholesterol Lab  10/06/2023     Kidney Microalbumin Urine Test  10/06/2023     ANNUAL REVIEW OF HM ORDERS  10/06/2023     FALL RISK ASSESSMENT  10/06/2023

## 2023-10-12 NOTE — PROGRESS NOTES
SUBJECTIVE:   Rosalba is a 67 year old who presents for Preventive Visit.      10/12/2023    10:02 AM   Additional Questions   Roomed by Brii Charles LPN   Accompanied by -       Are you in the first 12 months of your Medicare coverage?  No    HPI  Chief Complaint   Patient presents with    Annual Visit     Patient wants to talk about rhinophyma    Diabetes     No concerns about her diabetes     Other concerns today: Doesn't want to get a sleep study anyway. She wakes up when she goes to bathroom. No one has said she snores. No bed partner. She sleeps upright on 2 pillows.   Wants to see ENT for chronic nasal discharge and irritated mucosa.     Have you ever done Advance Care Planning? (For example, a Health Directive, POLST, or a discussion with a medical provider or your loved ones about your wishes): No, advance care planning information given to patient to review.  Patient declined advance care planning discussion at this time.       Fall risk       Cognitive Screening   1) Repeat 3 items (Leader, Season, Table)    2) Clock draw: NORMAL  3) 3 item recall: Recalls 3 objects  Results: 3 items recalled: COGNITIVE IMPAIRMENT LESS LIKELY    Mini-CogTM Copyright S Clarke. Licensed by the author for use in [facility]; reprinted with permission (soob@.[facility]). All rights reserved.      Do you have sleep apnea, excessive snoring or daytime drowsiness? : no      Reviewed and updated as needed this visit by clinical staff   Tobacco  Allergies  Meds              Reviewed and updated as needed this visit by Provider                 Social History     Tobacco Use    Smoking status: Former     Packs/day: 1.00     Years: 28.00     Additional pack years: 0.00     Total pack years: 28.00     Types: Cigarettes    Smokeless tobacco: Never    Tobacco comments:     quit apx 35 yrs ago   Substance Use Topics    Alcohol use: Yes     Comment: Alcoholic Drinks/day: a beer every 4 months             10/6/2022     9:55 AM    Alcohol Use   Prescreen: >3 drinks/day or >7 drinks/week? No          No data to display              Do you have a current opioid prescription? No  Do you use any other controlled substances or medications that are not prescribed by a provider? None      Current providers sharing in care for this patient include:  Patient Care Team:  Xenia Patel MD as PCP - General (Family Medicine)  Xenia Patel MD as Assigned PCP  Alfredo Stewart MD as Assigned Surgical Provider    The following health maintenance items are reviewed in Epic and correct as of today:  Health Maintenance   Topic Date Due    DEXA  Never done    Pneumococcal Vaccine: 65+ Years (1 - PCV) Never done    COLORECTAL CANCER SCREENING  Never done    ZOSTER IMMUNIZATION (1 of 2) Never done    LUNG CANCER SCREENING  Never done    HEPATITIS B IMMUNIZATION (1 of 3 - Risk 3-dose series) Never done    RSV VACCINE 60+ (1 - 1-dose 60+ series) Never done    A1C  04/06/2023    INFLUENZA VACCINE (1) Never done    COVID-19 Vaccine (5 - 2023-24 season) 09/01/2023    BMP  10/06/2023    LIPID  10/06/2023    MICROALBUMIN  10/06/2023    ANNUAL REVIEW OF HM ORDERS  10/06/2023    FALL RISK ASSESSMENT  10/06/2023    MEDICARE ANNUAL WELLNESS VISIT  10/06/2023    DIABETIC FOOT EXAM  04/06/2024    MAMMO SCREENING  05/23/2024    EYE EXAM  06/28/2024    ADVANCE CARE PLANNING  10/06/2027    DTAP/TDAP/TD IMMUNIZATION (3 - Td or Tdap) 05/20/2031    HEPATITIS C SCREENING  Completed    PHQ-2 (once per calendar year)  Completed    IPV IMMUNIZATION  Aged Out    HPV IMMUNIZATION  Aged Out    MENINGITIS IMMUNIZATION  Aged Out     Lab work is in process      Mammogram Screening: Mammogram Screening: Recommended mammography every 1-2 years with patient discussion and risk factor consideration  Any new diagnosis of family breast, ovarian, or bowel cancer? No    FHS-7:       5/23/2022    12:11 PM 10/6/2022     9:56 AM   Breast CA Risk Assessment (FHS-7)   Did any of  "your first-degree relatives have breast or ovarian cancer? No No   Did any of your relatives have bilateral breast cancer? No Unknown   Did any man in your family have breast cancer? No Unknown   Did any woman in your family have breast and ovarian cancer? No Unknown   Did any woman in your family have breast cancer before age 50 y? No Unknown   Do you have 2 or more relatives with breast and/or ovarian cancer? No Unknown   Do you have 2 or more relatives with breast and/or bowel cancer? No Unknown       Mammogram Screening: Recommended mammography every 1-2 years with patient discussion and risk factor consideration  Pertinent mammograms are reviewed under the imaging tab.    Review of Systems  Constitutional, HEENT, cardiovascular, pulmonary, gi and gu systems are negative, except as otherwise noted.    OBJECTIVE:   /64 (BP Location: Left arm, Patient Position: Sitting, Cuff Size: Adult Large)   Pulse 96   Temp 98.5  F (36.9  C) (Oral)   Resp 20   Ht 1.52 m (4' 11.84\")   Wt 115.7 kg (255 lb)   SpO2 97%   BMI 50.06 kg/m   Estimated body mass index is 50.06 kg/m  as calculated from the following:    Height as of this encounter: 1.52 m (4' 11.84\").    Weight as of this encounter: 115.7 kg (255 lb).  Physical Exam  GENERAL APPEARANCE: healthy, alert and no distress  EYES: Eyes grossly normal to inspection, PERRL and conjunctivae and sclerae normal  HEENT: nasal turbinates appearing irritated and mildly erythematous   NECK: no adenopathy, no asymmetry, masses, or scars and thyroid normal to palpation  RESP: lungs clear to auscultation - no rales, rhonchi or wheezes  CV: regular rate and rhythm, normal S1 S2, no S3 or S4, no murmur, click or rub, no peripheral edema and  MS: no musculoskeletal defects are noted and gait is age appropriate without ataxia  SKIN: no suspicious lesions or rashes  NEURO: Normal strength and tone, sensory exam grossly normal, mentation intact and speech normal  PSYCH: mentation " appears normal and affect normal/bright  FEET:  MF TESTING: NL              SKIN EXAM:  NL              VASCULAR:   R DP  PULSE: +                                      L DP  PULSE: +                                      R PT  PULSE: +                                      L PT  PULSE: +      Diagnostic Test Results:  Labs reviewed in Epic  Results for orders placed or performed in visit on 10/12/23 (from the past 24 hour(s))   HEMOGLOBIN A1C   Result Value Ref Range    Hemoglobin A1C 5.8 (H) 0.0 - 5.6 %   Extra Tube    Narrative    The following orders were created for panel order Extra Tube.  Procedure                               Abnormality         Status                     ---------                               -----------         ------                     Extra Green Top (Lithium...[[id number]]                      In process                   Please view results for these tests on the individual orders.       ASSESSMENT / PLAN:   Rosalba was seen today for annual visit and diabetes.    Diagnoses and all orders for this visit:    Encounter for Medicare annual wellness exam  Routine history and physical, updated in EMR.  Declined mammogram colonoscopy and bone density scan. Would like to get COVID-vaccine.  She declines shingles and RSV and fluor vaccination against respiratory syncytial virus    Need for shingles vaccine  Need for prophylactic vaccination against hepatitis B virus  See above.    Screen for colon cancer  Declines cologuard and colonoscopy    Type 2 diabetes mellitus with diabetic polyneuropathy, without long-term current use of insulin (H)  A1c has improved to 5.8.  Diet and exercise controlled.  Not on any medication.  -     HEMOGLOBIN A1C; Future  -     Lipid panel reflex to direct LDL Non-fasting  -     Albumin Random Urine Quantitative with Creat Ratio; Future  -     HEMOGLOBIN A1C  -     CBC with platelets    Essential hypertension  Controlled.  On amlodipine and hydrochlorothiazide and  "losartan  -     BASIC METABOLIC PANEL  -     Discontinue: losartan (COZAAR) 50 MG tablet; Take 2 tablets (100 mg) by mouth daily  -     losartan (COZAAR) 50 MG tablet; Take 2 tablets (100 mg) by mouth daily    Genital herpes simplex, unspecified site  On Valtrex chronically.    Schizophrenia, chronic condition (H)  Sees surgery at [facility].    Hypercholesteremia  Pravastatin.  Lipid check today    Morbid obesity (H)  Has been changing her diet.  Hard to walk due to arthritic pain.    Decreased GFR  Cmp done today    Chronic nasal discharge  Nasal discharge  Has seen ent before. Nasal sprays did not help previously. She thought it was rhinophyma but explained to patient she doesn't have rhinophyma which is usually associated with rosacea and on external nose structure.   -     Adult ENT  Referral; Future    SOB (shortness of breath)  Orthopnea  Chronic for years. Doesn't want sleep study. Will do baseline work up. Consider echo. Denies chest pain.   -     XR Chest 2 Views; Future  -     N terminal pro BNP outpatient    Other orders  -     REVIEW OF HEALTH MAINTENANCE PROTOCOL ORDERS  -     Extra Tube; Future  -     Extra Tube  -     PRIMARY CARE FOLLOW-UP SCHEDULING; Future  -     COVID-19 12+ (2023-24) (PFIZER)        Patient has been advised of split billing requirements and indicates understanding: Yes      COUNSELING:  Reviewed preventive health counseling, as reflected in patient instructions       Regular exercise       Healthy diet/nutrition      BMI:   Estimated body mass index is 50.06 kg/m  as calculated from the following:    Height as of this encounter: 1.52 m (4' 11.84\").    Weight as of this encounter: 115.7 kg (255 lb).   Weight management plan: Discussed healthy diet and exercise guidelines      She reports that she has quit smoking. Her smoking use included cigarettes. She has a 28.00 pack-year smoking history. She has never used smokeless tobacco.      Appropriate preventive services " were discussed with this patient, including applicable screening as appropriate for fall prevention, nutrition, physical activity, Tobacco-use cessation, weight loss and cognition.  Checklist reviewing preventive services available has been given to the patient.    Reviewed patients plan of care and provided an AVS. The Basic Care Plan (routine screening as documented in Health Maintenance) for Rosalba meets the Care Plan requirement. This Care Plan has been established and reviewed with the Patient.          Xenia Patel MD  [facility]    Identified Health Risks:  I have reviewed Opioid Use Disorder and Substance Use Disorder risk factors and made any needed referrals.

## 2023-10-18 ENCOUNTER — TELEPHONE (OUTPATIENT)
Dept: FAMILY MEDICINE | Facility: CLINIC | Age: 68
End: 2023-10-18
Payer: MEDICARE

## 2023-10-18 NOTE — TELEPHONE ENCOUNTER
Spoke with patient who is agreeable with the plan. Also would like Dr. Patel to know that she was unable to get into ENT until May.    ----- Message from Xenia Patel MD sent at 10/17/2023 10:33 PM CDT -----  Please call pt: Chest XR is unremarkable, no pneumonia seen. Lab all unremarkable. Let's monitor her symptoms of shortness of breath for now if worsening please let me know to see her again in clinic and further work up can be done then. Let me know if she has question.

## 2023-11-30 DIAGNOSIS — E78.00 HYPERCHOLESTEREMIA: ICD-10-CM

## 2023-11-30 DIAGNOSIS — I10 ESSENTIAL HYPERTENSION: ICD-10-CM

## 2023-11-30 RX ORDER — HYDROCHLOROTHIAZIDE 25 MG/1
25 TABLET ORAL DAILY
Qty: 90 TABLET | Refills: 0 | Status: SHIPPED | OUTPATIENT
Start: 2023-11-30 | End: 2024-02-23

## 2023-11-30 RX ORDER — PRAVASTATIN SODIUM 80 MG/1
TABLET ORAL
Qty: 90 TABLET | Refills: 2 | Status: SHIPPED | OUTPATIENT
Start: 2023-11-30 | End: 2024-09-11

## 2023-12-02 DIAGNOSIS — I10 ESSENTIAL HYPERTENSION: ICD-10-CM

## 2023-12-05 RX ORDER — AMLODIPINE BESYLATE 5 MG/1
5 TABLET ORAL DAILY
Qty: 90 TABLET | Refills: 0 | Status: SHIPPED | OUTPATIENT
Start: 2023-12-05 | End: 2024-03-01

## 2023-12-06 ENCOUNTER — PATIENT OUTREACH (OUTPATIENT)
Dept: GASTROENTEROLOGY | Facility: CLINIC | Age: 68
End: 2023-12-06
Payer: MEDICARE

## 2023-12-08 ENCOUNTER — TRANSFERRED RECORDS (OUTPATIENT)
Dept: HEALTH INFORMATION MANAGEMENT | Facility: CLINIC | Age: 68
End: 2023-12-08
Payer: MEDICARE

## 2023-12-18 ENCOUNTER — OFFICE VISIT (OUTPATIENT)
Dept: FAMILY MEDICINE | Facility: CLINIC | Age: 68
End: 2023-12-18
Payer: MEDICARE

## 2023-12-18 VITALS
RESPIRATION RATE: 20 BRPM | WEIGHT: 253.8 LBS | DIASTOLIC BLOOD PRESSURE: 80 MMHG | OXYGEN SATURATION: 98 % | SYSTOLIC BLOOD PRESSURE: 138 MMHG | BODY MASS INDEX: 47.92 KG/M2 | HEIGHT: 61 IN | HEART RATE: 76 BPM | TEMPERATURE: 98.3 F

## 2023-12-18 DIAGNOSIS — R31.0 GROSS HEMATURIA: ICD-10-CM

## 2023-12-18 DIAGNOSIS — R30.0 DYSURIA: Primary | ICD-10-CM

## 2023-12-18 DIAGNOSIS — A60.00 GENITAL HERPES SIMPLEX, UNSPECIFIED SITE: ICD-10-CM

## 2023-12-18 LAB
ALBUMIN UR-MCNC: NEGATIVE MG/DL
APPEARANCE UR: ABNORMAL
BACTERIA #/AREA URNS HPF: ABNORMAL /HPF
BILIRUB UR QL STRIP: NEGATIVE
COLOR UR AUTO: YELLOW
GLUCOSE UR STRIP-MCNC: NEGATIVE MG/DL
HGB UR QL STRIP: ABNORMAL
KETONES UR STRIP-MCNC: NEGATIVE MG/DL
LEUKOCYTE ESTERASE UR QL STRIP: ABNORMAL
NITRATE UR QL: NEGATIVE
PH UR STRIP: 7 [PH] (ref 5–8)
RBC #/AREA URNS AUTO: ABNORMAL /HPF
SP GR UR STRIP: 1.01 (ref 1–1.03)
SQUAMOUS #/AREA URNS AUTO: ABNORMAL /LPF
UROBILINOGEN UR STRIP-ACNC: 0.2 E.U./DL
WBC #/AREA URNS AUTO: >100 /HPF
WBC CLUMPS #/AREA URNS HPF: PRESENT /HPF

## 2023-12-18 PROCEDURE — 87086 URINE CULTURE/COLONY COUNT: CPT

## 2023-12-18 PROCEDURE — 87186 SC STD MICRODIL/AGAR DIL: CPT

## 2023-12-18 PROCEDURE — 81001 URINALYSIS AUTO W/SCOPE: CPT

## 2023-12-18 PROCEDURE — 99214 OFFICE O/P EST MOD 30 MIN: CPT

## 2023-12-18 RX ORDER — PHENAZOPYRIDINE HYDROCHLORIDE 200 MG/1
200 TABLET, FILM COATED ORAL 3 TIMES DAILY PRN
Qty: 6 TABLET | Refills: 0 | Status: SHIPPED | OUTPATIENT
Start: 2023-12-18

## 2023-12-18 RX ORDER — RESPIRATORY SYNCYTIAL VIRUS VACCINE 120MCG/0.5
0.5 KIT INTRAMUSCULAR ONCE
Qty: 1 EACH | Refills: 0 | Status: CANCELLED | OUTPATIENT
Start: 2023-12-18 | End: 2023-12-18

## 2023-12-18 ASSESSMENT — ENCOUNTER SYMPTOMS
CHILLS: 0
FEVER: 0
DYSURIA: 1
DIAPHORESIS: 0

## 2023-12-18 NOTE — PROGRESS NOTES
Assessment & Plan     Dysuria  Gross hematuria  I cannot see the urine culture from 11-1-2023.  UA showed microscopic red blood cells, white blood cells and bacteria.  Microscopic showed leukocyte esterase, large amount of occult blood, protein, and miky color.  Treated with Keflex 500 mg, twice a day for 7 days.    Urinalysis microscopically from UA on 11- shows red blood cells, white blood cells and bacteria again.  Macroscopic shows protein, occult blood, leukocyte esterase, and a pink color.  Culture was negative.  Treated with cefadroxil 500 mg twice a day for 7 days.    Urinalysis on 12/8/2023 microscopically showed red blood cells, white blood cells, and bacteria.  Macroscopic was cloudy had protein, large amounts of blood and small amounts of leukocyte esterase. Culture was negative. Treated with Bactrim double strength 1 tablet twice a day for 7 days.    Urine today is similar, proteins have resolved, but blood is still present as well as leukocyte esterase. Microscopically bacteria, red blood cells, and WBC are seen.     Urine did run to culture, we will wait this.  Since she has had 3 rounds of appropriate antibiotics for UTI with no positive culture I would like to hold off on treating this with an antibiotic.  Patient is still having dysuria.  Could be urine is touching a genital herpes lesion and causing pain, the blood in the urine could be from irritation of this lesion.  Patient declines pelvic exam today.  I will give her Pyridium, but I discussed that my biggest recommendation is she see urology.  I discussed that this medication will alter her urine results and she should not take it prior to seeing them.  She verbalized understanding but would like something for the pain.  If this helps with the pain, could be interstitial cystitis or other urological cause of pain.  If this is not beneficial I would recommend her allow someone to do a pelvic exam to see if there is a herpetic lesion  causing the symptoms.  She reports she will go to urology to figure out what is going on. No other questions or concerns at this time. If culture is positive I will call her to start her on therapies.   - UA Macroscopic with reflex to Microscopic and Culture - Clinic Collect  - Urine Microscopic Exam  - Urine Culture  - phenazopyridine (PYRIDIUM) 200 MG tablet; Take 1 tablet (200 mg) by mouth 3 times daily as needed for irritation or pain  - Adult Urology  Referral; Future    Genital herpes simplex, unspecified site  Has a history of genital herpes, declines  exam today.  She does tell me she thinks there is a lesion present.  She has been taking her suppressive therapy as prescribed.  While it has been sometime since this flareup started, I do think patient could try 1000 mg daily for 5 days.  We did discuss that this will not necessarily clear things up but may help shorten the duration of this flare.  I again emphasized the patient that I cannot confirm there is a flare without a physical exam, she declines and reports that she knows there is a lesion there.  The lesion on the labia did start when all of the other symptoms started.    Carlos Navarrete is a 68 year old, presenting for the following health issues:  UTI        12/18/2023    12:36 PM   Additional Questions   Roomed by Gloria SWAN CMA   Accompanied by Self         12/18/2023    12:36 PM   Patient Reported Additional Medications   Patient reports taking the following new medications na       UTI  Pertinent negatives include no chills, diaphoresis or fever.      Patient is here today for pain with urination and a history of frequent urinary tract infections.  She tells me she still is having a urinary tract infection.  When I try to clarify what is happening with urinary tract infection she says she has pain at the end of voiding.  No new back pain, no fevers.  No pelvic pain.  She does have a history of a hernia.  She has a history of genital  "ulcers.  This is from herpes.  She is taking suppressive therapy with Valtrex daily.  She has noticed a lesion on the labia.  This lesion presented when all symptoms started back in November.  She does rub the area as it is itchy a lot. She is trying to avoid getting urine on the area, but it is difficult. She would like another antibiotic and something for pain today.     Genitourinary - Female  Onset/Duration: around 1 1/2 months  Description:   Painful urination (Dysuria): YES-- towards the end per pt           Frequency: YES  Blood in urine (Hematuria): YES  Delay in urine (Hesitency): No  Intensity: severe  Progression of Symptoms:  same  Accompanying Signs & Symptoms:  Fever/chills: No  Flank pain: No  Nausea and vomiting: No  Vaginal symptoms: odor, itching, and dyspareunia (pain in labia--has herpes)  Abdominal/Pelvic Pain: No  History:   History of frequent UTI s: YES  History of kidney stones: No  Sexually Active: No  Possibility of pregnancy: No  Precipitating or alleviating factors: None  Therapies tried and outcome: antibiotics    Review of Systems   Constitutional:  Negative for chills, diaphoresis and fever.   Genitourinary:  Positive for dysuria and vaginal pain.            Objective    /80 (BP Location: Right arm, Patient Position: Sitting, Cuff Size: Adult Large)   Pulse 76   Temp 98.3  F (36.8  C) (Oral)   Resp 20   Ht 1.549 m (5' 1\")   Wt 115.1 kg (253 lb 12.8 oz)   LMP  (LMP Unknown)   SpO2 98%   Breastfeeding No   BMI 47.96 kg/m    Body mass index is 47.96 kg/m .  Physical Exam  Constitutional:       General: She is not in acute distress.  Genitourinary:     Comments: Patient declines  exam today.   Neurological:      General: No focal deficit present.      Mental Status: She is alert.   Psychiatric:         Mood and Affect: Mood normal.         Thought Content: Thought content normal.        At the end of the visit, I confirmed understanding of what was discussed. Rosalba has " no further questions or concerns that were brought up at this time.     I spent a total of 30 minutes on the day of the visit.   Time spent by me doing chart review, history and exam, documentation and further activities per the note     Sophia Cho DNP, APRN, FNP-C

## 2023-12-19 LAB — BACTERIA UR CULT: ABNORMAL

## 2023-12-21 ENCOUNTER — TELEPHONE (OUTPATIENT)
Dept: FAMILY MEDICINE | Facility: CLINIC | Age: 68
End: 2023-12-21
Payer: MEDICARE

## 2023-12-21 DIAGNOSIS — N30.01 ACUTE CYSTITIS WITH HEMATURIA: Primary | ICD-10-CM

## 2023-12-21 RX ORDER — CIPROFLOXACIN 500 MG/1
500 TABLET, FILM COATED ORAL 2 TIMES DAILY
Qty: 10 TABLET | Refills: 0 | Status: SHIPPED | OUTPATIENT
Start: 2023-12-21 | End: 2023-12-26

## 2023-12-21 NOTE — TELEPHONE ENCOUNTER
----- Message from FRANCIS Arnold CNP sent at 12/21/2023 10:17 AM CST -----  Please call,     Her urine culture this time actually did come back positive for E. Coli. The counts are lower than the typical threshold for a UTI, but because of your symptoms I want to treat ou with Ciprofloxacin. She should take 1 tablet, twice a day, for 5 days.     Please do not hesitate to reach out with any questions or concerns,    Sophia Cho DNP, FRANCIS, FNP-C

## 2024-01-03 ENCOUNTER — TRANSFERRED RECORDS (OUTPATIENT)
Dept: HEALTH INFORMATION MANAGEMENT | Facility: CLINIC | Age: 69
End: 2024-01-03
Payer: MEDICARE

## 2024-01-03 LAB — RETINOPATHY: NEGATIVE

## 2024-02-23 DIAGNOSIS — I10 ESSENTIAL HYPERTENSION: ICD-10-CM

## 2024-02-23 RX ORDER — HYDROCHLOROTHIAZIDE 25 MG/1
25 TABLET ORAL DAILY
Qty: 90 TABLET | Refills: 2 | Status: SHIPPED | OUTPATIENT
Start: 2024-02-23

## 2024-03-01 DIAGNOSIS — I10 ESSENTIAL HYPERTENSION: ICD-10-CM

## 2024-03-01 RX ORDER — AMLODIPINE BESYLATE 5 MG/1
5 TABLET ORAL DAILY
Qty: 90 TABLET | Refills: 0 | Status: SHIPPED | OUTPATIENT
Start: 2024-03-01 | End: 2024-05-24

## 2024-04-05 ENCOUNTER — OFFICE VISIT (OUTPATIENT)
Dept: FAMILY MEDICINE | Facility: CLINIC | Age: 69
End: 2024-04-05
Payer: MEDICARE

## 2024-04-05 VITALS
OXYGEN SATURATION: 94 % | DIASTOLIC BLOOD PRESSURE: 73 MMHG | BODY MASS INDEX: 48.79 KG/M2 | HEART RATE: 82 BPM | TEMPERATURE: 98.1 F | HEIGHT: 61 IN | WEIGHT: 258.4 LBS | SYSTOLIC BLOOD PRESSURE: 132 MMHG

## 2024-04-05 DIAGNOSIS — Z12.11 SCREEN FOR COLON CANCER: ICD-10-CM

## 2024-04-05 DIAGNOSIS — E66.01 MORBID OBESITY (H): ICD-10-CM

## 2024-04-05 DIAGNOSIS — F20.9 SCHIZOPHRENIA, CHRONIC CONDITION (H): ICD-10-CM

## 2024-04-05 DIAGNOSIS — R94.4 DECREASED GFR: ICD-10-CM

## 2024-04-05 DIAGNOSIS — E11.42 TYPE 2 DIABETES MELLITUS WITH DIABETIC POLYNEUROPATHY, WITHOUT LONG-TERM CURRENT USE OF INSULIN (H): Primary | ICD-10-CM

## 2024-04-05 DIAGNOSIS — E78.00 HYPERCHOLESTEREMIA: ICD-10-CM

## 2024-04-05 DIAGNOSIS — I10 ESSENTIAL HYPERTENSION: ICD-10-CM

## 2024-04-05 PROCEDURE — 80061 LIPID PANEL: CPT | Performed by: FAMILY MEDICINE

## 2024-04-05 PROCEDURE — 99213 OFFICE O/P EST LOW 20 MIN: CPT | Performed by: FAMILY MEDICINE

## 2024-04-05 PROCEDURE — 36415 COLL VENOUS BLD VENIPUNCTURE: CPT | Performed by: FAMILY MEDICINE

## 2024-04-05 PROCEDURE — 80069 RENAL FUNCTION PANEL: CPT | Performed by: FAMILY MEDICINE

## 2024-04-05 PROCEDURE — G2211 COMPLEX E/M VISIT ADD ON: HCPCS | Performed by: FAMILY MEDICINE

## 2024-04-05 NOTE — PROGRESS NOTES
"  Assessment & Plan     Type 2 diabetes mellitus with diabetic polyneuropathy, without long-term current use of insulin (H)  Review A1c done through [facility].  a1c at 6.4.  Patient admits to eating a lot of sweets and not walking as much.  Discussed with patient a low sugar low carbohydrate diet and increasing exercise.  She says she will try.  Follow-up in 3 to 4 months.  Patient agreed with plan.  She does not want to take any medication currently for diabetes    Essential hypertension  Well controlled    Schizophrenia, chronic condition (H)  Sees psych [facility]    Morbid obesity (H)  Discussed diet/exericse.     Hypercholesteremia  Due for recheck  - Lipid panel  - Lipid panel    Decreased GFR  Due for recheck  - Renal panel (Alb, BUN, Ca, Cl, CO2, Creat, Gluc, Phos, K, Na)  - Renal panel (Alb, BUN, Ca, Cl, CO2, Creat, Gluc, Phos, K, Na)    Screen for colon cancer  Declines.               BMI  Estimated body mass index is 48.82 kg/m  as calculated from the following:    Height as of this encounter: 1.549 m (5' 1\").    Weight as of this encounter: 117.2 kg (258 lb 6.4 oz).         The longitudinal plan of care for the diagnosis(es)/condition(s) as documented were addressed during this visit. Due to the added complexity in care, I will continue to support Rosalba in the subsequent management and with ongoing continuity of care.      Subjective   Rosalba is a 68 year old, presenting for the following health issues:  Diabetes (Hgb A1c 6.4 on 3/6/24)        4/5/2024     1:00 PM   Additional Questions   Roomed by Zulay GARCIA LPN     HPI     Chief Complaint   Patient presents with    Diabetes     Hgb A1c 6.4 on 3/6/24       Diabetes Follow-up    How often are you checking your blood sugar? Not at all  What concerns do you have today about your diabetes? None   Do you have any of these symptoms? (Select all that apply)  No numbness or tingling in feet.  No redness, sores or blisters on feet.  No complaints of " "excessive thirst.  No reports of blurry vision.  No significant changes to weight.      BP Readings from Last 2 Encounters:   04/05/24 132/73   12/18/23 138/80     Hemoglobin A1C (%)   Date Value   10/12/2023 5.8 (H)   10/06/2022 6.1 (H)     LDL Cholesterol Calculated (mg/dL)   Date Value   10/12/2023 84   10/06/2022 59                 Review of Systems  Constitutional, neuro, ENT, endocrine, pulmonary, cardiac, gastrointestinal, genitourinary, musculoskeletal, integument and psychiatric systems are negative, except as otherwise noted.      Objective    /73   Pulse 82   Temp 98.1  F (36.7  C) (Oral)   Ht 1.549 m (5' 1\")   Wt 117.2 kg (258 lb 6.4 oz)   LMP  (LMP Unknown)   SpO2 94%   BMI 48.82 kg/m    Body mass index is 48.82 kg/m .  Physical Exam   GENERAL: alert and no distress  NECK: no adenopathy, no asymmetry, masses, or scars  RESP: lungs clear to auscultation - no rales, rhonchi or wheezes  CV: regular rate and rhythm, normal S1 S2, no S3 or S4, no murmur, click or rub  MS: no gross musculoskeletal defects noted, no edema  NEURO: Normal strength and tone, mentation intact and speech normal  PSYCH: mentation appears normal, affect normal/bright    No results found for this or any previous visit (from the past 24 hour(s)).        Signed Electronically by: Xenia Patel MD    "

## 2024-04-06 LAB
ALBUMIN SERPL BCG-MCNC: 4.3 G/DL (ref 3.5–5.2)
ANION GAP SERPL CALCULATED.3IONS-SCNC: 11 MMOL/L (ref 7–15)
BUN SERPL-MCNC: 14.9 MG/DL (ref 8–23)
CALCIUM SERPL-MCNC: 9.6 MG/DL (ref 8.8–10.2)
CHLORIDE SERPL-SCNC: 97 MMOL/L (ref 98–107)
CHOLEST SERPL-MCNC: 140 MG/DL
CREAT SERPL-MCNC: 1.06 MG/DL (ref 0.51–0.95)
DEPRECATED HCO3 PLAS-SCNC: 26 MMOL/L (ref 22–29)
EGFRCR SERPLBLD CKD-EPI 2021: 57 ML/MIN/1.73M2
FASTING STATUS PATIENT QL REPORTED: NO
GLUCOSE SERPL-MCNC: 113 MG/DL (ref 70–99)
HDLC SERPL-MCNC: 41 MG/DL
LDLC SERPL CALC-MCNC: 68 MG/DL
NONHDLC SERPL-MCNC: 99 MG/DL
PHOSPHATE SERPL-MCNC: 4.3 MG/DL (ref 2.5–4.5)
POTASSIUM SERPL-SCNC: 4 MMOL/L (ref 3.4–5.3)
SODIUM SERPL-SCNC: 134 MMOL/L (ref 135–145)
TRIGL SERPL-MCNC: 153 MG/DL

## 2024-04-09 ENCOUNTER — TELEPHONE (OUTPATIENT)
Dept: FAMILY MEDICINE | Facility: CLINIC | Age: 69
End: 2024-04-09
Payer: MEDICARE

## 2024-04-09 NOTE — LETTER
April 9, 2024      Rosalba D Gabe  [address]    [facility] [address]        Dear ,    We are writing to inform you of your test results.    Your cholesterol looks fine, continue with current cholesterol medication. Kidney function is mildly low but similar to before. It looks like you were a little bit dehydrated when lab was checked. Dr. Patel says to stay well hydrated, and she will recheck these labs in 6 months.        Resulted Orders   Lipid panel   Result Value Ref Range    Cholesterol 140 <200 mg/dL    Triglycerides 153 (H) <150 mg/dL    Direct Measure HDL 41 (L) >=50 mg/dL    LDL Cholesterol Calculated 68 <=100 mg/dL    Non HDL Cholesterol 99 <130 mg/dL    Patient Fasting > 8hrs? No     Narrative    Cholesterol  Desirable:  <200 mg/dL    Triglycerides  Normal:  Less than 150 mg/dL  Borderline High:  150-199 mg/dL  High:  200-499 mg/dL  Very High:  Greater than or equal to 500 mg/dL    Direct Measure HDL  Female:  Greater than or equal to 50 mg/dL   Male:  Greater than or equal to 40 mg/dL    LDL Cholesterol  Desirable:  <100mg/dL  Above Desirable:  100-129 mg/dL   Borderline High:  130-159 mg/dL   High:  160-189 mg/dL   Very High:  >= 190 mg/dL    Non HDL Cholesterol  Desirable:  130 mg/dL  Above Desirable:  130-159 mg/dL  Borderline High:  160-189 mg/dL  High:  190-219 mg/dL  Very High:  Greater than or equal to 220 mg/dL   Renal panel (Alb, BUN, Ca, Cl, CO2, Creat, Gluc, Phos, K, Na)   Result Value Ref Range    Sodium 134 (L) 135 - 145 mmol/L      Comment:      Reference intervals for this test were updated on 09/26/2023 to more accurately reflect our healthy population. There may be differences in the flagging of prior results with similar values performed with this method. Interpretation of those prior results can be made in the context of the updated reference intervals.     Potassium 4.0 3.4 - 5.3 mmol/L    Chloride 97 (L) 98 - 107 mmol/L    Carbon Dioxide (CO2) 26 22 - 29  mmol/L    Anion Gap 11 7 - 15 mmol/L    Glucose 113 (H) 70 - 99 mg/dL    Urea Nitrogen 14.9 8.0 - 23.0 mg/dL    Creatinine 1.06 (H) 0.51 - 0.95 mg/dL    GFR Estimate 57 (L) >60 mL/min/1.73m2    Calcium 9.6 8.8 - 10.2 mg/dL    Albumin 4.3 3.5 - 5.2 g/dL    Phosphorus 4.3 2.5 - 4.5 mg/dL       If you have any questions or concerns, please call the clinic at the number listed above.       Sincerely,    Dr. Patel

## 2024-04-09 NOTE — TELEPHONE ENCOUNTER
----- Message from Xenia Patel MD sent at 4/8/2024  7:03 PM CDT -----  Please call pt: her cholesterol looks fine, continue with current cholesterol medication. Kidney function is mildly low but similar to before, it looks like she was a little bit dehydrated when lab was checked, I advise her to stay well hydrated, I'll recheck these labs in 6 months.

## 2024-05-10 ENCOUNTER — HOSPITAL ENCOUNTER (OUTPATIENT)
Dept: CT IMAGING | Facility: HOSPITAL | Age: 69
Discharge: HOME | End: 2024-05-10
Attending: OTOLARYNGOLOGY | Admitting: OTOLARYNGOLOGY
Payer: MEDICARE

## 2024-05-10 ENCOUNTER — OFFICE VISIT (OUTPATIENT)
Dept: OTOLARYNGOLOGY | Facility: CLINIC | Age: 69
End: 2024-05-10
Payer: MEDICARE

## 2024-05-10 DIAGNOSIS — J34.89 NASAL DISCHARGE: ICD-10-CM

## 2024-05-10 DIAGNOSIS — J34.89 NASAL CRUSTING: Primary | ICD-10-CM

## 2024-05-10 DIAGNOSIS — J34.89 CHRONIC NASAL DISCHARGE: ICD-10-CM

## 2024-05-10 PROCEDURE — 70486 CT MAXILLOFACIAL W/O DYE: CPT | Mod: MG

## 2024-05-10 PROCEDURE — 99203 OFFICE O/P NEW LOW 30 MIN: CPT | Performed by: OTOLARYNGOLOGY

## 2024-05-10 RX ORDER — MUPIROCIN 20 MG/G
OINTMENT TOPICAL
Qty: 22 G | Refills: 0 | Status: SHIPPED | OUTPATIENT
Start: 2024-05-10

## 2024-05-10 NOTE — PROGRESS NOTES
CHIEF COMPLAINT: Patient presents with:  Consult: Chronic Nasal discharge, had 2 sores inside of nose would pick the scab to help with breathing, breathing is getting better but continues to have sore in nose, was on antibiotics for a UTI about 6 months ago and sores had cleared but then returned, primary tried her on Nasal spray and it caused a lot of irritation , SNOT Total 35         HISTORY OF PRESENT ILLNESS    Rosalba was seen at the behest of [facility] for nose/sinus concern.    REFERRAL NOTE:      Wants to see ENT for chronic nasal discharge and irritated mucosa.      REVIEW OF SYSTEMS    Review of Systems as per HPI and PMHx, otherwise 10 system review system are negative.       ALLERGIES    Cat dander [animal dander]    CURRENT MEDICATIONS      Current Outpatient Medications:     mupirocin (BACTROBAN) 2 % external ointment, Apply a pea sized amount to inside of each nostril 3x daily with Qtip for 14 days, Disp: 22 g, Rfl: 0    amLODIPine (NORVASC) 5 MG tablet, Take 1 tablet (5 mg) by mouth daily, Disp: 90 tablet, Rfl: 0    azelastine (ASTELIN) 0.1 % nasal spray, Spray 1 spray into both nostrils 2 times daily (Patient not taking: Reported on 12/18/2023), Disp: 30 mL, Rfl: 1    diphenhydrAMINE (BENADRYL) 25 mg capsule, [DIPHENHYDRAMINE (BENADRYL) 25 MG CAPSULE] Take 25 mg by mouth at bedtime.       , Disp: , Rfl:     fexofenadine (ALLEGRA) 60 MG tablet, [FEXOFENADINE (ALLEGRA) 60 MG TABLET] Take 60 mg by mouth daily., Disp: , Rfl:     fish oil-omega-3 fatty acids 1000 MG capsule, Take 2 capsules by mouth, Disp: , Rfl:     hydrochlorothiazide (HYDRODIURIL) 25 MG tablet, Take 1 tablet (25 mg) by mouth daily, Disp: 90 tablet, Rfl: 2    ibuprofen (ADVIL) 200 MG tablet, [IBUPROFEN (ADVIL) 200 MG TABLET] Take 200 mg by mouth every 6 (six) hours as needed for pain., Disp: , Rfl:     latanoprost (XALATAN) 0.005 % ophthalmic solution, instill 1 drop into Both Eyes every evening, Disp: , Rfl:     losartan  (COZAAR) 50 MG tablet, Take 2 tablets (100 mg) by mouth daily, Disp: 180 tablet, Rfl: 3    naproxen sodium (ALEVE) 220 MG tablet, [NAPROXEN SODIUM (ALEVE) 220 MG TABLET] Take 220 mg by mouth 2 (two) times a day with meals., Disp: , Rfl:     phenazopyridine (PYRIDIUM) 200 MG tablet, Take 1 tablet (200 mg) by mouth 3 times daily as needed for irritation or pain, Disp: 6 tablet, Rfl: 0    pravastatin (PRAVACHOL) 80 MG tablet, TAKE 1 TABLET (80 MG) BY MOUTH ONCE DAILY, Disp: 90 tablet, Rfl: 2    risperiDONE (RISPERDAL) 4 MG tablet, Take 4 mg by mouth daily , Disp: , Rfl:     timolol maleate (TIMOPTIC) 0.5 % ophthalmic solution, Instill 1 drop in Both Eyes twice a day, Disp: , Rfl:     valACYclovir (VALTREX) 500 MG tablet, Take 1 tablet (500 mg) by mouth once daily, Disp: 90 tablet, Rfl: 3     PAST MEDICAL HISTORY    PAST MEDICAL HISTORY:   Past Medical History:   Diagnosis Date    Diabetes mellitus (H)        PAST SURGICAL HISTORY    PAST SURGICAL HISTORY:   Past Surgical History:   Procedure Laterality Date    HC REMOVAL OF TONSILS,<14 Y/O      Description: Tonsillectomy;  Recorded: 11/19/2007;    HYSTERECTOMY      when she was 27 years old.     [facility] TOTAL ABDOM HYSTERECTOMY      Description: Total Abdominal Hysterectomy;  Recorded: 11/19/2007;  Comments: no oophorectomy       FAMILY  HISTORY    FAMILY HISTORY:   Family History   Problem Relation Age of Onset    Cancer Mother         pancreas    Diabetes Father     Hypertension Father     Dementia Father     Breast Cancer Cousin         in her 40 s ???    Asthma Brother        SOCIAL HISTORY    SOCIAL HISTORY:   Social History     Tobacco Use    Smoking status: Former     Current packs/day: 1.00     Average packs/day: 1 pack/day for 28.0 years (28.0 ttl pk-yrs)     Types: Cigarettes     Passive exposure: Never    Smokeless tobacco: Never    Tobacco comments:     quit apx 35 yrs ago   Substance Use Topics    Alcohol use: Yes     Comment: Alcoholic Drinks/day: a beer  every 4 months        PHYSICAL EXAM    HEAD: Normal appearance and symmetry:  No cutaneous lesions.      NECK:  supple     EARS:    Right:   TM intact nl    LEFT:   TM intact nl     EYES:  EOMI    CN VII/XII:  intact     NOSE:     Dorsum:   straight  Septum:  deviated left  Mucosa:  moist; no lesions or crusting noted          ORAL CAVITY/OROPHARYNX:     Lips:  Normal.  Tongue: normal, midline  Mucosa:   no lesions     NECK:  Trachea:  midline.              Thyroid:  normal              Adenopathy:  none        NEURO:   Alert and Oriented     GAIT AND STATION:  normal     RESPIRATORY:   Symmetry and Respiratory effort     PSYCH:  Normal mood and affect     SKIN:   warm and dry         IMPRESSION:    Encounter Diagnoses   Name Primary?    Chronic nasal discharge     Nasal discharge     Nasal crusting Yes          RECOMMENDATIONS:      Orders Placed This Encounter   Procedures    CT Sinus w/o Contrast     Orders Placed This Encounter   Medications    mupirocin (BACTROBAN) 2 % external ointment     Sig: Apply a pea sized amount to inside of each nostril 3x daily with Qtip for 14 days     Dispense:  22 g     Refill:  0      Will call with CT results (patient prefers before 10 AM)

## 2024-05-10 NOTE — NURSING NOTE
Sino-Nasal Outcome Test (SNOT - 22)    1. Need to Blow Nose: (P) Severe  2. Nasal Blockage: (P) Severe  3. Sneezing: (P) Severe  4. Runny Nose: (P) Moderate  5. Cough: (P) Mild or slight  6. Post-nasal discharge: (P) None  7. Thick nasal discharge: (P) Bad as it can be  8. Ear fullness: (P) Severe  9. Dizziness: (P) None  10. Ear Pain: (P) None  11. Facial pain/pressure: (P) None  12. Decreased Sense of Smell/Taste: (P) None  13. Difficulty falling asleep: (P) None  14. Wake up at night: (P) None  15. Lack of a good night's sleep: (P) None  16. Wake up tired: (P) None  17. Fatigue: (P) Severe  18. Reduced Productivity: (P) None  19. Reduced Concentration: (P) None  20. Frustrated/restless/irritable: (P) None  21. Sad: (P) None  22. Embarrassed: (P) Bad as it can be    Total Score: (P) 35    COPYRIGHT 1996. [facility]

## 2024-05-10 NOTE — LETTER
5/10/2024         RE: Rosalba Bernal  [address]  [facility] [place] [facility] [address]        Dear Colleague,    Thank you for referring your patient, Rosalba Bernal, to the [facility]. Please see a copy of my visit note below.    CHIEF COMPLAINT: Patient presents with:  Consult: Chronic Nasal discharge, had 2 sores inside of nose would pick the scab to help with breathing, breathing is getting better but continues to have sore in nose, was on antibiotics for a UTI about 6 months ago and sores had cleared but then returned, primary tried her on Nasal spray and it caused a lot of irritation , SNOT Total 35         HISTORY OF PRESENT ILLNESS    [facility]sinus [facility].    REFERRAL NOTE:      Wants to see ENT for chronic nasal discharge and irritated mucosa.      REVIEW OF SYSTEMS    Review of Systems as per HPI and PMHx, otherwise 10 system review system are negative.       ALLERGIES    Cat dander [animal dander]    CURRENT MEDICATIONS      Current Outpatient Medications:      mupirocin (BACTROBAN) 2 % external ointment, Apply a pea sized amount to inside of each nostril 3x daily with Qtip for 14 days, Disp: 22 g, Rfl: 0     amLODIPine (NORVASC) 5 MG tablet, Take 1 tablet (5 mg) by mouth daily, Disp: 90 tablet, Rfl: 0     azelastine (ASTELIN) 0.1 % nasal spray, Spray 1 spray into both nostrils 2 times daily (Patient not taking: Reported on 12/18/2023), Disp: 30 mL, Rfl: 1     diphenhydrAMINE (BENADRYL) 25 mg capsule, [DIPHENHYDRAMINE (BENADRYL) 25 MG CAPSULE] Take 25 mg by mouth at bedtime.       , Disp: , Rfl:      fexofenadine (ALLEGRA) 60 MG tablet, [FEXOFENADINE (ALLEGRA) 60 MG TABLET] Take 60 mg by mouth daily., Disp: , Rfl:      fish oil-omega-3 fatty acids 1000 MG capsule, Take 2 capsules by mouth, Disp: , Rfl:      hydrochlorothiazide (HYDRODIURIL) 25 MG tablet, Take 1 tablet (25 mg) by mouth daily, Disp: 90 tablet, Rfl: 2      ibuprofen (ADVIL) 200 MG tablet, [IBUPROFEN (ADVIL) 200 MG TABLET] Take 200 mg by mouth every 6 (six) hours as needed for pain., Disp: , Rfl:      latanoprost (XALATAN) 0.005 % ophthalmic solution, instill 1 drop into Both Eyes every evening, Disp: , Rfl:      losartan (COZAAR) 50 MG tablet, Take 2 tablets (100 mg) by mouth daily, Disp: 180 tablet, Rfl: 3     naproxen sodium (ALEVE) 220 MG tablet, [NAPROXEN SODIUM (ALEVE) 220 MG TABLET] Take 220 mg by mouth 2 (two) times a day with meals., Disp: , Rfl:      phenazopyridine (PYRIDIUM) 200 MG tablet, Take 1 tablet (200 mg) by mouth 3 times daily as needed for irritation or pain, Disp: 6 tablet, Rfl: 0     pravastatin (PRAVACHOL) 80 MG tablet, TAKE 1 TABLET (80 MG) BY MOUTH ONCE DAILY, Disp: 90 tablet, Rfl: 2     risperiDONE (RISPERDAL) 4 MG tablet, Take 4 mg by mouth daily , Disp: , Rfl:      timolol maleate (TIMOPTIC) 0.5 % ophthalmic solution, Instill 1 drop in Both Eyes twice a day, Disp: , Rfl:      valACYclovir (VALTREX) 500 MG tablet, Take 1 tablet (500 mg) by mouth once daily, Disp: 90 tablet, Rfl: 3     PAST MEDICAL HISTORY    PAST MEDICAL HISTORY:   Past Medical History:   Diagnosis Date     Diabetes mellitus (H)        PAST SURGICAL HISTORY    PAST SURGICAL HISTORY:   Past Surgical History:   Procedure Laterality Date     HC REMOVAL OF TONSILS,<12 Y/O      Description: Tonsillectomy;  Recorded: 11/19/2007;     HYSTERECTOMY      when she was 27 years old.      [facility] TOTAL ABDOM HYSTERECTOMY      Description: Total Abdominal Hysterectomy;  Recorded: 11/19/2007;  Comments: no oophorectomy       FAMILY  HISTORY    FAMILY HISTORY:   Family History   Problem Relation Age of Onset     Cancer Mother         pancreas     Diabetes Father      Hypertension Father      Dementia Father      Breast Cancer Cousin         in her 40 s ???     Asthma Brother        SOCIAL HISTORY    SOCIAL HISTORY:   Social History     Tobacco Use     Smoking status: Former     Current  packs/day: 1.00     Average packs/day: 1 pack/day for 28.0 years (28.0 ttl pk-yrs)     Types: Cigarettes     Passive exposure: Never     Smokeless tobacco: Never     Tobacco comments:     quit apx 35 yrs ago   Substance Use Topics     Alcohol use: Yes     Comment: Alcoholic Drinks/day: a beer every 4 months        PHYSICAL EXAM    HEAD: Normal appearance and symmetry:  No cutaneous lesions.      NECK:  supple     EARS:    Right:   TM intact nl    LEFT:   TM intact nl     EYES:  EOMI    CN VII/XII:  intact     NOSE:     Dorsum:   straight  Septum:  deviated left  Mucosa:  moist; no lesions or crusting noted          ORAL CAVITY/OROPHARYNX:     Lips:  Normal.  Tongue: normal, midline  Mucosa:   no lesions     NECK:  Trachea:  midline.              Thyroid:  normal              Adenopathy:  none        NEURO:   Alert and Oriented     GAIT AND STATION:  normal     RESPIRATORY:   Symmetry and Respiratory effort     PSYCH:  Normal mood and affect     SKIN:   warm and dry         IMPRESSION:    Encounter Diagnoses   Name Primary?     Chronic nasal discharge      Nasal discharge      Nasal crusting Yes          RECOMMENDATIONS:      Orders Placed This Encounter   Procedures     CT Sinus w/o Contrast     Orders Placed This Encounter   Medications     mupirocin (BACTROBAN) 2 % external ointment     Sig: Apply a pea sized amount to inside of each nostril 3x daily with Qtip for 14 days     Dispense:  22 g     Refill:  0      Will call with CT results (patient prefers before 10 AM)      Again, thank you for allowing me to participate in the care of your patient.        Sincerely,        Dickson Ghosh MD

## 2024-05-22 DIAGNOSIS — A60.00 GENITAL HERPES SIMPLEX, UNSPECIFIED SITE: ICD-10-CM

## 2024-05-22 DIAGNOSIS — I10 ESSENTIAL HYPERTENSION: ICD-10-CM

## 2024-05-22 RX ORDER — VALACYCLOVIR HYDROCHLORIDE 500 MG/1
TABLET, FILM COATED ORAL
Qty: 90 TABLET | Refills: 2 | Status: SHIPPED | OUTPATIENT
Start: 2024-05-22

## 2024-05-24 RX ORDER — AMLODIPINE BESYLATE 5 MG/1
5 TABLET ORAL DAILY
Qty: 90 TABLET | Refills: 0 | Status: SHIPPED | OUTPATIENT
Start: 2024-05-24 | End: 2024-09-09

## 2024-05-28 ENCOUNTER — TELEPHONE (OUTPATIENT)
Dept: OTOLARYNGOLOGY | Facility: CLINIC | Age: 69
End: 2024-05-28
Payer: MEDICARE

## 2024-05-28 NOTE — TELEPHONE ENCOUNTER
[facility] Call Center    Phone Message    May a detailed message be left on voicemail: yes     Reason for Call: Other: Pt calling for results of recent CT scan. Please call. Thanks.     Action Taken: Other: ENT    Travel Screening: Not Applicable

## 2024-05-29 NOTE — TELEPHONE ENCOUNTER
Pt notified and verbalized understanding. She says that the mupirocin ointment is helping the nasal crusting a lot and that her symptoms have mostly resolved. She plans to follow up as needed.   Andree Lee RN on 5/29/2024 at 4:26 PM

## 2024-06-24 ENCOUNTER — TRANSFERRED RECORDS (OUTPATIENT)
Dept: HEALTH INFORMATION MANAGEMENT | Facility: CLINIC | Age: 69
End: 2024-06-24
Payer: MEDICARE

## 2024-06-24 LAB — RETINOPATHY: NEGATIVE

## 2024-08-15 DIAGNOSIS — I10 ESSENTIAL HYPERTENSION: ICD-10-CM

## 2024-08-15 RX ORDER — HYDROCHLOROTHIAZIDE 25 MG/1
25 TABLET ORAL DAILY
Qty: 90 TABLET | Refills: 0 | OUTPATIENT
Start: 2024-08-15

## 2024-09-08 DIAGNOSIS — I10 ESSENTIAL HYPERTENSION: ICD-10-CM

## 2024-09-09 RX ORDER — AMLODIPINE BESYLATE 5 MG/1
5 TABLET ORAL DAILY
Qty: 90 TABLET | Refills: 0 | Status: SHIPPED | OUTPATIENT
Start: 2024-09-09

## 2024-09-11 DIAGNOSIS — E78.00 HYPERCHOLESTEREMIA: ICD-10-CM

## 2024-09-11 RX ORDER — PRAVASTATIN SODIUM 80 MG/1
TABLET ORAL
Qty: 90 TABLET | Refills: 1 | Status: SHIPPED | OUTPATIENT
Start: 2024-09-11

## 2024-10-10 ENCOUNTER — OFFICE VISIT (OUTPATIENT)
Dept: FAMILY MEDICINE | Facility: CLINIC | Age: 69
End: 2024-10-10
Payer: MEDICARE

## 2024-10-10 VITALS
SYSTOLIC BLOOD PRESSURE: 142 MMHG | WEIGHT: 259.4 LBS | BODY MASS INDEX: 48.97 KG/M2 | DIASTOLIC BLOOD PRESSURE: 67 MMHG | OXYGEN SATURATION: 96 % | TEMPERATURE: 98.6 F | HEIGHT: 61 IN | HEART RATE: 87 BPM | RESPIRATION RATE: 14 BRPM

## 2024-10-10 DIAGNOSIS — E66.01 MORBID OBESITY (H): ICD-10-CM

## 2024-10-10 DIAGNOSIS — I10 ESSENTIAL HYPERTENSION: ICD-10-CM

## 2024-10-10 DIAGNOSIS — E11.42 TYPE 2 DIABETES MELLITUS WITH DIABETIC POLYNEUROPATHY, WITHOUT LONG-TERM CURRENT USE OF INSULIN (H): Primary | ICD-10-CM

## 2024-10-10 DIAGNOSIS — R94.4 DECREASED GFR: ICD-10-CM

## 2024-10-10 DIAGNOSIS — Z12.31 VISIT FOR SCREENING MAMMOGRAM: ICD-10-CM

## 2024-10-10 DIAGNOSIS — F20.9 SCHIZOPHRENIA, CHRONIC CONDITION (H): ICD-10-CM

## 2024-10-10 DIAGNOSIS — E78.00 HYPERCHOLESTEREMIA: ICD-10-CM

## 2024-10-10 LAB
EST. AVERAGE GLUCOSE BLD GHB EST-MCNC: 140 MG/DL
HBA1C MFR BLD: 6.5 % (ref 0–5.6)
HOLD SPECIMEN: NORMAL

## 2024-10-10 PROCEDURE — 82570 ASSAY OF URINE CREATININE: CPT | Performed by: FAMILY MEDICINE

## 2024-10-10 PROCEDURE — 80053 COMPREHEN METABOLIC PANEL: CPT | Performed by: FAMILY MEDICINE

## 2024-10-10 PROCEDURE — 80061 LIPID PANEL: CPT | Performed by: FAMILY MEDICINE

## 2024-10-10 PROCEDURE — 36415 COLL VENOUS BLD VENIPUNCTURE: CPT | Performed by: FAMILY MEDICINE

## 2024-10-10 PROCEDURE — 99214 OFFICE O/P EST MOD 30 MIN: CPT | Performed by: FAMILY MEDICINE

## 2024-10-10 PROCEDURE — 83036 HEMOGLOBIN GLYCOSYLATED A1C: CPT | Performed by: FAMILY MEDICINE

## 2024-10-10 PROCEDURE — G2211 COMPLEX E/M VISIT ADD ON: HCPCS | Performed by: FAMILY MEDICINE

## 2024-10-10 PROCEDURE — 82043 UR ALBUMIN QUANTITATIVE: CPT | Performed by: FAMILY MEDICINE

## 2024-10-10 RX ORDER — LOSARTAN POTASSIUM 50 MG/1
100 TABLET ORAL DAILY
Qty: 180 TABLET | Refills: 3 | Status: SHIPPED | OUTPATIENT
Start: 2024-10-10

## 2024-10-10 NOTE — LETTER
October 16, 2024      Rosalbajackelin Bernal  [address] [facility] [address]  [place] [address]        Dear ,    We are writing to inform you of your test results.    Resulted Orders   HEMOGLOBIN A1C   Result Value Ref Range    Estimated Average Glucose 140 (H) <117 mg/dL    Hemoglobin A1C 6.5 (H) 0.0 - 5.6 %      Comment:      Normal <5.7%   Prediabetes 5.7-6.4%    Diabetes 6.5% or higher     Note: Adopted from ADA consensus guidelines.   Comprehensive metabolic panel (BMP + Alb, Alk Phos, ALT, AST, Total. Bili, TP)   Result Value Ref Range    Sodium 137 135 - 145 mmol/L    Potassium 4.3 3.4 - 5.3 mmol/L    Carbon Dioxide (CO2) 28 22 - 29 mmol/L    Anion Gap 10 7 - 15 mmol/L    Urea Nitrogen 14.2 8.0 - 23.0 mg/dL    Creatinine 1.01 (H) 0.51 - 0.95 mg/dL    GFR Estimate 61 >60 mL/min/1.73m2      Comment:      eGFR calculated using 2021 CKD-EPI equation.    Calcium 10.0 8.8 - 10.4 mg/dL      Comment:      Reference intervals for this test were updated on 7/16/2024 to reflect our healthy population more accurately. There may be differences in the flagging of prior results with similar values performed with this method. Those prior results can be interpreted in the context of the updated reference intervals.    Chloride 99 98 - 107 mmol/L    Glucose 110 (H) 70 - 99 mg/dL    Alkaline Phosphatase 76 40 - 150 U/L    AST 27 0 - 45 U/L    ALT 27 0 - 50 U/L    Protein Total 7.1 6.4 - 8.3 g/dL    Albumin 4.2 3.5 - 5.2 g/dL    Bilirubin Total 0.4 <=1.2 mg/dL    Patient Fasting > 8hrs? No    Lipid panel   Result Value Ref Range    Cholesterol 151 <200 mg/dL    Triglycerides 157 (H) <150 mg/dL    Direct Measure HDL 38 (L) >=50 mg/dL    LDL Cholesterol Calculated 82 <100 mg/dL    Non HDL Cholesterol 113 <130 mg/dL    Patient Fasting > 8hrs? No     Narrative    Cholesterol  Desirable: < 200 mg/dL  Borderline High: 200 - 239 mg/dL  High: >= 240 mg/dL    Triglycerides  Normal: < 150 mg/dL  Borderline High: 150 - 199 mg/dL  High:  200-499 mg/dL  Very High: >= 500 mg/dL    Direct Measure HDL  Female: >= 50 mg/dL   Male: >= 40 mg/dL    LDL Cholesterol  Desirable: < 100 mg/dL  Above Desirable: 100 - 129 mg/dL   Borderline High: 130 - 159 mg/dL   High:  160 - 189 mg/dL   Very High: >= 190 mg/dL    Non HDL Cholesterol  Desirable: < 130 mg/dL  Above Desirable: 130 - 159 mg/dL  Borderline High: 160 - 189 mg/dL  High: 190 - 219 mg/dL  Very High: >= 220 mg/dL   Albumin Random Urine Quantitative with Creat Ratio   Result Value Ref Range    Creatinine Urine mg/dL 34.9 mg/dL      Comment:      The reference ranges have not been established in urine creatinine. The results should be integrated into the clinical context for interpretation.    Albumin Urine mg/L <12.0 mg/L      Comment:      The reference ranges have not been established in urine albumin. The results should be integrated into the clinical context for interpretation.    Albumin Urine mg/g Cr        Comment:      Unable to calculate, urine albumin and/or urine creatinine is outside detectable limits.  Microalbuminuria is defined as an albumin:creatinine ratio of 17 to 299 for males and 25 to 299 for females. A ratio of albumin:creatinine of 300 or higher is indicative of overt proteinuria.  Due to biologic variability, positive results should be confirmed by a second, first-morning random or 24-hour timed urine specimen. If there is discrepancy, a third specimen is recommended. When 2 out of 3 results are in the microalbuminuria range, this is evidence for incipient nephropathy and warrants increased efforts at glucose control, blood pressure control, and institution of therapy with an angiotensin-converting-enzyme (ACE) inhibitor (if the patient can tolerate it).         If you have any questions or concerns, please call the clinic at the number listed above.       Sincerely,      Xenia Patel MD

## 2024-10-10 NOTE — PROGRESS NOTES
"  Assessment & Plan     Type 2 diabetes mellitus with diabetic polyneuropathy, without long-term current use of insulin (H)  Worsening to 6.5. doesn't want medication. Advised decrease sugar/carbs. Recheck in 6 months  - Albumin Random Urine Quantitative with Creat Ratio  - HEMOGLOBIN A1C  - HEMOGLOBIN A1C  - Comprehensive metabolic panel (BMP + Alb, Alk Phos, ALT, AST, Total. Bili, TP)  - Albumin Random Urine Quantitative with Creat Ratio    Essential hypertension  Borderline. No change in med for now  - losartan (COZAAR) 50 MG tablet  Dispense: 180 tablet; Refill: 3  - Comprehensive metabolic panel (BMP + Alb, Alk Phos, ALT, AST, Total. Bili, TP)    Hypercholesteremia  - Lipid panel    Schizophrenia, chronic condition (H)  Seeing psych    Morbid obesity (H)  Discussed increased exercise, low sugar/carbs.    Decreased GFR  - Comprehensive metabolic panel (BMP + Alb, Alk Phos, ALT, AST, Total. Bili, TP)    Visit for screening mammogram  - MA Screening Bilateral w/ Michael    Declines all vaccines, colonoscopy, dexa.         BMI  Estimated body mass index is 49.01 kg/m  as calculated from the following:    Height as of this encounter: 1.549 m (5' 1\").    Weight as of this encounter: 117.7 kg (259 lb 6.4 oz).   Weight management plan: Discussed healthy diet and exercise guidelines      The longitudinal plan of care for the diagnosis(es)/condition(s) as documented were addressed during this visit. Due to the added complexity in care, I will continue to support Rosalba in the subsequent management and with ongoing continuity of care.      Subjective   Rosalba is a 68 year old, presenting for the following health issues:  Diabetes (Follow up-/)        10/10/2024    11:40 AM   Additional Questions   Roomed by Violeta ELLIS CMA     HPI   Chief Complaint   Patient presents with    Diabetes     Follow up-       Doesn't want diabetes foot exam today              Review of Systems  Constitutional, HEENT, cardiovascular, pulmonary, gi " "and gu systems are negative, except as otherwise noted.      Objective    BP (!) 142/67 (BP Location: Left arm, Patient Position: Sitting, Cuff Size: Adult Large)   Pulse 87   Temp 98.6  F (37  C) (Oral)   Resp 14   Ht 1.549 m (5' 1\")   Wt 117.7 kg (259 lb 6.4 oz)   LMP  (LMP Unknown)   SpO2 96%   BMI 49.01 kg/m    Body mass index is 49.01 kg/m .  Physical Exam   GENERAL: alert and no distress  NECK: no adenopathy, no asymmetry, masses, or scars  RESP: lungs clear to auscultation - no rales, rhonchi or wheezes  CV: regular rate and rhythm, normal S1 S2, no S3 or S4, no murmur, click or rub, no peripheral edema  ABDOMEN: soft, protuberant   MS: no gross musculoskeletal defects noted, no edema  NEURO: Normal strength and tone, mentation intact and speech normal  PSYCH: mentation appears normal, affect normal/bright    Results for orders placed or performed in visit on 10/10/24 (from the past 24 hour(s))   HEMOGLOBIN A1C   Result Value Ref Range    Estimated Average Glucose 140 (H) <117 mg/dL    Hemoglobin A1C 6.5 (H) 0.0 - 5.6 %   Extra Tube    Narrative    The following orders were created for panel order Extra Tube.  Procedure                               Abnormality         Status                     ---------                               -----------         ------                     Extra Green Top (Lithium...[[id number]]                      In process                   Please view results for these tests on the individual orders.           Signed Electronically by: Xenia Patel MD    "

## 2024-10-11 LAB
ALBUMIN SERPL BCG-MCNC: 4.2 G/DL (ref 3.5–5.2)
ALP SERPL-CCNC: 76 U/L (ref 40–150)
ALT SERPL W P-5'-P-CCNC: 27 U/L (ref 0–50)
ANION GAP SERPL CALCULATED.3IONS-SCNC: 10 MMOL/L (ref 7–15)
AST SERPL W P-5'-P-CCNC: 27 U/L (ref 0–45)
BILIRUB SERPL-MCNC: 0.4 MG/DL
BUN SERPL-MCNC: 14.2 MG/DL (ref 8–23)
CALCIUM SERPL-MCNC: 10 MG/DL (ref 8.8–10.4)
CHLORIDE SERPL-SCNC: 99 MMOL/L (ref 98–107)
CHOLEST SERPL-MCNC: 151 MG/DL
CREAT SERPL-MCNC: 1.01 MG/DL (ref 0.51–0.95)
CREAT UR-MCNC: 34.9 MG/DL
EGFRCR SERPLBLD CKD-EPI 2021: 61 ML/MIN/1.73M2
FASTING STATUS PATIENT QL REPORTED: NO
FASTING STATUS PATIENT QL REPORTED: NO
GLUCOSE SERPL-MCNC: 110 MG/DL (ref 70–99)
HCO3 SERPL-SCNC: 28 MMOL/L (ref 22–29)
HDLC SERPL-MCNC: 38 MG/DL
LDLC SERPL CALC-MCNC: 82 MG/DL
MICROALBUMIN UR-MCNC: <12 MG/L
MICROALBUMIN/CREAT UR: NORMAL MG/G{CREAT}
NONHDLC SERPL-MCNC: 113 MG/DL
POTASSIUM SERPL-SCNC: 4.3 MMOL/L (ref 3.4–5.3)
PROT SERPL-MCNC: 7.1 G/DL (ref 6.4–8.3)
SODIUM SERPL-SCNC: 137 MMOL/L (ref 135–145)
TRIGL SERPL-MCNC: 157 MG/DL

## 2024-10-16 ENCOUNTER — TELEPHONE (OUTPATIENT)
Dept: FAMILY MEDICINE | Facility: CLINIC | Age: 69
End: 2024-10-16
Payer: MEDICARE

## 2024-10-16 NOTE — TELEPHONE ENCOUNTER
Called patient to discus result message. Patient verbalizes understanding     CHAVA Javed, Xenia Mueller MD  [facility]  Pls call pt: rest of lab looks fine. Continue current medications without change. Pls let me know if she has question. Please send result letter.

## 2024-11-15 DIAGNOSIS — I10 ESSENTIAL HYPERTENSION: ICD-10-CM

## 2024-11-15 RX ORDER — HYDROCHLOROTHIAZIDE 25 MG/1
25 TABLET ORAL DAILY
Qty: 90 TABLET | Refills: 2 | Status: SHIPPED | OUTPATIENT
Start: 2024-11-15

## 2025-02-10 ENCOUNTER — TRANSFERRED RECORDS (OUTPATIENT)
Dept: HEALTH INFORMATION MANAGEMENT | Facility: CLINIC | Age: 70
End: 2025-02-10
Payer: MEDICARE

## 2025-02-18 DIAGNOSIS — A60.00 GENITAL HERPES SIMPLEX, UNSPECIFIED SITE: ICD-10-CM

## 2025-02-18 DIAGNOSIS — I10 ESSENTIAL HYPERTENSION: ICD-10-CM

## 2025-02-18 RX ORDER — VALACYCLOVIR HYDROCHLORIDE 500 MG/1
TABLET, FILM COATED ORAL
Qty: 90 TABLET | Refills: 1 | Status: SHIPPED | OUTPATIENT
Start: 2025-02-18

## 2025-02-18 RX ORDER — AMLODIPINE BESYLATE 5 MG/1
5 TABLET ORAL DAILY
Qty: 90 TABLET | Refills: 0 | Status: SHIPPED | OUTPATIENT
Start: 2025-02-18

## 2025-03-12 ENCOUNTER — PATIENT OUTREACH (OUTPATIENT)
Dept: FAMILY MEDICINE | Facility: CLINIC | Age: 70
End: 2025-03-12
Payer: MEDICARE

## 2025-03-12 NOTE — TELEPHONE ENCOUNTER
Patient Quality Outreach    Patient is due for the following:   Diabetes -  Foot Exam  Hypertension -  BP check  Colon Cancer Screening      Topic Date Due    Pneumococcal Vaccine (1 of 2 - PCV) Never done    Zoster (Shingles) Vaccine (1 of 2) Never done    Flu Vaccine (1) Never done    COVID-19 Vaccine (6 - 2024-25 season) 09/01/2024       Action(s) Taken:   Patient has upcoming appointment, these items will be addressed at that time.    Type of outreach:    Sent [employer] message.    Questions for provider review:    None           Violeta Avila MA

## 2025-03-16 DIAGNOSIS — E78.00 HYPERCHOLESTEREMIA: ICD-10-CM

## 2025-03-17 RX ORDER — PRAVASTATIN SODIUM 80 MG/1
TABLET ORAL
Qty: 90 TABLET | Refills: 1 | Status: SHIPPED | OUTPATIENT
Start: 2025-03-17

## 2025-05-05 ENCOUNTER — ANCILLARY PROCEDURE (OUTPATIENT)
Dept: MAMMOGRAPHY | Facility: CLINIC | Age: 70
End: 2025-05-05
Attending: FAMILY MEDICINE
Payer: MEDICARE

## 2025-05-05 DIAGNOSIS — Z12.31 VISIT FOR SCREENING MAMMOGRAM: ICD-10-CM

## 2025-05-05 PROCEDURE — 77063 BREAST TOMOSYNTHESIS BI: CPT

## 2025-05-12 DIAGNOSIS — I10 ESSENTIAL HYPERTENSION: ICD-10-CM

## 2025-05-12 RX ORDER — AMLODIPINE BESYLATE 5 MG/1
5 TABLET ORAL DAILY
Qty: 90 TABLET | Refills: 0 | Status: SHIPPED | OUTPATIENT
Start: 2025-05-12

## 2025-05-18 ENCOUNTER — RESULTS FOLLOW-UP (OUTPATIENT)
Dept: FAMILY MEDICINE | Facility: CLINIC | Age: 70
End: 2025-05-18
Payer: MEDICARE

## 2025-07-16 ENCOUNTER — OFFICE VISIT (OUTPATIENT)
Dept: SURGERY | Facility: CLINIC | Age: 70
End: 2025-07-16
Payer: MEDICARE

## 2025-07-16 VITALS
DIASTOLIC BLOOD PRESSURE: 72 MMHG | HEIGHT: 61 IN | BODY MASS INDEX: 46.63 KG/M2 | SYSTOLIC BLOOD PRESSURE: 130 MMHG | WEIGHT: 247 LBS

## 2025-07-16 DIAGNOSIS — K43.9 VENTRAL HERNIA WITHOUT OBSTRUCTION OR GANGRENE: Primary | ICD-10-CM

## 2025-07-16 PROCEDURE — 3075F SYST BP GE 130 - 139MM HG: CPT | Performed by: SURGERY

## 2025-07-16 PROCEDURE — 99215 OFFICE O/P EST HI 40 MIN: CPT | Performed by: SURGERY

## 2025-07-16 PROCEDURE — 3078F DIAST BP <80 MM HG: CPT | Performed by: SURGERY

## 2025-07-16 NOTE — PROGRESS NOTES
General Surgery Clinic Visit    HPI:  This is a 66 year old female here today with a swelling at the umbilicus for the that last 20 + years. She has noticed it getting bigger.  She also has a new swelling developing above the umbilicus.        Allergies:Cat dander [animal dander]     Past Medical History        Past Medical History:   Diagnosis Date    Diabetes mellitus (H)              Past Surgical History         Past Surgical History:   Procedure Laterality Date    HC REMOVAL OF TONSILS,<14 Y/O         Description: Tonsillectomy;  Recorded: 11/19/2007;    HYSTERECTOMY         when she was 27 years old.     [facility] TOTAL ABDOM HYSTERECTOMY         Description: Total Abdominal Hysterectomy;  Recorded: 11/19/2007;  Comments: no oophorectomy            CURRENT MEDS:  Current Outpatient Prescriptions          Current Outpatient Medications   Medication Sig Dispense Refill    azelastine (ASTELIN) 0.1 % nasal spray Spray 1 spray into both nostrils 2 times daily 30 mL 1    diphenhydrAMINE (BENADRYL) 25 mg capsule [DIPHENHYDRAMINE (BENADRYL) 25 MG CAPSULE] Take 25 mg by mouth at bedtime.               fexofenadine (ALLEGRA) 60 MG tablet [FEXOFENADINE (ALLEGRA) 60 MG TABLET] Take 60 mg by mouth daily.        fish oil-omega-3 fatty acids 1000 MG capsule Take 2 capsules by mouth        hydrochlorothiazide (HYDRODIURIL) 25 MG tablet [HYDROCHLOROTHIAZIDE (HYDRODIURIL) 25 MG TABLET] Take 1 tablet (25 mg) by mouth ONCE daily. 90 tablet 3    ibuprofen (ADVIL) 200 MG tablet [IBUPROFEN (ADVIL) 200 MG TABLET] Take 200 mg by mouth every 6 (six) hours as needed for pain.        latanoprost (XALATAN) 0.005 % ophthalmic solution instill 1 drop into Both Eyes every evening        losartan (COZAAR) 50 MG tablet Take 2 tablets (100 mg) by mouth daily 180 tablet 3    MEDICATION CANNOT BE REORDERED - PLEASE MANUALLY REORDER AND DISCONTINUE THE OLD ORDER [OMEGA-3 FATTY ACIDS-VITAMIN E (FISH OIL) 1,000 MG CAP] Take 2 capsules by mouth.             "   naproxen sodium (ALEVE) 220 MG tablet [NAPROXEN SODIUM (ALEVE) 220 MG TABLET] Take 220 mg by mouth 2 (two) times a day with meals.        pravastatin (PRAVACHOL) 40 MG tablet [PRAVASTATIN (PRAVACHOL) 40 MG TABLET] Take 1 tablet (40 mg) by mouth every evening. 90 tablet 3    risperiDONE (RISPERDAL) 4 MG tablet Take 4 mg by mouth daily                 Family History         Family History   Problem Relation Age of Onset    Cancer Mother           pancreas    Diabetes Father      Hypertension Father      Dementia Father      Breast Cancer Cousin           in her 40 s ???    Asthma Brother           Family history is not pertinent to this patients Chief Complaint.      reports that she has quit smoking. She has a 28.00 pack-year smoking history. She has never used smokeless tobacco. She reports current alcohol use. She reports previous drug use.     Review of Systems -   10 point Review of systems is negative except for; as mentioned above in HPI and PMHx    Vitals: /72   Ht 1.549 m (5' 1\")   Wt 112 kg (247 lb)   LMP  (LMP Unknown)   BMI 46.67 kg/m    BMI= Body mass index is 46.67 kg/m .      EXAM:  GENERAL: Morbidly Obese Female  HEENT: EOMI, Anicteric Sclera, Moist Mucous Membranes,  In Mouth the pt does not have redness or bleeding gums  CARDIOVASCULAR: RRR w/out murmur   CHEST/LUNG: Clear to Auscultation  ABDOMEN:  Large umbilical hernia, The umbilicus is about the size of a baseball currently.  MUSCULOSKELETAL:  No deformities with good range of motion in all extremities  NEURO: She is ambulatory with good strength in both legs.  HEME/LYMPH: No Cervical Adenopathy or tenderness.      IMAGES:  none     Assessment/Plan:  69 yr old woman with an umbilical hernia and possibly another ventral hernia as well.  She has a BMI of 46 which is too high to expect to get a good hernia repair that will not recur.  She does not want weight loss surgery.  She does not like the idea of Ozempic.  She is willing to see a " Bariatric medical specialist.    If we could get her weight down to 200 lbs or below, we would have a much better chance of fixing her hernia.       PLAN   [facility]    Weight Loss  CT SCAN of ab/pelvis.    Eventual Robotic Ventral Hernia Repair.          Alfredo Stewart MD  [facility]  [phone]

## 2025-07-16 NOTE — LETTER
7/16/2025      Rosalba Bernal  [address]  [facility] [address]      Dear Colleague,    Thank you for referring your patient, Rosalba Bernal, to the [facility] [place]. Please see a copy of my visit note below.    General Surgery Clinic Visit    HPI:  This is a 66 year old female here today with a swelling at the umbilicus for the that last 20 + years. She has noticed it getting bigger.  She also has a new swelling developing above the umbilicus.        Allergies:Cat dander [animal dander]     Past Medical History        Past Medical History:   Diagnosis Date     Diabetes mellitus (H)              Past Surgical History         Past Surgical History:   Procedure Laterality Date     HC REMOVAL OF TONSILS,<14 Y/O         Description: Tonsillectomy;  Recorded: 11/19/2007;     HYSTERECTOMY         when she was 27 years old.      [facility] TOTAL ABDOM HYSTERECTOMY         Description: Total Abdominal Hysterectomy;  Recorded: 11/19/2007;  Comments: no oophorectomy            CURRENT MEDS:  Current Outpatient Prescriptions          Current Outpatient Medications   Medication Sig Dispense Refill     azelastine (ASTELIN) 0.1 % nasal spray Spray 1 spray into both nostrils 2 times daily 30 mL 1     diphenhydrAMINE (BENADRYL) 25 mg capsule [DIPHENHYDRAMINE (BENADRYL) 25 MG CAPSULE] Take 25 mg by mouth at bedtime.                fexofenadine (ALLEGRA) 60 MG tablet [FEXOFENADINE (ALLEGRA) 60 MG TABLET] Take 60 mg by mouth daily.         fish oil-omega-3 fatty acids 1000 MG capsule Take 2 capsules by mouth         hydrochlorothiazide (HYDRODIURIL) 25 MG tablet [HYDROCHLOROTHIAZIDE (HYDRODIURIL) 25 MG TABLET] Take 1 tablet (25 mg) by mouth ONCE daily. 90 tablet 3     ibuprofen (ADVIL) 200 MG tablet [IBUPROFEN (ADVIL) 200 MG TABLET] Take 200 mg by mouth every 6 (six) hours as needed for pain.         latanoprost (XALATAN) 0.005 % ophthalmic solution instill 1 drop into Both Eyes  "every evening         losartan (COZAAR) 50 MG tablet Take 2 tablets (100 mg) by mouth daily 180 tablet 3     MEDICATION CANNOT BE REORDERED - PLEASE MANUALLY REORDER AND DISCONTINUE THE OLD ORDER [OMEGA-3 FATTY ACIDS-VITAMIN E (FISH OIL) 1,000 MG CAP] Take 2 capsules by mouth.                naproxen sodium (ALEVE) 220 MG tablet [NAPROXEN SODIUM (ALEVE) 220 MG TABLET] Take 220 mg by mouth 2 (two) times a day with meals.         pravastatin (PRAVACHOL) 40 MG tablet [PRAVASTATIN (PRAVACHOL) 40 MG TABLET] Take 1 tablet (40 mg) by mouth every evening. 90 tablet 3     risperiDONE (RISPERDAL) 4 MG tablet Take 4 mg by mouth daily                 Family History         Family History   Problem Relation Age of Onset     Cancer Mother           pancreas     Diabetes Father       Hypertension Father       Dementia Father       Breast Cancer Cousin           in her 40 s ???     Asthma Brother           Family history is not pertinent to this patients Chief Complaint.      reports that she has quit smoking. She has a 28.00 pack-year smoking history. She has never used smokeless tobacco. She reports current alcohol use. She reports previous drug use.     Review of Systems -   10 point Review of systems is negative except for; as mentioned above in HPI and PMHx    Vitals: /72   Ht 1.549 m (5' 1\")   Wt 112 kg (247 lb)   LMP  (LMP Unknown)   BMI 46.67 kg/m    BMI= Body mass index is 46.67 kg/m .      EXAM:  GENERAL: Morbidly Obese Female  HEENT: EOMI, Anicteric Sclera, Moist Mucous Membranes,  In Mouth the pt does not have redness or bleeding gums  CARDIOVASCULAR: RRR w/out murmur   CHEST/LUNG: Clear to Auscultation  ABDOMEN:  Large umbilical hernia, The umbilicus is about the size of a baseball currently.  MUSCULOSKELETAL:  No deformities with good range of motion in all extremities  NEURO: She is ambulatory with good strength in both legs.  HEME/LYMPH: No Cervical Adenopathy or tenderness.      IMAGES:  none   "   Assessment/Plan:  69 yr old woman with an umbilical hernia and possibly another ventral hernia as well.  She has a BMI of 46 which is too high to expect to get a good hernia repair that will not recur.  She does not want weight loss surgery.  She does not like the idea of Ozempic.  She is willing to see a Bariatric medical specialist.    If we could get her weight down to 200 lbs or below, we would have a much better chance of fixing her hernia.       PLAN   [facility]    Weight Loss  CT SCAN of ab/pelvis.    Eventual Robotic Ventral Hernia Repair.          Alfredo Stewart MD  [facility]  [phone]    Again, thank you for allowing me to participate in the care of your patient.        Sincerely,        Alfredo Stewart MD    Electronically signed

## 2025-07-19 ENCOUNTER — HOSPITAL ENCOUNTER (OUTPATIENT)
Dept: CT IMAGING | Facility: HOSPITAL | Age: 70
Discharge: HOME | End: 2025-07-19
Attending: SURGERY | Admitting: SURGERY
Payer: MEDICARE

## 2025-07-19 DIAGNOSIS — K43.9 VENTRAL HERNIA WITHOUT OBSTRUCTION OR GANGRENE: ICD-10-CM

## 2025-07-19 LAB
CREAT BLD-MCNC: 1.1 MG/DL (ref 0.5–1)
EGFRCR SERPLBLD CKD-EPI 2021: 54 ML/MIN/1.73M2

## 2025-07-19 PROCEDURE — 250N000011 HC RX IP 250 OP 636: Performed by: SURGERY

## 2025-07-19 PROCEDURE — 82565 ASSAY OF CREATININE: CPT

## 2025-07-19 PROCEDURE — 74177 CT ABD & PELVIS W/CONTRAST: CPT

## 2025-07-19 PROCEDURE — 250N000009 HC RX 250: Performed by: SURGERY

## 2025-07-19 RX ORDER — IOPAMIDOL 755 MG/ML
90 INJECTION, SOLUTION INTRAVASCULAR ONCE
Status: COMPLETED | OUTPATIENT
Start: 2025-07-19 | End: 2025-07-19

## 2025-07-19 RX ADMIN — IOPAMIDOL 90 ML: 755 INJECTION, SOLUTION INTRAVENOUS at 11:19

## 2025-07-19 RX ADMIN — SODIUM CHLORIDE 100 ML: 9 INJECTION, SOLUTION INTRAVENOUS at 11:20

## 2025-07-23 ENCOUNTER — TELEPHONE (OUTPATIENT)
Dept: SURGERY | Facility: CLINIC | Age: 70
End: 2025-07-23

## 2025-07-23 ENCOUNTER — LAB (OUTPATIENT)
Dept: LAB | Facility: CLINIC | Age: 70
End: 2025-07-23
Payer: MEDICARE

## 2025-07-23 ENCOUNTER — OFFICE VISIT (OUTPATIENT)
Dept: SURGERY | Facility: CLINIC | Age: 70
End: 2025-07-23
Payer: MEDICARE

## 2025-07-23 VITALS
BODY MASS INDEX: 47.62 KG/M2 | WEIGHT: 252.2 LBS | DIASTOLIC BLOOD PRESSURE: 74 MMHG | SYSTOLIC BLOOD PRESSURE: 114 MMHG | HEIGHT: 61 IN

## 2025-07-23 DIAGNOSIS — E66.813 CLASS 3 SEVERE OBESITY DUE TO EXCESS CALORIES WITH BODY MASS INDEX (BMI) OF 40.0 TO 44.9 IN ADULT (H): Primary | ICD-10-CM

## 2025-07-23 DIAGNOSIS — R19.00 PELVIC MASS: ICD-10-CM

## 2025-07-23 DIAGNOSIS — R73.03 BORDERLINE DIABETES: ICD-10-CM

## 2025-07-23 DIAGNOSIS — K76.0 HEPATIC STEATOSIS: ICD-10-CM

## 2025-07-23 DIAGNOSIS — E66.813 CLASS 3 SEVERE OBESITY DUE TO EXCESS CALORIES WITH BODY MASS INDEX (BMI) OF 40.0 TO 44.9 IN ADULT (H): ICD-10-CM

## 2025-07-23 DIAGNOSIS — K43.9 VENTRAL HERNIA WITHOUT OBSTRUCTION OR GANGRENE: ICD-10-CM

## 2025-07-23 LAB
ALBUMIN SERPL BCG-MCNC: 4 G/DL (ref 3.5–5.2)
ALP SERPL-CCNC: 74 U/L (ref 40–150)
ALT SERPL W P-5'-P-CCNC: 20 U/L (ref 0–50)
ANION GAP SERPL CALCULATED.3IONS-SCNC: 12 MMOL/L (ref 7–15)
AST SERPL W P-5'-P-CCNC: 25 U/L (ref 0–45)
BILIRUB SERPL-MCNC: 0.4 MG/DL
BUN SERPL-MCNC: 16.9 MG/DL (ref 8–23)
CALCIUM SERPL-MCNC: 9.4 MG/DL (ref 8.8–10.4)
CHLORIDE SERPL-SCNC: 96 MMOL/L (ref 98–107)
CREAT SERPL-MCNC: 1.23 MG/DL (ref 0.51–0.95)
EGFRCR SERPLBLD CKD-EPI 2021: 48 ML/MIN/1.73M2
GLUCOSE SERPL-MCNC: 115 MG/DL (ref 70–99)
HCO3 SERPL-SCNC: 27 MMOL/L (ref 22–29)
POTASSIUM SERPL-SCNC: 4 MMOL/L (ref 3.4–5.3)
PROT SERPL-MCNC: 7 G/DL (ref 6.4–8.3)
SODIUM SERPL-SCNC: 135 MMOL/L (ref 135–145)
TSH SERPL DL<=0.005 MIU/L-ACNC: 2.45 UIU/ML (ref 0.3–4.2)
VIT B12 SERPL-MCNC: 272 PG/ML (ref 232–1245)
VIT D+METAB SERPL-MCNC: 24 NG/ML (ref 20–50)

## 2025-07-23 PROCEDURE — 36415 COLL VENOUS BLD VENIPUNCTURE: CPT

## 2025-07-23 PROCEDURE — 80053 COMPREHEN METABOLIC PANEL: CPT

## 2025-07-23 PROCEDURE — 82306 VITAMIN D 25 HYDROXY: CPT

## 2025-07-23 PROCEDURE — 84443 ASSAY THYROID STIM HORMONE: CPT

## 2025-07-23 PROCEDURE — 82607 VITAMIN B-12: CPT

## 2025-07-23 ASSESSMENT — SLEEP AND FATIGUE QUESTIONNAIRES
HOW LIKELY ARE YOU TO NOD OFF OR FALL ASLEEP WHILE SITTING QUIETLY AFTER LUNCH WITHOUT ALCOHOL: WOULD NEVER DOZE
HOW LIKELY ARE YOU TO NOD OFF OR FALL ASLEEP WHILE SITTING INACTIVE IN A PUBLIC PLACE: WOULD NEVER DOZE
HOW LIKELY ARE YOU TO NOD OFF OR FALL ASLEEP IN A CAR, WHILE STOPPED FOR A FEW MINUTES IN TRAFFIC: WOULD NEVER DOZE
HOW LIKELY ARE YOU TO NOD OFF OR FALL ASLEEP WHILE LYING DOWN TO REST IN THE AFTERNOON WHEN CIRCUMSTANCES PERMIT: SLIGHT CHANCE OF DOZING
HOW LIKELY ARE YOU TO NOD OFF OR FALL ASLEEP WHILE SITTING AND READING: WOULD NEVER DOZE
HOW LIKELY ARE YOU TO NOD OFF OR FALL ASLEEP WHILE SITTING AND TALKING TO SOMEONE: WOULD NEVER DOZE
HOW LIKELY ARE YOU TO NOD OFF OR FALL ASLEEP WHILE WATCHING TV: WOULD NEVER DOZE
HOW LIKELY ARE YOU TO NOD OFF OR FALL ASLEEP WHEN YOU ARE A PASSENGER IN A CAR FOR AN HOUR WITHOUT A BREAK: WOULD NEVER DOZE

## 2025-07-23 NOTE — PROGRESS NOTES
"New Medical Weight Management Consult    PATIENT:  Rosalba Bernal  MRN:         [id number]  :         1955  DMITRIY:         2025        I had the pleasure of seeing your patient, Rosalba Bernal. Full intake/assessment was done to determine barriers to weight loss success and develop a treatment plan. Rosalba Bernal is a 69 year old female interested in treatment of medical problems associated with excess weight. She has a height of 5' 1\", a weight of 252 lbs 3.2 oz, and the calculated Body mass index is 47.65 kg/m .  Co-morbid metabolic syndrome with borderline type II diabetes (A1c of 6.5% 10/10/24 and diffuse hepatic steatosis w/ hepatomegaly on her 25 CT scan (adenexal masses in need of further workup/pending) and ventral hernias that cannot be reliably/durably repaired until BMI under 36. Her pelvic mass hasn't been evaluated yet so referrals placed today and order for recommended pelvic ultrasound to better characterize. Dr. Patel, her PCP, messaged today regarding these finding/referrals.     Rosalba is a patient with mature onset class III, morbid obesity with significant element of familial/genetic influence and with current health consequences. She does need aggressive weight loss plan due to hepatic steatosis and type II diabetes/metabolic syndrome evolution in the last year.  Rosalba Bernal eats a high carb diet, uses food as a reward, uses food as mood management, mostly eats during the evening, has a disorganized meal pattern, and is on disability making some financial strain on food choices..      Her problem is complicated by a hunger disorder, strong craving/reward pathways, mental health/psychopharmacological barriers, gender and short stature, impaired metabolic perception, and limitations on activity due to hernia issues      Review of the patient's history and habits today suggest that weight gain has been long standing marquez. Risperdal has contributed to her weight " gain. .    Previous Interventions found to be helpful in the past for weight loss include some recent dietary changes.    We discussed a toolbox approach to weight management today and she is open to combining mindful, reduced calorie dietary therapy with increased mindfulness techniques, activity/exercise improvements to optimize their current and future health.  Medication assistance for appetite control was discussed today and on review of the risks/benefits for this patients health history, we've decided to start with appetite suppressant therapy.  I tried to advocate for a trial of Ozempic or Mounjaro in light of her evolving diabetes but she is adamant that she will never use these or metformin. This seems to be to delusional level of aversion/fear of these medications. She's not a candidate for bariatric surgery due to her aversion as well as mental health concerns.     She was open to a trial of anti-craving medication, naltrexone. With psychiatric med weight gain, it's often best in combination with metformin but solo therapy may offer some benefits for her. If requiring any operative intervention for her pelvic mass, she can hold naltrexone 10 days prior to such procedures.     ASSESSMENT AND PLAN  Problem List Items Addressed This Visit    None  Visit Diagnoses         Class 3 severe obesity due to excess calories with body mass index (BMI) of 40.0 to 44.9 in adult (H)    -  Primary    Relevant Orders    Comprehensive metabolic panel    TSH    Vitamin B12    25- OH-Vitamin D      Ventral hernia without obstruction or gangrene          Hepatic steatosis          Borderline diabetes        Relevant Orders    Comprehensive metabolic panel    TSH    Vitamin B12    25- OH-Vitamin D      Pelvic mass        Relevant Orders    Ob/Gyn  Referral    US Pelvic Complete with Transvaginal           Plan:  Welcome to your weight loss season. To start, we'll look to help improve your diabetes and hepatic  "steatosis and get you to a point where your hernias can be durable repaired if requiring fixing in the future.     2. To lose weight, we'd aim for 1400kcal/day with 75 grams of lean protein daily and we'll have you work with our dieticians for improvements in both weight and diabetes care.     3. Looking at your CT scan from last week, pelvic mass needs more workup so referral to GYN placed and pelvic ultrasound placed. I'll message Dr. Patel about these findings.     4. Weight loss should improve fatty liver disease once below 200 lbs.     5. Given your reluctance for use of Ozempic or Mounajaro or Metformin, we'll start some naltrexone therapy to reduce cravings. If poorly tolerated, we could look at some options but we cannot use bupropion, topamax or phentermine products for fear of mood destabilization.    Stop the naltrexone if any need for surgery or opiate pain medications. Start half a tablet with supper for 10 days then increase, if tolerating it well, to half a tablet with 2 meals daily (breakfast/supper or lunch/supper).     6. Check labs today.   If needing food shelf assistance to reduce costs look at this web site:  https://www.hungersolutions.org/find-help/      60 minutes spent by me on the date of the encounter doing chart review, history and exam, documentation and further activities per the note        She has the following co-morbidities:        7/23/2025    10:20 AM   --   I have the following health issues associated with obesity Type II Diabetes    Pre-Diabetes    High Blood Pressure    High Cholesterol   I have the following symptoms associated with obesity Back Pain           7/23/2025    10:20 AM   Patient Goals   If yes, please indicate which surgery? Hernia           7/23/2025    10:20 AM   Referring Provider   Please name the provider who referred you to Medical Weight Management  If you do not know, please answer \"I Don't Know\" Alfredo Stewart           7/23/2025    10:20 AM   Weight " History   How concerned are you about your weight? Very Concerned   I became overweight After Pregnancy   The following factors have contributed to my weight gain Mental Health Issues    Started on Medication that Caused Weight Gain    Eating Wrong Types of Food    Lack of Exercise    Genetic (Runs in the Family)    Stress   My lowest weight since age 19 was 126   My highest weight since age 19 was 265   The most weight I have ever lost was (lbs) 18   I have the following family history of obesity/being overweight One or more of my siblings are overweight   How has your weight changed over the last year? Lost   How many pounds? 18           7/23/2025    10:20 AM   Diet Recall Review with Patient   If you do eat breakfast, what types of food do you eat? egg mcmuffin   If you do eat lunch, what types of food do you typically eat? chicken, green beans   If you do eat supper, what types of food do you typically eat? chicken, green beans   If you do snack, what types of food do you typically eat? pb sandwich   How many glasses of juice do you drink in a typical day? 0   How many of glasses of milk do you drink in a typical day? 1   If you do drink milk, what type? N/A   How many 8oz glasses of sugar containing drinks such as Keith-Aid/sweet tea do you drink in a day? 0   How many cans/bottles of sugar pop/soda/tea/sports drinks do you drink in a day? 0   How many cans/bottles of diet pop/soda/tea or sports drink do you drink in a day? 0   How often do you have a drink of alcohol? Never           7/23/2025    10:20 AM   Eating Habits   Generally, my meals include foods like these bread, pasta, rice, potatoes, corn, crackers, sweet dessert, pop, or juice Everyday   Generally, my meals include foods like these fried meats, brats, burgers, french fries, pizza, cheese, chips, or ice cream Less Than Weekly   Eat fast food (like [employer], [employer], [employer]aco Bell) A Few Times a Week   Eat at a buffet or sit-down restaurant Never    Eat most of my meals in front of the TV or computer Everyday   Often skip meals, eat at random times, have no regular eating times Never   Rarely sit down for a meal but snack or graze throughout Less Than Weekly   Eat extra snacks between meals Never   Eat most of my food at the end of the day Less Than Weekly   Eat in the middle of the night or wake up at night to eat Never   Eat extra snacks to prevent or correct low blood sugar Never   Eat to prevent acid reflux or stomach pain Never   Worry about not having enough food to eat Never   I eat when I am depressed Never   I eat when I am stressed Never   I eat when I am bored Never   I eat when I am anxious Never   I eat when I am happy or as a reward Less Than Weekly   I feel hungry all the time even if I just have eaten Never   Feeling full is important to me Everyday   I finish all the food on my plate even if I am already full Never   I can't resist eating delicious food or walk past the good food/smell Everyday   I eat/snack without noticing that I am eating Never   I eat when I am preparing the meal Never   I eat more than usual when I see others eating Never   I have trouble not eating sweets, ice cream, cookies, or chips if they are around the house Everyday   I think about food all day Never   What foods, if any, do you crave? Cheese   Please list any other foods you crave? pastry           7/23/2025    10:20 AM   Amount of Food   I feel out of control when eating Never   I eat a large amount of food, like a loaf of bread, a box of cookies, a pint/quart of ice cream, all at once Never   I eat a large amount of food even when I am not hungry Never   I eat rapidly Never   I eat alone because I feel embarrassed and do not want others to see how much I have eaten Never   I eat until I am uncomfortably full Never   I feel bad, disgusted, or guilty after I overeat Never           7/23/2025    10:20 AM   Activity/Exercise History   How much of a typical 12 hour  day do you spend sitting? Most of the Day   How much of a typical 12 hour day do you spend lying down? Half the Day   How much of a typical day do you spend walking/standing? Less Than Half the Day   How many hours (not including work) do you spend on the TV/Video Games/Computer/Tablet/Phone? 6 Hours or More   How many times a week are you active for the purpose of exercise? Never   What keeps you from being more active? Other   How many total minutes do you spend doing some activity for the purpose of exercising when you exercise? None       PAST MEDICAL HISTORY:  Past Medical History:   Diagnosis Date    Diabetes mellitus (H)            7/23/2025    10:20 AM   Work/Social History Reviewed With Patient   My employment status is Disabled   What is your marital status? Single   If you have children, are they overweight? No   Who do you live with? Myself   Who does the food shopping? Myself           7/23/2025    10:20 AM   Mental Health History Reviewed With Patient   Have you ever been physically or sexually abused? No   How often in the past 2 weeks have you felt little interest or pleasure in doing things? Not at all   Over the past 2 weeks how often have you felt down, depressed, or hopeless? Not at all           7/23/2025    10:20 AM   Sleep History Reviewed With Patient   How many hours do you sleep at night? 8       Past Surgical History:   Procedure Laterality Date    HC REMOVAL OF TONSILS,<12 Y/O      Description: Tonsillectomy;  Recorded: 11/19/2007;    HYSTERECTOMY      when she was 27 years old.     ZZC TOTAL ABDOM HYSTERECTOMY      Description: Total Abdominal Hysterectomy;  Recorded: 11/19/2007;  Comments: no oophorectomy       SLEEP:      7/23/2025     9:59 AM    [place] Sleepiness Scale ( SREEDHAR Alcantara  [id number]<br>ESS - [place]/English - Final version - 21 Nov 07 - [facility] [place].)   Sitting and reading Would never doze   Watching TV Would never doze   Sitting, inactive in a public place (e.g. a  theatre or a meeting) Would never doze   As a passenger in a car for an hour without a break Would never doze   Lying down to rest in the afternoon when circumstances permit Slight chance of dozing   Sitting and talking to someone Would never doze   Sitting quietly after a lunch without alcohol Would never doze   In a car, while stopped for a few minutes in traffic Would never doze   Byfield Score (MC) 1   Byfield Score (Sleep) 1        Proxy-reported       STOP-BANG:  Do you SNORE loudly (louder than talking or loud enough to be heard through closed doors)?: (Proxy-Rptd) No  Do you often feel TIRED, fatigued, or sleepy during daytime?: (Proxy-Rptd) Yes  Has anyone OBSERVED you stop breathing during your sleep?: (Proxy-Rptd) No  Do you have or are you being treated for high blood PRESSURE?: (Proxy-Rptd) Yes  BMI more than 35kg/m2?: (Proxy-Rptd) Yes  AGE over 50 years old?: (Proxy-Rptd) Yes  NECK circumference > 16 inches (40cm)?: (Proxy-Rptd) Yes  GENDER: Male?: (Proxy-Rptd) No  Total Score: (Proxy-Rptd) 5       Social History     Socioeconomic History    Marital status: Single     Spouse name: Not on file    Number of children: Not on file    Years of education: Not on file    Highest education level: Not on file   Occupational History    Not on file   Tobacco Use    Smoking status: Former     Current packs/day: 1.00     Average packs/day: 1 pack/day for 28.0 years (28.0 ttl pk-yrs)     Types: Cigarettes     Passive exposure: Never    Smokeless tobacco: Never    Tobacco comments:     quit apx 35 yrs ago   Vaping Use    Vaping status: Never Used   Substance and Sexual Activity    Alcohol use: Yes     Comment: Alcoholic Drinks/day: a beer every 4 months    Drug use: Not Currently     Comment: Drug use: psychodelics    Sexual activity: Not Currently   Other Topics Concern    Not on file   Social History Narrative    Lives alone. She sees her older sister quite a bit. She likes to go to [place] [facility]. Been  on disability because schizophrenia. 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   Has 2 children - sons who are  and she sees [facility] here and there. Has 4 grandsons.   .      Social Drivers of Health     Financial Resource Strain: Not on file   Food Insecurity: Not on file   Transportation Needs: Not on file   Physical Activity: Not on file   Stress: Not on file   Social Connections: Not on file   Interpersonal Safety: Low Risk  (5/16/2025)    Interpersonal Safety     Do you feel physically and emotionally safe where you currently live?: Yes     Within the past 12 months, have you been hit, slapped, kicked or otherwise physically hurt by someone?: No     Within the past 12 months, have you been humiliated or emotionally abused in other ways by your partner or ex-partner?: No   Housing Stability: Not on file       MEDICATIONS:   Current Outpatient Medications   Medication Sig Dispense Refill    amLODIPine (NORVASC) 5 MG tablet Take 1 tablet (5 mg) by mouth once daily. 90 tablet 0    azelastine (ASTELIN) 0.1 % nasal spray Spray 1 spray into both nostrils 2 times daily 30 mL 1    diphenhydrAMINE (BENADRYL) 25 mg capsule [DIPHENHYDRAMINE (BENADRYL) 25 MG CAPSULE] Take 25 mg by mouth at bedtime.             fexofenadine (ALLEGRA) 60 MG tablet [FEXOFENADINE (ALLEGRA) 60 MG TABLET] Take 60 mg by mouth daily.      fish oil-omega-3 fatty acids 1000 MG capsule Take 2 capsules by mouth      hydrochlorothiazide (HYDRODIURIL) 25 MG tablet TAKE ONE TABLET BY MOUTH ONCE DAILY 90 tablet 2    ibuprofen (ADVIL) 200 MG tablet [IBUPROFEN (ADVIL) 200 MG TABLET] Take 200 mg by mouth every 6 (six) hours as needed for pain.      latanoprost (XALATAN) 0.005 % ophthalmic solution instill 1 drop into Both Eyes every evening      losartan (COZAAR) 50 MG tablet Take 2 tablets (100 mg) by mouth daily. 180 tablet 3    mupirocin (BACTROBAN) 2 % external ointment Apply a pea sized amount to inside of each nostril 3x  "daily with Qtip for 14 days 22 g 0    naproxen sodium (ALEVE) 220 MG tablet [NAPROXEN SODIUM (ALEVE) 220 MG TABLET] Take 220 mg by mouth 2 (two) times a day with meals.      phenazopyridine (PYRIDIUM) 200 MG tablet Take 1 tablet (200 mg) by mouth 3 times daily as needed for irritation or pain 6 tablet 0    pravastatin (PRAVACHOL) 80 MG tablet TAKE 1 TABLET (80 MG) BY MOUTH ONCE DAILY 90 tablet 1    risperiDONE (RISPERDAL) 4 MG tablet Take 4 mg by mouth daily       timolol maleate (TIMOPTIC) 0.5 % ophthalmic solution Instill 1 drop in Both Eyes twice a day      valACYclovir (VALTREX) 500 MG tablet Take 1 tablet (500 mg) by mouth daily. 90 tablet 3     Risperdal is quite obesogenic.   ALLERGIES:   Allergies   Allergen Reactions    Cat Dander [Animal Dander] Other (See Comments)     Sneezing, Nasal Congestion   Last Comprehensive Metabolic Panel:  Lab Results   Component Value Date     05/16/2025    POTASSIUM 3.6 05/16/2025    CHLORIDE 98 05/16/2025    CO2 26 05/16/2025    ANIONGAP 12 05/16/2025     (H) 05/16/2025    BUN 16.6 05/16/2025    CR 1.1 (H) 07/19/2025    GFRESTIMATED 54 (L) 07/19/2025    BRITTA 9.6 05/16/2025       Liver Function Studies -   Recent Labs   Lab Test 05/16/25  1137   PROTTOTAL 6.7   ALBUMIN 4.1   BILITOTAL 0.4   ALKPHOS 79   AST 26   ALT 25     No results found for: \"TSH\"  Lab Results   Component Value Date    A1C 6.3 05/16/2025    A1C 6.5 10/10/2024    A1C 5.8 10/12/2023    A1C 6.1 10/06/2022    A1C 6.1 10/01/2021     Recent Labs   Lab Test 10/10/24  1148 04/05/24  1351   CHOL 151 140   HDL 38* 41*   LDL 82 68   TRIG 157* 153*     Narrative & Impression   EXAM: CT ABDOMEN PELVIS W CONTRAST  LOCATION: [facility]  DATE: 7/19/2025     INDICATION: Ventral hernia without obstruction or gangrene  COMPARISON: None.  TECHNIQUE: CT scan of the abdomen and pelvis was performed following injection of IV contrast. Multiplanar reformats were obtained. Dose reduction " techniques were used.  CONTRAST: Isovue 370, 90 mL IV     FINDINGS:   LOWER CHEST: Visualized lungs are clear. Trace bilateral pleural effusions. Heart size upper normal with no pericardial effusion.      HEPATOBILIARY: Diffuse hepatic steatosis and borderline hepatomegaly. Liver is otherwise normal.  Gallbladder unremarkable with no visible stone or sludge material.  No  bile duct dilatation.      PANCREAS: Normal.     SPLEEN: Spleen size normal.     ADRENAL GLANDS: Benign 1 cm myelolipoma right adrenal gland requires no follow-up. Adrenal glands otherwise normal.     KIDNEYS/BLADDER: Kidneys, ureters and bladder are normal.     BOWEL AND ABDOMINAL WALL: There are two discrete supraumbilical and umbilical ventral hernias as follows.     A 3.5 x 2.5 cm midline abdominal wall defect just superior to the umbilicus (axial image 104, sagittal image 80, coronal image 33) through which a 10 x 8 x 5 cm fat-containing hernia sac passes.     A 5.0 x 4.5 cm abdominal wall defect at the umbilicus (sagittal image 83, coronal image 35) through which a 13 x 11 x 8 cm hernia sac passes that contains multiple loops of unobstructed small bowel and mesenteric fat.     Very mild uncomplicated acute versus subacute diverticulitis proximal sigmoid colon (series 3 axial images 120-125, coronal image 60, sagittal image 98) with no abscess, obstruction or perforation. No free fluid or free air. Appendix not visualized but   nothing for appendicitis.     LYMPH NODES: No lymphadenopathy.     VASCULATURE: Normal caliber abdominal aorta.       PELVIC ORGANS: Hysterectomy. Indeterminate 10 x 7 x 6 cm right adnexal mass and 5 x 4 x 3 cm left adnexal mass.     MUSCULOSKELETAL: Spondylotic change lumbar spine. No bone lesion or fracture.                                                                       IMPRESSION:   1.  Indeterminate 10 x 7 x 6 cm right adnexal mass and 5 x 4 x 3 cm left adnexal mass. Recommend pelvic ultrasound and gynecology  "consultation.  2.  There are two discrete supraumbilical and umbilical ventral hernias as described above.  3.  Very mild uncomplicated acute versus subacute diverticulitis proximal sigmoid colon.  4.  Diffuse hepatic steatosis and borderline hepatomegaly.  5.  Trace bilateral pleural effusions.      GYN consult is pending as is pelvic ultrasound, orders placed today as none were in chart on review today.      FFM of 116.2 lbs, BMR of 1686kcal/day total body water% 35.5%, visceral fat rating of 19 is upper end of moderate severity.   PHYSICAL EXAM:  Objective    /74   Ht 1.549 m (5' 1\")   Wt 114.4 kg (252 lb 3.2 oz)   LMP  (LMP Unknown)   BMI 47.65 kg/m    /74   Ht 1.549 m (5' 1\")   Wt 114.4 kg (252 lb 3.2 oz)   LMP  (LMP Unknown)   BMI 47.65 kg/m    Body mass index is 47.65 kg/m .  Physical Exam   GENERAL: alert and no distress. A bit unkempt. Dry skin to brows. Dandruff in hair. Slight discoloration of the tip of her nose/blanching. Central adiposity and short stature.   NECK: no adenopathy, no asymmetry, masses, or scars  RESP: lungs clear to auscultation - no rales, rhonchi or wheezes  CV: regular rate and rhythm, normal S1 S2, no S3 or S4, no murmur, click or rub, no peripheral edema  ABDOMEN: central adiposity.   MS: no gross musculoskeletal defects noted, ambulates without assistance.   Affect is guarded, suspicious of recommendations but can be reasoned with. She is not open to medications that she's not comfortable with.         Sincerely,    Wei Ceballos MD          "

## 2025-07-23 NOTE — TELEPHONE ENCOUNTER
Medication Question or Refill        What medication are you calling about (include dose and sig)?: Naltrexone    Preferred Pharmacy:   [id number] - [place], [place] - [address]  [address]  [place] [address]  Phone: [phone] Fax: [phone]      Controlled Substance Agreement on file:   CSA -- Patient Level:    CSA: None found at the patient level.       Who prescribed the medication?: Dr Ceballos    Do you need a refill? No    When did you use the medication last? N/a    Patient offered an appointment? No    Do you have any questions or concerns?  Yes: pt seen today & prescription hasn't been sent to her pharmacy. Pt is asking if its due to her lab work.      Okay to leave a detailed message?: Yes at Cell number on file:    [phone]

## 2025-07-23 NOTE — PROGRESS NOTES
Rosalba Bernal is a 69 year old who is being evaluated via a billable in-person visit.        Medical Weight Loss Initial Diet Evaluation  Assessment:  This patient was referred by Dr. Ceballos for MNT as treatment for Morbid obesity which is impacting T2DM, HTN, high cholesterol, back pain      Rosalba is presenting today for a new weight management nutrition consultation. Pt has had an initial appointment with Dr. Ceballos.    Weight loss medication: Naltrexone - planning to start on 8/1    Personal Goals: needs hernia surgery - needs to be at 200 lbs or below for surgery     Anthropometrics:    Pt's weight is 249.7 lbs  Initial weight: 252.2 lbs  Weight change: -2.5 lbs  BMI: 47.18  Ideal body weight: 47.8 kg (105 lb 6.1 oz)  Adjusted ideal body weight: 74.4 kg (164 lb 1.7 oz)  Estimated RMR (Calhoun-St Jeor equation):  1,611 kcals x 1.2 (sedentary) = 1,934 kcals (for weight maintenance)  Recommended Calorie Intake: 1,400 kcals/day  Recommended Protein Intake: 75 grams of protein/day  Recommended Fiber Intake: 25+ grams/day    Medical History:  Patient Active Problem List   Diagnosis    Lateral Epicondylitis    Hyperplastic Polyp Of The Large Intestine    Seasonal allergic rhinitis    Rhinitis    Intertrigo    Prediabetes    Schizophrenia, chronic condition (H)    Hypertension    Sphenoid sinusitis    Residual type schizophrenic disorder, unspecified condition    Umbilical hernia without obstruction and without gangrene    Type 2 diabetes mellitus with diabetic polyneuropathy, without long-term current use of insulin (H)   Diabetes: T2DM, A1C of 6.3% on 5/16/2025    Nutrition History:   Food allergies/intolerances/cultural or Judaism food customs: Yes - dairy (all products). Does not like mushrooms.    Weight loss history: Patient had various questions about her CT scan from Dr. Stewart - MARY reassured patient that she would message provider/team to reach out to patient. Discussed the importance of balancing  meals with protein and fibrous items. Encouraged patient to use the 10/10 rule with prepackaged items - discussed looking at the products she has at home first.     Vitamins/Mineral Supplementation: fish oil    Dietary Recall:  Wakes up at 7:30 AM   Breakfast (7:30-8 AM): coffee with unsweetened almond milk and then banana OR breakfast sandwich from [employer]  Lunch (11:30 AM): chicken and green beans/broccoli/cauliflower/brussel sprouts OR salad with chicken - will sometimes have a sweet in the evening  Dinner (5 PM): chicken and green beans/various vegetables  Snack 8 PM: pb sandwich (white bread) and a peanut butter cookie  Goes to be after 8 PM    Overnight eating: No  Eating out: 1x/month - [employer], [employer]    Beverages:   Water  Coffee with almond milk- 1 cup/day    No pop, sparkling water, juice    Exercise:   Limited at this time    Struggles with walking d/t her foot issues    Nutrition Diagnosis (PES statement):   Morbid obesity related to excessive energy intake as evidence by BMI of 47.65     Nutrition Intervention  Food and/or Nutrient Delivery   Placed emphasis on importance of developing a healthy meal routine, aiming for 3 meals a day and limited snacks.  Nutrition Education   Discussed with patient how to build a meal: the importance of including a lean/low fat protein at each meal, include a source of vegetables at a minimum of lunch and dinner and limiting carbohydrate intake  Educated on sources of lean protein, portion sizes, the amount of grams found in each source. Recommend patient to aim for 20-30g protein at each meal.  Educated on how to read a food label: keeping total fat <10g and sugar <10g per serving.  Discussed the importance of adequate hydration, with emphasis on drinking 64oz of water or zero calorie beverages per day.    Goals established by patient:   Stay consistent with three meals per day  Add in fibrous items with your meals and evening snack  Stay consistent with water  intake during the day  Use the 10/10 Rule with pre-packaged items    Handouts provided:  Intro to MW  Diabetes Plate Method  High Fiber Foods  10/10 Rule  Stretching your food dollar    Assessment/Plan:    Pt will follow up PRN with bariatrician and 2 month(s) with dietitian.       In-person duration: 30 minutes    Alisia Rowe RD

## 2025-07-23 NOTE — PATIENT INSTRUCTIONS
"Plan:  Welcome to your weight loss season. To start, we'll look to help improve your diabetes and hepatic steatosis and get you to a point where your hernias can be durable repaired if requiring fixing in the future.     2. To lose weight, we'd aim for 1400kcal/day with 75 grams of lean protein daily and we'll have you work with our dieticians for improvements in both weight and diabetes care.  80 oz of water daily.     3. Looking at your CT scan from last week, pelvic mass needs more workup so referral to GYN placed and pelvic ultrasound placed. I'll message Dr. Patel about these findings.     4. Weight loss should improve fatty liver disease once below 200 lbs.     5. Given your reluctance for use of Ozempic or Mounajaro or Metformin, we'll start some naltrexone therapy to reduce cravings. If poorly tolerated, we could look at some options but we cannot use bupropion, topamax or phentermine products for fear of mood destabilization.    Stop the naltrexone if any need for surgery or opiate pain medications. Start half a tablet with supper for 10 days then increase, if tolerating it well, to half a tablet with 2 meals daily (breakfast/supper or lunch/supper).     6. Check labs today.   If needing food shelf assistance to reduce costs look at this web site:  https://www.hungersolutions.org/find-help/    What makes a person succeed with dramatic and sustained weight loss?    It's being at the right point in your life where you feel the need to lose the weight, not because anyone told you so but because of a voice inside of you that says, \"I am ready for this\".  You're now at a point where you may be feeling anxious, irritable and when you look in the mirror you do not recognize the person looking back.  Your healthy self is buried somewhere in that reflexion and you want to free it again.  This is the sort of motivation that leads to success, and it comes from you.    Because the only person that can lose that " "extra weight is you, I like my patients to focus on the mindset of being in Weight Loss Season.  This gives you permission to make the changes necessary to be consistent with the diet/activity and behavior changes that lead to successful, healthy weight loss.  Nearly any diet plan can work for weight loss, but keeping it healthy and nutrient based to prevent deficiencies/hair loss/fatigue or irritability is vital.  If you have a plan you want to try, we'll work with you to make sure no adjustments are needed to keep you healthy through your weight loss season and working with our Bariatric Dieticians you'll be given expert guidance to customize your diet plan to suit your particular needs. If you don't have your own ideas in mind, we are always happy to suggest well researched and validated plans that provide enough food to prevent hunger but still tap into your excess fat reserves and lose weight in a sustainable fashion.  There is great evidence that lean protein/healthy fat intake with good fiber intake while minimizing simple starches/carbs produces reliable/sustainable weight loss in most people. But some feel more connected to an intermittent fasting/fast mimicking or ketogenic diet.  These protocols can be hard for many to stick with and that's why we prefer the protein/healthy fat focused diets but if these alternative strategies appeal to you, we can work with you to optimize your knowledge and results with these tools.    Losing weight is a temporary commitment, but you need to be \"All In\" to have a good weight loss season.  To avoid frustration, you have to be willing to be on track 19 out of 20 days or even better than that. But, weight loss season is generally only 4-8 months in length. After that length of time, it can be hard to maintain a negative calorie balance and our brain, motivations and metabolism will usually bring you to a plateau that cannot be broken in this modern world where other " "commitments start to take priority. That's when we look to stabilize the weight loss you've achieved.  If you've reached your goal by that time, fantastic, and job well done.  If there is more to go, then after a few months of stabilization, we can usually attack that previous plateau and break into new territory.    Because of this time limitation, we want to really get to work right away and get into a sustainable routine ASAP.  One of the best predictors of how much weight you're going to lose throughout the season is how much you lose in the first 6 weeks, so prepare well and jump in with both feet.      Occasionally, people may feel like they cannot commit fully to the changes necessary and may want to change one thing at a time and \"get used to\" the idea of losing weight.  That is OK because that is where they are in their life, and they cannot fully commit for any number of reasons.  It's part of that internal motivation and they just haven't reached PRIORTY NUMBER ONE status yet. It's possible that what they need is more time to reach that point and I am always willing to work with people that want to \"dabble\", but understand, the amount of success obtained with half measures, is much less than half results. Behavior Change cannot occur until we prepare our minds, bodies and environment for what is to come, action!    As you go through your plan, look for things to keep your motivation rolling.  The most successful people have a goal or target/reward that they are working towards.  Having a reward that celebrates your new fitness, mobility and energy is the best sort because it will encourage you to do well with the weight maintenance phase and long term lifestyle changes that promotes keeping the weight off for the long term.  Usually, \"getting healthier\" or improving blood tests or losing weight so your clothes fit better is not as internally motivating as having a tangible reward.  A good weight loss " season reward is one that isn't food based, is affordable, but something special:  Something you won't be getting/doing unless you achieve your goal.   It s important to keep to the rule of success:  in order to get the reward, your goal MUST be achieved. Write this reward down, where you can look at it daily and keep it in the front of your mind as you go through your weight loss season and it will help keep you on track.    Tools that help change behavior are vital for success. The most studied and most supported tool for weight loss is nothing more than writing down your food and weight every day.  Every Day.  Accurately and completely.  When you commit to weight loss season, this information tells you whether you're getting ENOUGH food to fuel your weight loss properly as well as teaches you the interaction between different foods you eat and how your body responds with weight loss.  You'll see that sometimes after a heavy workout you don't see the scale move until 2-3 days later.  How saltier meals (chili for example) may make you retain water for 4-5 days before you see the weight come off, you'll get used to the mini-plateaus that develop after a good 3-8 lb drop in weight as well as how you break through if you keep working the diet as you should.    Weight loss is not a linear process, there are mini ups and downs.  Learning how your body loses weight and getting comfortable with that is very rewarding. The act of writing words on paper also solidifies your will power and commitment to the season of weight loss and that by itself changes your brain chemistry/appetite, motivation and prepares you for maximal success.     Behavior change is all about getting into a new routine.  The old habits and routine have to change because without changing the circumstances of how you gained your weight, it's unlikely you'll enjoy satisfying results. If you have snacking habits, like every time you walk through the  "kitchen you grab a little something, well, that habit has to change and be replaced by a new habit.  It can be something as simple as keeping a doodle pad on the counter that you make a few scribbles and then walk through the kitchen having not opened the cupboard, or starting with a glass of water and leaving the kitchen without anything else, or checking your food journal to see how many calories you have left for the day.  Boredom is the enemy as are the old habits. Break new ground and try to push those old habits into a deep hole.  There are apps/counseling options available that can help with some of the day to day urges/behaviors if you're struggling. One commercial product that does a good job is Noom.  Unfortunately, there is a subscription fee.    Finally, exercise always helps.  While not mandatory to lose weight, every little bit helps and exercise has so many other benefits that to not work it into your plan is to miss out on all the mood, sleep, stress and general health benefits that come from making yourself a little short of breath and sweaty at least 3-4 days out of the week.  The metabolism and calorie burn benefits aside, almost every chronic ailment in medicine gets better with proper, aerobic exercise.  Allow yourself to start slow and let your body prepare itself to accept harder training 4-6 weeks down the road, but start now and commit to a plan.  Whether you have the means to hire a , join a gym or just walk out your front door or go down to your basement for a video workout, get into a exercise routine and  after 3 weeks of at least 3 times a week exercise you should be at a point where you can slowly start ramping up 10% each week to our goal of at least 150-300minutes weekly of aerobic exercise and at least twice weekly resistance training/strenghtening with weights/bands or body weight exercises.     I am a big fan of modifying the free training plan, \"Couch to 5k\", for almost " "all of my patients. Just type it into Google or look it up on your smart phone roland store.  To modify the plan,  you can use the training plan for whatever aerobic activity you do (bike/treadmill/elliptical/rower/pool/etc). During the \"jog\" intervals, you just move a little faster or harder or increase the tension or incline.  You use those little intervals to switch up the workout and recruit more muscles and pump the blood a little more and then recover again in the \"walk\" intervals by slowing down, decreasing the incline or turning down the tension.  3-4 days a week is not that much to ask and the benefits are enormous.  Start slow and develop the base from which you can then build on and reduce the risk of injury.  It's much more important 2-4 months from now to be enjoying your exercise then it is to over exert yourself at the start and hurt yourself.  Starting slowly allows your body to accept the training better down the road when the exercise becomes crucial for weight maintenance.  Without exercise down the road during your maintenance phase, all this hard work you are about to put in can be undone. It usually takes about 100-300 calories a day of exercise to maintain a weight loss and our focus during weight loss season is to generate the routine/activities and hobbies that make that enjoyable/sustainable.    Thanks for taking this first and most important step in your weight loss season.  Commit to it and we will cheerlead you all the way to success.  When things get tough or off track we'll offer guidance and analysis and when you reach your goal we'll celebrate your success.  In the end, it is all about your success, your health and what you do with it.      Wei Ceballos MD  [facility]  [phone]  Example Meal Plan for a 8726-4045 Calorie Diet:    In order to fuel your weight loss properly and avoid hunger-induced overeating later in the day, for your height and " weight, you will enjoy the most success by following the diet below or similar with adjustments based on your particular tastes and preferences.  Exercise may influence speed, amount of weight loss further.     I recommend getting into a meal routine and keeping it similar day to day in the beginning so you don t have to think too hard about what you re going to make/eat.  Keep snacks healthy, ideally containing protein and some vegetables.  Non-processed food is preferable to packaged items.  Eat at least a few crunchy green vegetables if having a snack, which should be 2-3 hours after your mealtimes(prepare these ahead of time for ease of use).  Drink 64 oz -80 oz of water daily for most, some of you will need more and we'll discuss it at your visit if that is the case.      When changing our diet,  we can often mistake thirst for hunger or just have some distracted eating habits that we need to break free from ('bored/mindless eating', screen time,work, driving,etc).  A glass of water and reconsideration of our hunger is often all that is needed.  Having the urge is not the problem, but watching it pass by without acting on it is the goal.    If you re having hunger problems, add a protein drink/snack to your morning hours or afternoon snack with at least 20grams of protein and not too much sugar (under 10g).  A carton of higher protein/low sugar yogurt can work as well.  If the urge to snack is overwhelming and not satiated, try going for a 10 minute walk/exercise, come home and drink a glass of water and if still hungry, have a  calorie snack (handful of raw/sprouted nuts, veggies and string cheese, protein bar, etc).  Savor it.    It is better to have a large breakfast, a moderate lunch and a smaller dinner to fuel your day.  People lose 10-15% more weight during their weight loss season with this strategy. Optimizing your protein intake at each meal will further keep you more satisfied while eating less  food overall.  Getting exercise in early has also been shown to offer the best results (before breakfast ideally but anytime is the right time to exercise if that is not an option for you).    To make sure you re getting adequate vitamins and minerals during weight loss, I recommend one complete multivitamin a day of your choice.  Consider a probiotic and taking some vitamin D 2000 IU daily.    Let supper be your last meal of the day and ideally try to have at least 12 hours between supper and breakfast the next day to tap into some beneficial overnight fasting dynamics.  Midnight snacks need to go away. Water in the evening is fine, unsweetened, non caffeinated herbal tea is helpful as well.  Consolidating your meals within a 8-12 hour period of your day will help tap into these additional metabolic benefits and tends to keep your appetite up for breakfast, further helping to stay on track.  For most of my patients, I don't recommend an intermittent fasting style diet (many find it hard to fit in their lifestyle) but an overnight fast is very doable for most patients and helps regulate our hunger drives a little better.  This makes it very important to nail good intake at all three meals to feel satisfied/energized and still lose weight.      If evening snacking desires are high, consider a glass of fiber supplement for some additional fullness (metamucil or similar). Most of us don't get the 25-30 grams per day of fiber that promotes good gut health/satiety.  Benefiber, metamucil, citrucel are reasonable/affordable options for most people.  Inulin, chicory, psyllium husk are reasonable options but start slow and low in the dose to avoid gas/bloating until your gut gets acclimated (ramping up to 5-10 grams per day of supplemental fiber after 3-4 weeks if needed).      Example Meal Plan:  Breakfast: 450-475 Calories  1 egg cooked on low in olive oil:   calories.  5oz Greek Yogurt (Fage plain classic: ~150  deshaun)  Handful of Berries of your choice (about a calorie per berry or 20-40cal per handful)    cup(cooked) of  old fashioned oatmeal or 1/2 cup(cooked) steel cut oats. (150 deshaun)  Sprinkle amount of brown sugar and a pat of butter. (40 deshaun)  Glass of  Water  Black coffee or unsweetened Tea (0calories).      2-3 hours Later Snack: (195 calories).  Glass of water  One string Cheese (80 calories) or 4 oz creamed cottage cheese (115 calories) with  Crunchy Celery sticks (less than 10 calories per large stalk) 2 stalks. (20 calories)    of a  Large Banana or   of a Large Apple (60 calories):  eat second half at lunch or afternoon snack.     Lunch:300 -350 calories   Chicken Breast  (baked/broiled/roasted/grilled)  4-6 oz.  (125-180 deshaun), BBQ sauce/hot sauce/mustard/seasoning is free. Just use a reasonable amount. Or a can of tuna with 1 tablespoon mayonnaise.  Salad: lettuce, any other veggies (cucumbers, green peppers/celery you like and a small drizzle of dressing to just flavor.  Go as big on the veggies as you like,  as they are practically calorie free.   A whole, 8 inch cucumber is 45 calories, a whole green pepper is 23 calories, a stalk of celery is 9 calories.  [place] Dressing is 60 calories per tablespoon..so moderate your desired dressing or do a drizzle of olive oil and splash of balsamic vinegar on top,  Total calories unlikely to be over 150 even with dressing.  Glass of Water.    Option for lunch is meal replacement protein drink/smoothie.  Need at least 20 grams of protein and eat the rest of your apple/banana from the morning snack.      Afternoon Snack: 150-200 calories   Cheese Stick or cottage cheese again  and a fresh fruit OR  Granola Bar (protein Bar acceptable if under 200 calories OR  Homemade smoothies:  8oz skim milk,  a handful of berries (fresh or frozen and a serving of protein powder such as BiPro or Rhiannon sWhey for example.  If you don't like dairy, make with 8oz water, one small  banana, handful of berries and the protein powder, add any veggies you want as well:  roughly 200 calories.   Glass of Water    Dinner: 325 calories  4oz of fresh, Atlantic salmon.  Broiled (salt/pepper/dill) for about 8-8.5 minutes (200calories) or  4oz filet mignon steak or sirloin steak  Salad or vegetable sautéed lightly in olive oil or   Broccoli 1.5  cups chopped and steamed  or micro-waved in a little water (75 calories)  Glass of Water,    Cup of herbal tea (unsweetened, caffeine free)      Herbs and seasonings are encouraged to flavor your foods/vegetables.  Make your food delicious.      Tips for Success:  1.  Prepare proteins ahead of time (broil chicken breasts in bulk so you can grab and go), steel cut oats/lentils can be stored in casserole dish/bowl in the fridge for quick scoop in the morning and rewarm in microwave, make use of crock pot recipes (watch salt content).  Making meals that cover 3-4 future meals is an easy way to stay on track.  2.  Drink a 8-12 oz glass of water every 2-3 hours when awake.  We often mistake hunger for thirst, especially when losing weight.  3. Remember your Reward and Motivation when things get hard.  4.  Weigh yourself every morning and record, you'll stay on track better and learn how our biorhythms, diet and elimination patterns show up on the scale. Don't worry about 1 or 2 day patterns, but when on track you'll notice good trend downward of weight over 3-4 day segments.  Plateaus tend to resolve after 4-8 days in most cases if you stay consistent with your plan.  These are natural and part of weight loss, even if you're perfect with your plan execution.  5. Call if problems/concerns.  [employer] is a great tool to stay in touch and provide weekly outside accountability. Check in with questions or if you want to brag.  6.  Find a handful of meals/foods that keep you on track and feeling good and get into a routine that is sustainable for you.  It's OK to have a  "routine that works for you.  7.  Consider taking a complete multivitamin just to make sure all micronutrients are adequate during weight loss.  8. If losing hair/brittle nails it usually means you are not taking enough protein.  Minimum goal is 60 grams daily of protein for smaller women, 80 grams a day for men. Consider taking Biotin as supplement or a \"Hair and Nail\" multivitamin.      On-the-Go Breakfast Ideas  As of 2015, the latest research shows what a huge impact eating breakfast has on losing weight and feeling your best. People lose more weight when they make breakfast their biggest meal of the day compared to Dinner, but even if you cannot go to that degree, getting a breakfast that has at least 20 grams of protein and even a moderate amount of fat is ideal for maintaining good energy through the day and limits overeating in the evening hours.  The following are some quick and easy suggestions for at least getting something of substance into your body in the morning.  Enjoy!    Eating breakfast within 90 minutes of waking up is an important part of taking care of your body on a restricted calorie diet plan.  After sleeping for hours, your body is in need of fuel.  An ideal breakfast is a combination of protein, whole-grain carbohydrates, or fruit.  Here s why:    -Protein digests very slowly in the body, helping you feel more satisfied.  -Whole grains provide dietary fiber, which also digests slowly and helps keep your gut clean.  -Fruit is a great source of vitamins, minerals, and fiber.     Each one of these breakfast combinations has between 200-300 calories and 15-20 grams of protein.  Feel free to mix and match!    Bone Broth (chicken bone broth or beef bone broth) is a great way to boost protein content. 8oz of bone broth will typically have 9-12grams of protein for 40kcal of energy.    Protein: Choose  -1/2 cup low-fat cottage cheese  -2 hard boiled eggs , or one cooked in olive oil (low/slow " heat).  -1 low fat string cheese stick  -1 Tablespoon natural peanut butter  -[place] vegetarian sausage krupa (found in freezer section)  -1 slice lowfat cheese  -6 oz 2% or lowfat Greek yogurt, such as Fage or Oikos.    PLUS    Whole Grains:  Choose   -1 whole wheat English muffin  -1 whole wheat keila, half  -1/2 Fiber One frozen muffin, thawed  -1/2 Fiber One toaster pastry  -1 whole wheat bagel thin  -1/2 cup Kashi cereal  -1 Kashi waffle (or other whole grain high-fiber waffle)  Aim for whole grain/sprouted breads with at least 3g of fiber/slice if having bread. [place] is one such brand.    OR    Fruit: Choose  -1/3 cup blueberries  -1/2 banana (or a plantain- similar to a banana, yet smaller)  -1/2 cup cantaloupe cubes  -1 small apple  -1 small orange  -1/2 cup strawberries  -handful raspberries/blackberries (each berry is about 1 calorie).    *Adapted from Diabetes Living, Fall 20    Ten Breakfasts Under 250 calories    Ideally, getting between 350-600 calories  (depending on starting height and weight)for breakfast is ideal for avoiding hunger later in the day, adjust/add to the following accordingly:    One- 250 calories, 8.5 g protein  1 slice whole-grain toast   1 Tbsp peanut butter    banana    Two- 250 calories, 8 g protein    cup nonfat/lowfat yogurt  1/3rd cup diced no-sugar peaches  1/3rd cup cereal (like Special K, Cheerios, or bran flakes)    Three- 250 calories, 25 g protein  1 egg scrambled with 1 oz skim milk    cup shredded cheddar    whole grain English muffin  1 oz [place] russell  1 tsp margarine spread    Four- 225 calories, 25 g protein  1/2 cup Kashi Go-Lean cereal    cup skim milk mixed with 1 scoop Bariatric Advantage protein powder    cup no-sugar diced pears    Five- 250 calories, 20 g protein    cup oatmeal prepared with skim milk, 1 scoop protein powder, and sugar-free maple syrup    Six- 200 calories, 5 g protein  1 whole grain waffle, toasted  1 tablespoon creamy  peanut or almond butter    Seven-  250 calories, 19 g protein  Breakfast sandwich: 1 slice whole grain toast, cut in half.  Add 1 scrambled egg and one slice cheddar  cheese.    Eight-  250 calories, 15 g protein  2 eggs scrambled with 1/3 cup frozen spinach (heat before adding to eggs) and 2 tablespoons low fat cream cheese.    Nine-  150 calories, 15 g protein  2/3rd cup cottage cheese    cup cantaloupe    Ten- 200 calories, 20 g protein  Fruit smoothie made with 4 oz. nonfat Greek yogurt,   cup berries, 1 scoop protein powder, and 4 oz skim milk.    Ten Lunches Under 250 Calories    Aim for lunch to be around 300-400 calories a day when trying to lose weight and get that protein in!    One- 200 calories, 11 g protein  1/3 cup tuna salad made with light anglin on 1 slice whole grain bread  1 small peeled apple    Two- 250 calories, 16 g protein  1/3 cup lowfat cottage cheese    cup cooked green beans    small fruit cocktail (in natural juice)    Three- 200 calories, 11 g protein    grilled cheese sandwich on whole grain bread with lowfat cheese  2/3rd cup of tomato soup    Four- 250 calories, 22 g protein  Deli wrap: 1 oz sliced turkey, 1 oz sliced ham, 1 oz sliced chicken rolled up with 1 slice low-fat cheese  1 small orange    Five- 250 calories, 28 g protein  2/3rd cup chili with 1 oz shredded cheese  4 saltine crackers    Six- 250 calories, 22 g protein  1 cup fresh spinach with 2 oz chicken, 1/3rd cup mandarin oranges, and 2 tablespoons sliced almonds with 1 tablespoon  vinaigrette dressing    Seven- 200 calories, 11 g protein  1 Tbsp sugar-free preserves and 1 Tbsp peanut butter on 1 slice whole grain toast    cup nonfat/lowfat Greek yogurt    Eight- 250 calories, 18 g protein  1 small soft-shell chicken taco with 1 oz shredded cheese, lettuce, tomato, salsa, and 1 Tbsp light sour cream    cup black beans    Nine- 225 calories, 13 g protein  2 ounces baked chicken  1/4 cup mashed potatoes    cup green  beans    Ten- 200 calories, 21 g protein  Deli keila: 2 oz roast beef or other deli meat with 1 tsp [place] mayonnaise and sliced tomato, onion, and lettuce  1/3rd cup cottage cheese      Ten Dinners Under 300 calories    If you're eating a large breakfast and medium lunch, keep dinner small.  300-400 calories is ideal for most people depending on their caloric needs.    One- 300 calories, 12 g protein  1-inch thick slice of turkey meatloaf    cup baked butternut squash    Two- 200 calories, 9 g protein  Bread-less BLT: 3 slices turkey russell, sliced tomato, wrapped in a large lettuce leaf    cup peeled fruit    Three- 275 calories, 36 g protein  3 oz roasted chicken    cup cooked broccoli    cup shredded cheddar cheese    cup unsweetened applesauce    Four- 200 calories, 25 g protein  3 oz baked tilapia  1/3rd cup cooked carrots    cup yogurt    Five- 250 calories, 20 g protein  Grilled ham  n  Swiss: spread 2 tsp ghee or butter on 1 slice of whole grain bread.  Cut bread in half, layer 2 oz deli ham with 1 piece of Swiss cheese and grill until cheese is melted.    cup cooked vegetables    Six- 250 calories, 18 g protein  Vegetarian cheeseburger: 1 [place] cheeseburger topped with lettuce, onion, tomato, and ketchup/mustard    cup sweet potato fries    Seven- 250 calories, 18 g protein  Pork pot roast: 2 oz roasted pork loin, 1/3rd cup roasted carrots,   medium potato, cooked with   cup gravy    Eight- 330 calories, 25 g protein  2 oz meatballs (about 2 small meatballs)    cup spaghetti sauce  1/2 piece toast topped with 1 tsp ghee or butterand topped with garlic powder, toasted in oven    Nine- 250 calories, 16 g protein  Mexican pizza: one 8  corn tortilla topped with 2 oz chicken,   cup salsa, 2 tablespoons black beans, 2 tablespoons shredded cheese.  Bake until cheese is melted.    Ten- 250 calories, 22 g protein  Shrimp stir-mcfadden: 3 oz cooked shrimp, 1/6th onion,   pepper,   cup chopped carrots sautéed in 1 tablespoon  olive oil, topped with 2 tablespoons stir mcfadden sauce and a pinch of sesame seeds        150 Calories or Less Snack Ideas   1 hardboiled egg with   cup berries  1 small apple with 1 hardboiled egg  10 almonds with   cup berries  2 clementines with 1 light string cheese  1 light string cheese with   sliced apple  1 light string cheese wrapped in 2 slices of turkey  4 100% whole wheat crackers (e.g. Triscuit) with 1 light string cheese    c. cottage cheese with   cup fruit and 1 Tbsp sunflower seeds     cup cottage cheese with   of an avocado     can tuna fish with 1 cup sliced cucumbers     cup roasted garbanzo beans with paprika and cayenne pepper    baked sweet potato with   cup chili beans or   cup cottage cheese  2 oz. nitrate free turkey slices with 1 cup carrots  1 container (6 oz) of low sugar (less than 10 grams of sugar) greek yogurt   3 Tablespoons of hummus with 1 cup sliced bell peppers   2 Tablespoons of hummus with 15 baby carrots  4 Tablespoons ranch dip made with plain Greek Yogurt and 3 mini cucumbers  1/4 cup nuts (any kind)  1 Tablespoon peanut butter with 1 stalk celery   1 dill pickle wrapped in 1-2 slices of deli ham with 1 tsp of mayonnaise/mustard.      LEAN PROTEIN SOURCES  Getting 20-30 grams of protein, 3 meals daily, is appropriate for most people, some need more but more than about 40 grams per meal is not useful.  General rule is drinking one ounce of water per gram of protein eaten over the course of the day:  70 grams of protein each day, drink 70 oz of water.  Protein Source Portion Calories Grams of Protein                           Nonfat, plain Greek yogurt    (10 grams sugar or less) 3/4 cup (6 oz)  12-17   Light Yogurt (10 grams sugar or less) 3/4 cup (6 oz)  6-8   Protein Shake 1 shake 110-180 15-30   Skim/1% Milk or lactose-free milk 1 cup ( 8 oz)  8   Plain or light, flavored soymilk 1 cup  7-8   Plain or light, hemp milk 1 cup 110 6   Fat Free or 1%  Cottage Cheese 1/2 cup 90 15   Part skim ricotta cheese 1/2 cup 100 14   Part skim or reduced fat cheese slices 1 ounce 65-80 8     Mozzarella String Cheese 1 80 8   Canned tuna, chicken, crab or salmon  (canned in water)  1/2 cup 100 15-20   White fish (broiled, grilled, baked) 3 ounces 100 21   [place]/Tuna (broiled, grilled, baked) 3 ounces 150-180 21   Shrimp, Scallops, Lobster, Crab 3 ounces 100 21   Pork loin, Pork Tenderloin 3 ounces 150 21   Boneless, skinless chicken /turkey breast                          (broiled, grilled, baked) 3 ounces 120 21   [place], [place], [place], and Venison 3 ounces 120 21   Lean cuts of red meat and pork (sirloin,   round, tenderloin, flank, ground 93%-96%) 3 ounces 170 21   Lean or Extra Lean Ground Turkey 1/2 cup 150 20   90-95% Lean [place] Burger 1 krupa 140-180 21   Low-fat casserole with lean meat 3/4 cup 200 17   Luncheon Meats                                                        (turkey, lean ham, roast beef, chicken) 3 ounces 100 21   Egg (boiled, poached, scrambled) 1 Egg 60 7   Egg Substitute 1/2 cup 70 10   Nuts (limit to 1 serving per day)  3 Tbsp. 150 7   Nut [place] (peanut, almond)  Limit to 1 serving or less daily 1 Tbsp. 90 4   Soy Burger (varies) 1  15   Garbanzo, Black, [place] Beans 1/2 cup 110 7   Refried Beans 1/2 cup 100 7   Kidney and Lima beans 1/2 cup 110 7   Tempeh 3 oz 175 18   Vegan crumbles 1/2 cup 100 14   Tofu 1/2 cup 110 14   Chili (beans and extra lean beef or turkey) 1 cup 200 23   Lentil Stew/Soup 1 cup 150 12   Black Bean Soup 1 cup 175 12

## 2025-07-23 NOTE — Clinical Note
Dr. Patel,  I saw Ms Bernal today for weight management but see that she'd just had CT scan with pelvic mass that required further workup so placed the ultrasound and GYN consult accordingly.   As far as weight management goes, she's not interested in diabetic meds/injections but was open to a trial of naltrexone (didn't want metformin).   I'll keep you posted on her weight management but wanted to let you know about the need for pelvic mass follow up.  Kind Regards, Wei Ceballos MD [facility]

## 2025-07-23 NOTE — LETTER
"2025      Rosalba Bernal  [address]  [facility] [address]      Dear Colleague,    Thank you for referring your patient, Rosalba Bernal, to the [facility] [place]. Please see a copy of my visit note below.    New Medical Weight Management Consult    PATIENT:  Rosalba Bernal  MRN:         [id number]  :         1955  DMITRIY:         2025        I had the pleasure of seeing your patient, Rosalba Bernal. Full intake/assessment was done to determine barriers to weight loss success and develop a treatment plan. Rosalba Bernal is a 69 year old female interested in treatment of medical problems associated with excess weight. She has a height of 5' 1\", a weight of 252 lbs 3.2 oz, and the calculated Body mass index is 47.65 kg/m .  Co-morbid metabolic syndrome with borderline type II diabetes (A1c of 6.5% 10/10/24 and diffuse hepatic steatosis w/ hepatomegaly on her 25 CT scan (adenexal masses in need of further workup/pending) and ventral hernias that cannot be reliably/durably repaired until BMI under 36. Her pelvic mass hasn't been evaluated yet so referrals placed today and order for recommended pelvic ultrasound to better characterize. Dr. Patel, her PCP, messaged today regarding these finding/referrals.     Rosalba is a patient with mature onset class III, morbid obesity with significant element of familial/genetic influence and with current health consequences. She does need aggressive weight loss plan due to hepatic steatosis and type II diabetes/metabolic syndrome evolution in the last year.  Rosalba Bernal eats a high carb diet, uses food as a reward, uses food as mood management, mostly eats during the evening, has a disorganized meal pattern, and is on disability making some financial strain on food choices..      Her problem is complicated by a hunger disorder, strong craving/reward pathways, mental " health/psychopharmacological barriers, gender and short stature, impaired metabolic perception, and limitations on activity due to hernia issues      Review of the patient's history and habits today suggest that weight gain has been long standing marquez. David has contributed to her weight gain. .    Previous Interventions found to be helpful in the past for weight loss include some recent dietary changes.    We discussed a toolbox approach to weight management today and she is open to combining mindful, reduced calorie dietary therapy with increased mindfulness techniques, activity/exercise improvements to optimize their current and future health.  Medication assistance for appetite control was discussed today and on review of the risks/benefits for this patients health history, we've decided to start with appetite suppressant therapy.  I tried to advocate for a trial of Ozempic or Mounjaro in light of her evolving diabetes but she is adamant that she will never use these or metformin. This seems to be to delusional level of aversion/fear of these medications. She's not a candidate for bariatric surgery due to her aversion as well as mental health concerns.     She was open to a trial of anti-craving medication, naltrexone. With psychiatric med weight gain, it's often best in combination with metformin but solo therapy may offer some benefits for her. If requiring any operative intervention for her pelvic mass, she can hold naltrexone 10 days prior to such procedures.     ASSESSMENT AND PLAN  Problem List Items Addressed This Visit    None  Visit Diagnoses         Class 3 severe obesity due to excess calories with body mass index (BMI) of 40.0 to 44.9 in adult (H)    -  Primary    Relevant Orders    Comprehensive metabolic panel    TSH    Vitamin B12    25- OH-Vitamin D      Ventral hernia without obstruction or gangrene          Hepatic steatosis          Borderline diabetes        Relevant Orders     Comprehensive metabolic panel    TSH    Vitamin B12    25- OH-Vitamin D      Pelvic mass        Relevant Orders    Ob/Gyn  Referral    US Pelvic Complete with Transvaginal           Plan:  Welcome to your weight loss season. To start, we'll look to help improve your diabetes and hepatic steatosis and get you to a point where your hernias can be durable repaired if requiring fixing in the future.     2. To lose weight, we'd aim for 1400kcal/day with 75 grams of lean protein daily and we'll have you work with our dieticians for improvements in both weight and diabetes care.     3. Looking at your CT scan from last week, pelvic mass needs more workup so referral to GYN placed and pelvic ultrasound placed. I'll message Dr. Patel about these findings.     4. Weight loss should improve fatty liver disease once below 200 lbs.     5. Given your reluctance for use of Ozempic or Mounajaro or Metformin, we'll start some naltrexone therapy to reduce cravings. If poorly tolerated, we could look at some options but we cannot use bupropion, topamax or phentermine products for fear of mood destabilization.    Stop the naltrexone if any need for surgery or opiate pain medications. Start half a tablet with supper for 10 days then increase, if tolerating it well, to half a tablet with 2 meals daily (breakfast/supper or lunch/supper).     6. Check labs today.   If needing food shelf assistance to reduce costs look at this web site:  https://www.hungersolutions.org/find-help/      60 minutes spent by me on the date of the encounter doing chart review, history and exam, documentation and further activities per the note        She has the following co-morbidities:        7/23/2025    10:20 AM   --   I have the following health issues associated with obesity Type II Diabetes    Pre-Diabetes    High Blood Pressure    High Cholesterol   I have the following symptoms associated with obesity Back Pain           7/23/2025    10:20  "AM   Patient Goals   If yes, please indicate which surgery? Hernia           7/23/2025    10:20 AM   Referring Provider   Please name the provider who referred you to Medical Weight Management  If you do not know, please answer \"I Don't Know\" Alfredo Westy           7/23/2025    10:20 AM   Weight History   How concerned are you about your weight? Very Concerned   I became overweight After Pregnancy   The following factors have contributed to my weight gain Mental Health Issues    Started on Medication that Caused Weight Gain    Eating Wrong Types of Food    Lack of Exercise    Genetic (Runs in the Family)    Stress   My lowest weight since age 19 was 126   My highest weight since age 19 was 265   The most weight I have ever lost was (lbs) 18   I have the following family history of obesity/being overweight One or more of my siblings are overweight   How has your weight changed over the last year? Lost   How many pounds? 18           7/23/2025    10:20 AM   Diet Recall Review with Patient   If you do eat breakfast, what types of food do you eat? egg mcmuffin   If you do eat lunch, what types of food do you typically eat? chicken, green beans   If you do eat supper, what types of food do you typically eat? chicken, green beans   If you do snack, what types of food do you typically eat? pb sandwich   How many glasses of juice do you drink in a typical day? 0   How many of glasses of milk do you drink in a typical day? 1   If you do drink milk, what type? N/A   How many 8oz glasses of sugar containing drinks such as Keith-Aid/sweet tea do you drink in a day? 0   How many cans/bottles of sugar pop/soda/tea/sports drinks do you drink in a day? 0   How many cans/bottles of diet pop/soda/tea or sports drink do you drink in a day? 0   How often do you have a drink of alcohol? Never           7/23/2025    10:20 AM   Eating Habits   Generally, my meals include foods like these bread, pasta, rice, potatoes, corn, crackers, " sweet dessert, pop, or juice Everyday   Generally, my meals include foods like these fried meats, brats, burgers, french fries, pizza, cheese, chips, or ice cream Less Than Weekly   Eat fast food (like McDonalds, Burger Murali, Taco Bell) A Few Times a Week   Eat at a buffet or sit-down restaurant Never   Eat most of my meals in front of the TV or computer Everyday   Often skip meals, eat at random times, have no regular eating times Never   Rarely sit down for a meal but snack or graze throughout Less Than Weekly   Eat extra snacks between meals Never   Eat most of my food at the end of the day Less Than Weekly   Eat in the middle of the night or wake up at night to eat Never   Eat extra snacks to prevent or correct low blood sugar Never   Eat to prevent acid reflux or stomach pain Never   Worry about not having enough food to eat Never   I eat when I am depressed Never   I eat when I am stressed Never   I eat when I am bored Never   I eat when I am anxious Never   I eat when I am happy or as a reward Less Than Weekly   I feel hungry all the time even if I just have eaten Never   Feeling full is important to me Everyday   I finish all the food on my plate even if I am already full Never   I can't resist eating delicious food or walk past the good food/smell Everyday   I eat/snack without noticing that I am eating Never   I eat when I am preparing the meal Never   I eat more than usual when I see others eating Never   I have trouble not eating sweets, ice cream, cookies, or chips if they are around the house Everyday   I think about food all day Never   What foods, if any, do you crave? Cheese   Please list any other foods you crave? pastry           7/23/2025    10:20 AM   Amount of Food   I feel out of control when eating Never   I eat a large amount of food, like a loaf of bread, a box of cookies, a pint/quart of ice cream, all at once Never   I eat a large amount of food even when I am not hungry Never   I eat  rapidly Never   I eat alone because I feel embarrassed and do not want others to see how much I have eaten Never   I eat until I am uncomfortably full Never   I feel bad, disgusted, or guilty after I overeat Never           7/23/2025    10:20 AM   Activity/Exercise History   How much of a typical 12 hour day do you spend sitting? Most of the Day   How much of a typical 12 hour day do you spend lying down? Half the Day   How much of a typical day do you spend walking/standing? Less Than Half the Day   How many hours (not including work) do you spend on the TV/Video Games/Computer/Tablet/Phone? 6 Hours or More   How many times a week are you active for the purpose of exercise? Never   What keeps you from being more active? Other   How many total minutes do you spend doing some activity for the purpose of exercising when you exercise? None       PAST MEDICAL HISTORY:  Past Medical History:   Diagnosis Date     Diabetes mellitus (H)            7/23/2025    10:20 AM   Work/Social History Reviewed With Patient   My employment status is Disabled   What is your marital status? Single   If you have children, are they overweight? No   Who do you live with? Myself   Who does the food shopping? Myself           7/23/2025    10:20 AM   Mental Health History Reviewed With Patient   Have you ever been physically or sexually abused? No   How often in the past 2 weeks have you felt little interest or pleasure in doing things? Not at all   Over the past 2 weeks how often have you felt down, depressed, or hopeless? Not at all           7/23/2025    10:20 AM   Sleep History Reviewed With Patient   How many hours do you sleep at night? 8       Past Surgical History:   Procedure Laterality Date     HC REMOVAL OF TONSILS,<12 Y/O      Description: Tonsillectomy;  Recorded: 11/19/2007;     HYSTERECTOMY      when she was 27 years old.      [facility] TOTAL ABDOM HYSTERECTOMY      Description: Total Abdominal Hysterectomy;  Recorded: 11/19/2007;   Comments: no oophorectomy       SLEEP:      7/23/2025     9:59 AM    Lyons Sleepiness Scale ( SREEDHAR Alcantara  [id number]<br>ESS - [place]/English - Final version - 21 Nov 07 - [facility] [place].)   Sitting and reading Would never doze   Watching TV Would never doze   Sitting, inactive in a public place (e.g. a theatre or a meeting) Would never doze   As a passenger in a car for an hour without a break Would never doze   Lying down to rest in the afternoon when circumstances permit Slight chance of dozing   Sitting and talking to someone Would never doze   Sitting quietly after a lunch without alcohol Would never doze   In a car, while stopped for a few minutes in traffic Would never doze   Lyons Score (MC) 1   Lyons Score (Sleep) 1        Proxy-reported       STOP-BANG:  Do you SNORE loudly (louder than talking or loud enough to be heard through closed doors)?: (Proxy-Rptd) No  Do you often feel TIRED, fatigued, or sleepy during daytime?: (Proxy-Rptd) Yes  Has anyone OBSERVED you stop breathing during your sleep?: (Proxy-Rptd) No  Do you have or are you being treated for high blood PRESSURE?: (Proxy-Rptd) Yes  BMI more than 35kg/m2?: (Proxy-Rptd) Yes  AGE over 50 years old?: (Proxy-Rptd) Yes  NECK circumference > 16 inches (40cm)?: (Proxy-Rptd) Yes  GENDER: Male?: (Proxy-Rptd) No  Total Score: (Proxy-Rptd) 5       Social History     Socioeconomic History     Marital status: Single     Spouse name: Not on file     Number of children: Not on file     Years of education: Not on file     Highest education level: Not on file   Occupational History     Not on file   Tobacco Use     Smoking status: Former     Current packs/day: 1.00     Average packs/day: 1 pack/day for 28.0 years (28.0 ttl pk-yrs)     Types: Cigarettes     Passive exposure: Never     Smokeless tobacco: Never     Tobacco comments:     quit apx 35 yrs ago   Vaping Use     Vaping status: Never Used   Substance and Sexual Activity     Alcohol use: Yes      Comment: Alcoholic Drinks/day: a beer every 4 months     Drug use: Not Currently     Comment: Drug use: psychodelics     Sexual activity: Not Currently   Other Topics Concern     Not on file   Social History Narrative    Lives alone. She sees her older sister quite a bit. She likes to go to [employer]. Been on disability because schizophrenia. 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   Has 2 children - sons who are  and she sees [facility] here and there. Has 4 grandsons.   .      Social Drivers of Health     Financial Resource Strain: Not on file   Food Insecurity: Not on file   Transportation Needs: Not on file   Physical Activity: Not on file   Stress: Not on file   Social Connections: Not on file   Interpersonal Safety: Low Risk  (5/16/2025)    Interpersonal Safety      Do you feel physically and emotionally safe where you currently live?: Yes      Within the past 12 months, have you been hit, slapped, kicked or otherwise physically hurt by someone?: No      Within the past 12 months, have you been humiliated or emotionally abused in other ways by your partner or ex-partner?: No   Housing Stability: Not on file       MEDICATIONS:   Current Outpatient Medications   Medication Sig Dispense Refill     amLODIPine (NORVASC) 5 MG tablet Take 1 tablet (5 mg) by mouth once daily. 90 tablet 0     azelastine (ASTELIN) 0.1 % nasal spray Spray 1 spray into both nostrils 2 times daily 30 mL 1     diphenhydrAMINE (BENADRYL) 25 mg capsule [DIPHENHYDRAMINE (BENADRYL) 25 MG CAPSULE] Take 25 mg by mouth at bedtime.              fexofenadine (ALLEGRA) 60 MG tablet [FEXOFENADINE (ALLEGRA) 60 MG TABLET] Take 60 mg by mouth daily.       fish oil-omega-3 fatty acids 1000 MG capsule Take 2 capsules by mouth       hydrochlorothiazide (HYDRODIURIL) 25 MG tablet TAKE ONE TABLET BY MOUTH ONCE DAILY 90 tablet 2     ibuprofen (ADVIL) 200 MG tablet [IBUPROFEN (ADVIL) 200 MG TABLET] Take 200 mg by  "mouth every 6 (six) hours as needed for pain.       latanoprost (XALATAN) 0.005 % ophthalmic solution instill 1 drop into Both Eyes every evening       losartan (COZAAR) 50 MG tablet Take 2 tablets (100 mg) by mouth daily. 180 tablet 3     mupirocin (BACTROBAN) 2 % external ointment Apply a pea sized amount to inside of each nostril 3x daily with Qtip for 14 days 22 g 0     naproxen sodium (ALEVE) 220 MG tablet [NAPROXEN SODIUM (ALEVE) 220 MG TABLET] Take 220 mg by mouth 2 (two) times a day with meals.       phenazopyridine (PYRIDIUM) 200 MG tablet Take 1 tablet (200 mg) by mouth 3 times daily as needed for irritation or pain 6 tablet 0     pravastatin (PRAVACHOL) 80 MG tablet TAKE 1 TABLET (80 MG) BY MOUTH ONCE DAILY 90 tablet 1     risperiDONE (RISPERDAL) 4 MG tablet Take 4 mg by mouth daily        timolol maleate (TIMOPTIC) 0.5 % ophthalmic solution Instill 1 drop in Both Eyes twice a day       valACYclovir (VALTREX) 500 MG tablet Take 1 tablet (500 mg) by mouth daily. 90 tablet 3     Risperdal is quite obesogenic.   ALLERGIES:   Allergies   Allergen Reactions     Cat Dander [Animal Dander] Other (See Comments)     Sneezing, Nasal Congestion   Last Comprehensive Metabolic Panel:  Lab Results   Component Value Date     05/16/2025    POTASSIUM 3.6 05/16/2025    CHLORIDE 98 05/16/2025    CO2 26 05/16/2025    ANIONGAP 12 05/16/2025     (H) 05/16/2025    BUN 16.6 05/16/2025    CR 1.1 (H) 07/19/2025    GFRESTIMATED 54 (L) 07/19/2025    BRITTA 9.6 05/16/2025       Liver Function Studies -   Recent Labs   Lab Test 05/16/25  1137   PROTTOTAL 6.7   ALBUMIN 4.1   BILITOTAL 0.4   ALKPHOS 79   AST 26   ALT 25     No results found for: \"TSH\"  Lab Results   Component Value Date    A1C 6.3 05/16/2025    A1C 6.5 10/10/2024    A1C 5.8 10/12/2023    A1C 6.1 10/06/2022    A1C 6.1 10/01/2021     Recent Labs   Lab Test 10/10/24  1148 04/05/24  1351   CHOL 151 140   HDL 38* 41*   LDL 82 68   TRIG 157* 153*     Narrative & " Impression   EXAM: CT ABDOMEN PELVIS W CONTRAST  LOCATION: [facility]  DATE: 7/19/2025     INDICATION: Ventral hernia without obstruction or gangrene  COMPARISON: None.  TECHNIQUE: CT scan of the abdomen and pelvis was performed following injection of IV contrast. Multiplanar reformats were obtained. Dose reduction techniques were used.  CONTRAST: Isovue 370, 90 mL IV     FINDINGS:   LOWER CHEST: Visualized lungs are clear. Trace bilateral pleural effusions. Heart size upper normal with no pericardial effusion.      HEPATOBILIARY: Diffuse hepatic steatosis and borderline hepatomegaly. Liver is otherwise normal.  Gallbladder unremarkable with no visible stone or sludge material.  No  bile duct dilatation.      PANCREAS: Normal.     SPLEEN: Spleen size normal.     ADRENAL GLANDS: Benign 1 cm myelolipoma right adrenal gland requires no follow-up. Adrenal glands otherwise normal.     KIDNEYS/BLADDER: Kidneys, ureters and bladder are normal.     BOWEL AND ABDOMINAL WALL: There are two discrete supraumbilical and umbilical ventral hernias as follows.     A 3.5 x 2.5 cm midline abdominal wall defect just superior to the umbilicus (axial image 104, sagittal image 80, coronal image 33) through which a 10 x 8 x 5 cm fat-containing hernia sac passes.     A 5.0 x 4.5 cm abdominal wall defect at the umbilicus (sagittal image 83, coronal image 35) through which a 13 x 11 x 8 cm hernia sac passes that contains multiple loops of unobstructed small bowel and mesenteric fat.     Very mild uncomplicated acute versus subacute diverticulitis proximal sigmoid colon (series 3 axial images 120-125, coronal image 60, sagittal image 98) with no abscess, obstruction or perforation. No free fluid or free air. Appendix not visualized but   nothing for appendicitis.     LYMPH NODES: No lymphadenopathy.     VASCULATURE: Normal caliber abdominal aorta.       PELVIC ORGANS: Hysterectomy. Indeterminate 10 x 7 x 6 cm right  "adnexal mass and 5 x 4 x 3 cm left adnexal mass.     MUSCULOSKELETAL: Spondylotic change lumbar spine. No bone lesion or fracture.                                                                       IMPRESSION:   1.  Indeterminate 10 x 7 x 6 cm right adnexal mass and 5 x 4 x 3 cm left adnexal mass. Recommend pelvic ultrasound and gynecology consultation.  2.  There are two discrete supraumbilical and umbilical ventral hernias as described above.  3.  Very mild uncomplicated acute versus subacute diverticulitis proximal sigmoid colon.  4.  Diffuse hepatic steatosis and borderline hepatomegaly.  5.  Trace bilateral pleural effusions.      GYN consult is pending as is pelvic ultrasound, orders placed today as none were in chart on review today.      FFM of 116.2 lbs, BMR of 1686kcal/day total body water% 35.5%, visceral fat rating of 19 is upper end of moderate severity.   PHYSICAL EXAM:  Objective   /74   Ht 1.549 m (5' 1\")   Wt 114.4 kg (252 lb 3.2 oz)   LMP  (LMP Unknown)   BMI 47.65 kg/m    /74   Ht 1.549 m (5' 1\")   Wt 114.4 kg (252 lb 3.2 oz)   LMP  (LMP Unknown)   BMI 47.65 kg/m    Body mass index is 47.65 kg/m .  Physical Exam   GENERAL: alert and no distress. A bit unkempt. Dry skin to brows. Dandruff in hair. Slight discoloration of the tip of her nose/blanching. Central adiposity and short stature.   NECK: no adenopathy, no asymmetry, masses, or scars  RESP: lungs clear to auscultation - no rales, rhonchi or wheezes  CV: regular rate and rhythm, normal S1 S2, no S3 or S4, no murmur, click or rub, no peripheral edema  ABDOMEN: central adiposity.   MS: no gross musculoskeletal defects noted, ambulates without assistance.   Affect is guarded, suspicious of recommendations but can be reasoned with. She is not open to medications that she's not comfortable with.         Sincerely,    Wei Ceballos MD            Again, thank you for allowing me to participate in the care of your patient.  "       Sincerely,        Wei Ceballos MD    Electronically signed

## 2025-07-24 ENCOUNTER — TELEPHONE (OUTPATIENT)
Dept: SURGERY | Facility: CLINIC | Age: 70
End: 2025-07-24
Payer: MEDICARE

## 2025-07-24 NOTE — TELEPHONE ENCOUNTER
Medication Question or Refill    Contacts       Contact Date/Time Type Contact Phone/Fax    07/24/2025 03:31 PM CDT Phone (Incoming) Rosalba Bernal (Self) [phone] (H)            What medication are you calling about (include dose and sig)?: Naltrexone    Preferred Pharmacy:   [id number] - [place], [place] - [address]  [place] [address]  Phone: [phone] Fax: [phone]      Controlled Substance Agreement on file:   CSA -- Patient Level:    CSA: None found at the patient level.       Who prescribed the medication?: Wei Ceballos    Do you need a refill? No, this is the first time pt. Will take this prescription.    When did you use the medication last? N/A    Patient offered an appointment? No    Do you have any questions or concerns?  Yes: Pt. saw Wei Ceballos on the 23rd of July and Pt. States that she is supposed to  the prescription with her other medications on the 25th. Please call in the prescription.       Okay to leave a detailed message?: Yes at Cell number on file:  [phone]  No relevant phone numbers on file.

## 2025-07-24 NOTE — TELEPHONE ENCOUNTER
Spoke to the patient and let her know that we are waiting on Dr. Ceballos to give the official signature on the medication so she can get started.  She will wait.    Lakisha Bowen RN

## 2025-07-28 ENCOUNTER — ALLIED HEALTH/NURSE VISIT (OUTPATIENT)
Dept: SURGERY | Facility: CLINIC | Age: 70
End: 2025-07-28
Payer: MEDICARE

## 2025-07-28 VITALS — BODY MASS INDEX: 47.14 KG/M2 | HEIGHT: 61 IN | WEIGHT: 249.7 LBS

## 2025-07-28 DIAGNOSIS — E66.01 MORBID OBESITY WITH BMI OF 45.0-49.9, ADULT (H): ICD-10-CM

## 2025-07-28 DIAGNOSIS — E11.42 TYPE 2 DIABETES MELLITUS WITH DIABETIC POLYNEUROPATHY, WITHOUT LONG-TERM CURRENT USE OF INSULIN (H): Primary | ICD-10-CM

## 2025-07-28 DIAGNOSIS — Z71.3 NUTRITIONAL COUNSELING: ICD-10-CM

## 2025-07-28 PROCEDURE — 97802 MEDICAL NUTRITION INDIV IN: CPT | Mod: GZ | Performed by: DIETITIAN, REGISTERED

## 2025-07-28 RX ORDER — NALTREXONE HYDROCHLORIDE 50 MG/1
TABLET, FILM COATED ORAL
Qty: 90 TABLET | Refills: 0 | Status: SHIPPED | OUTPATIENT
Start: 2025-07-28

## 2025-07-28 NOTE — PATIENT INSTRUCTIONS
10/10 Rule and Diabetes Plate Method printed for patient      Eat Better ? Move More ? Live Well    Eat 3 nutrient-rich meals each day     Don't skip meals--it will cause you to overeat later in the day!     Eating fiber (vegetables/fruits/whole grains) and protein with meals helps you stay full longer     Choose foods with less than 10 grams of sugar and 5 grams of fat per serving to prevent excess calories and weight re-gain   Eat around the same times each day to develop a routine eating schedule    Avoid snacking unless physically hungry.   Planned snacks: 1-2 times per day and no more than 150 calories    Eat protein first    Protein helps with healing, maintaining adequate muscle mass, reducing hunger and optimizing nutritional status    Aim for 75 grams of protein per day   Fill up on Fiber    Fiber comes from plants--fruits, veggies, whole grains, nuts/seeds and beans    Fiber is low in calories, high in phytonutrients and helps you stay full longer    Aim for 25-35 grams per day by eating fiber with meals and snacks  Eat [id number]    Take 20-30 minutes to eat each meal by taking small bites, chewing foods to applesauce consistency or 20-30 times before you swallow    Eating foods too fast can delay satiety/fullness signals and increase overeating   Slow down your eating by using toddler utensils, putting your fork/spoon down between bites and not watching TV or emailing during meals!   Keep a Journal          Writing down what you eat, how you feel and when you are active helps you identify new changes to work on from week to week          Look for ways to cut 100 calories from your current diet 2-3 times per day  Drink 64 ounces of 0-Calorie drinks between meals    Water    Zero calorie Propel  or Vitamin Water      SoBe [place]  Zero Calories    Crystal Light , Sugar-Free Keith-Aid , and other sugar-free lemonade or flavored ayala    Keep Caffeine to less than 300mg per day ie: 3-6oz cups coffee      Work up to 45-60 minutes of physical activity most days of the week    Helps with losing weight and prevent regaining those extra pounds!     Do a combo of cardio (walking/water exercises) and strength training (lifting weights/Vinyasa yoga)    Avoid Mindless Eating    Be present when you eat--take note of the smell, taste and quality of your food    Make a list of alternative activities you could do to prevent eating out of boredom/stress  Go for a walk, call a friend, chew gum, paint your nails, re-organize the garage, etc      LEAN PROTEIN SOURCES    Protein Source Portion Calories Grams of Protein                           Nonfat, plain Greek yogurt    (10 grams sugar or less) 3/4 cup (6 oz)  12-17   Light Yogurt (10 grams sugar or less) 3/4 cup (6 oz)  6-8   Protein Shake 1 shake 110-180 15-30   Skim/1% Milk or lactose-free milk 1 cup ( 8 oz)  8   Plain or light, flavored soymilk 1 cup  7-8   Plain or light, hemp milk 1 cup 110 6   Fat Free or 1% Cottage Cheese 1/2 cup 90 15   Part skim ricotta cheese 1/2 cup 100 14   Part skim or reduced fat cheese slices 1/4cup, 3 dice 65-80 8     Mozzarella String Cheese 1 80 8   Canned tuna, chicken, crab or salmon  (canned in water)  1/2 cup 100 15-20   White fish (broiled, grilled, baked) 3 ounces 100 21   [place]/Tuna (broiled, grilled, baked) 3 ounces 150-180 21   Shrimp, Scallops, Lobster, Crab 3 ounces 100 21   Pork loin, Pork Tenderloin 3 ounces 150 21   Boneless, skinless chicken /turkey breast                          (broiled, grilled, baked) 3 ounces 120 21   [place], [place], [place], and Venison 3 ounces 120 21   Lean cuts of red meat and pork (sirloin,   round, tenderloin, flank, ground 93%-96%) 3 ounces 170 21   Lean or Extra Lean Ground Turkey 1/2 cup 150 20   90-95% Lean [place] Burger 1 krupa 140-180 21   Low-fat casserole with lean meat 3/4 cup 200 17   Luncheon Meats                                                        (turkey, lean  ham, roast beef, chicken) 3 ounces 100 21   Egg (boiled, poached, scrambled) 1 Egg 60 7   Egg Substitute 1/2 cup 70 10   Nuts (limit to 1 serving per day)  3 Tbsp. 150 7   Nut Funk (peanut, almond)  Limit to 1 serving or less daily 1 Tbsp. 90 4   Soy Burger (varies) 1  10-15   Edamame  1/2 cup ~95 9   Garbanzo, Black, [place] Beans 1/2 cup 110 7   Refried Beans 1/2 cup 100 7   Kidney and Lima beans 1/2 cup 110 7   Tempeh 3 oz 175 18   Vegan crumbles 1/2 cup 100 14   Tofu 1/2 cup 110 14   Chili (beans and extra lean beef or turkey) 1 cup 200 23   Lentil Stew/Soup 1 cup 150 12   Black Bean Soup 1 cup 175 12     Carbohydrates  Carbohydrates fuel your body with glucose (sugar)--the energy your body needs so you can do your daily activities.  Carbohydrates offer an immediate source of energy for your body. They provide the fuel for your muscles and organs, such as your brain.     Types of Carbohydrates     Complex Carbohydrates are higher in fiber and keep you feeling full longer--helping you eat less.   These are found in nearly all plant-based foods and usually take longer for the body to digest.  They are most commonly found in whole-wheat bread, whole-grain pasta, brown rice, starchy vegetables,   and fruits  Refined Carbohydrates require almost NO WORK for digestion and break down into glucose more quickly   than complex carbohydrates. Refined carbohydrates are usually high in calories and low in nutrients and fiber--  eating more of these can lead to weight gain.  Thinking about eliminating carbohydrates???  If you do not eat enough carbohydrates, the following can occur:  Fatigue  Muscle cramps  Poor mental function  Fatigue easily results from deprivation of carbohydrates, which is seen in people who fast, possibly interfering with activities of daily living.      Thinking about eliminating carbohydrates???  If you do not eat enough carbohydrates, the following can occur:  Fatigue  Muscle cramps  Poor  mental function  Fatigue easily results from deprivation of carbohydrates, which is seen in people who fast, possibly interfering with activities of daily living    Carbohydrates are your body's first choice for fuel. If given a choice of several types of foods simultaneously, your body will use the energy from carbohydrates first.    What foods contain carbohydrates?  Choose the following foods containing carbohydrates (the BEST ones to eat):   Fruit-fresh, frozen, canned in their own juices  Whole grains:  Whole-wheat breads  Brown rice  Oatmeal  Whole-grain cereals  Other starchy foods containing a minimum of 3 grams (g) fiber/100 calories  The ingredient label should list whole wheat or whole grain as one of the first ingredients (bulgur, quinoa, buckwheat, millet, spelt, faro, kasha)  Milk or yogurt (a natural source of carbohydrates):  Low-fat milk  Fat-free milk  Yogurt   Beans or legumes     Starchy vegetables, raw or frozen:  Potatoes  Peas  Corn    AVOID or limit the following foods containing carbohydrates:  Refined sugars, such as in:  Candy  Desserts-ice cream, cakes, pies, brownies, frozen yogurt, sherbet/sorbet  Cookies  White flour: bread/pasta/crackers/rice/tortillas  Sugary snacks: sweetened cereal, granola bars, cereal bars, donuts, muffins, bagels  Sugary Drinks:  Fruit Juice, Smoothies  Sports Drinks  Regular Soda    What are typical serving sizes or portions?  The following are some serving and portion sizes for foods containing carbohydrates:  One medium piece of fruit, about 4?5 ounces (oz) (-tennis ball)  1 cup (C) berries or melon    C canned fruit    C juice (100% vegetable)    C starchy vegetables, cooked or chopped  One slice whole-grain bread  ? C brown rice, quinoa, buckwheat, millet, spelt, faro, kasha    C oatmeal (dry)    C bulgur  One small tortilla (less than 6inch diameter)    C wheat germ  1 oz pretzels     C flaked cereal        Calorie-Controlled Sample Meal Plans    Examples  of small healthy meals    Breakfast   Omelet made with   cup to   cup egg substitute or 2 eggs    cup chopped vegetables  1-2 tbsp. of light cheese     cup salsa  Medium banana    1 cup non-fat plain, Greek yogurt mixed with 1 cup berries and 1-2 Tbsp nuts or cereal   -3/4 cup skim or 1% cottage cheese    cup unsweetened whole-grain cereal  1/2 cup of fresh strawberries  Whole-wheat English muffin or mini bagel, 1 scrambled egg and 1 slice Swiss cheese   Small orange  Protein Bar or Shake (15-30 grams protein and 15-25 grams Carbohydrates)    cup cottage cheese, low-fat    cup fresh fruit    11 ounces of Slim Fast Low Carb (only), Duane's Advantage, EAS Carb Control    Lunch/Dinner  2-3 slices roasted turkey breast  1 tbsp. of fat free mayonnaise  2 slices of  whole-wheat bread, Medium apple  10 baby carrots with 1 tbsp. of low-fat dip     cup water packed tuna or chicken  1 tablespoons of low-fat mayonnaise  1-2 tbsp. dill relish  1 serving of whole-grain crackers  1 cup of strawberries   6 inch turkey sub sandwich with light mayonnaise,   cup cottage cheese                                                                                                                                                      Black bean and low-fat cheese on a whole wheat tortilla with salsa and light sour cream  Grilled chicken sandwich  Tossed salad with light dressing    Baked potato with 3/4 cup of extra lean ground beef, light shredded cheese and salsa  Fresh fruit                                                 Chicken chunks with lettuce and vegetables stuffed in keila  Steamed broccoli                                                 3 oz boneless/skinless chicken breast  1/2 cup brown rice with stir-fried vegetables    grapefruit  3 ounces of salmon, trout, or tuna  1 cup of steamed asparagus  1 small slice whole grain Italian bread  Broiled white or pink fish  3/4 cup whole wheat pasta with tomatoes  3/4 cup of roasted red  peppers  3 oz. of extra lean (93/7) hamburger on a [employer] Thins  Tossed salad with light dressing       Black bean or [place] bean soup with grated mozzarella cheese    of a flour tortilla    3 ounces of grilled pork loin with 1 tbsp. of low-sugar barbeque sauce, 1 cup of green beans seasoned with pepper  Small dinner roll or   cup of grapefruit sections    1-2 cups of torn saumya    cup of garbanzo beans or diced skinless chicken breast  5-6 cherry tomatoes  1  tbsp. of crumbled feta cheese  1 tbsp. of roasted soy nuts  1 tsp. of olive oil and 2-3 Tbsp. of balsamic or red wine vinegar  Small whole-wheat dinner roll or   cup of cut up pineapple       High Fiber Foods    1. Pears (3.1 grams)  Pears are both tasty and nutritious and can satisfy a sweet tooth. They are also a good source of fiber.    Fiber content: 5.5 grams in a medium-sized, raw pear, or 3.1 grams per 100 grams.    2. Strawberries (2 grams)  Strawberries are a delicious, healthy option for eating fresh as a summer dessert or as an office snack.    As well as fiber, they also contain vitamin C, manganese, and various antioxidants.    Fiber content: 3 grams in 1 cup of fresh strawberries, or 2 grams per 100 grams    3. Avocado (6.7 grams)  The avocado is high in healthy fats and a good source of fiber.    It also provides vitamin C, potassium, magnesium, vitamin E, and various B vitamins.    Fiber content: 10 grams in 1 cup of raw avocado, or 6.7 grams per 100 grams    4. Oats (10.1 grams)  Oats are an excellent source of fiber and are high in vitamins, minerals, and antioxidants.    They contain a powerful soluble fiber called beta glucan, which may help manage blood sugar and cholesterol levels.    Fiber content: 16.5 grams per cup of raw oats, or 10.1 grams per 100 grams    5. Apples (2.4 grams)  Apples are a tasty and satisfying fruit. Eaten whole, they also provide both soluble and insoluble fiber.    Fiber content: 4.4 grams in a  medium-sized, raw apple, or 2.4 grams per 100 grams    6. Raspberries (6.5 grams)  Raspberries are a nutritious fruit with a distinctive flavor. They contain fiber, vitamin C, and manganese.    Fiber content: One cup of raw raspberries contains 8 grams of fiber, or 6.5 grams per 100 grams    Other high-fiber berries  Here are some other berries you can add to desserts, oatmeal, and smoothies or just snack on during the day:    Blueberries: 2.4 grams per 100-gram serving  Blackberries: 5.3 grams per 100-gram serving    7. Bananas (2.6 grams)  Bananas provide many nutrients, including vitamin C, vitamin B6, and potassium.    A green or unripe banana also contains a significant amount of resistant starch, an indigestible carbohydrate that functions like fiber.    Fiber content: 3.1 grams in a medium-sized banana, or 2.6 grams per 100 grams    8. Carrots (2.8 grams)  The carrot is a root vegetable you can eat raw or cooked.    In addition to fiber, carrots provide vitamin K, vitamin B6, magnesium, and beta carotene, an antioxidant that gets turned into vitamin A in your body.    Fiber content: 3.6 grams in 1 cup of raw carrots, or 2.8 grams per 100 grams    9. Beets (2 grams)  The beet, or beetroot, is a root vegetable that contains valuable nutrients, such as folate, iron, copper, manganese, and potassium.    Beets also provide inorganic nitrates, nutrients that may have benefits for blood pressure regulation and exercise performance.    Fiber content: 3.8 grams per cup of raw beets, or 2 grams per 100 grams    10. Broccoli (2.6 grams)  Broccoli is a type of cruciferous vegetable and a nutrient-dense food.    It provides fiber and also contains vitamin C, vitamin K, folate, B vitamins, potassium, iron, and manganese. It also contains antioxidants and other nutrients that may help fight cancer. Broccoli is also relatively high in protein, compared with other vegetables.    Fiber content: 2.4 grams per cup, or 2.6 grams  per 100 grams    11. Artichoke (5.4 grams)  Artichokes are high in many nutrients and are a good source of fiber.    Fiber content: 6.9 grams in 1 raw globe or Welsh artichoke, or 5.4 grams per 100 grams    12. [place] sprouts (3.8 grams)  [place] sprout are cruciferous vegetables related to broccoli.    They contain fiber and are also high in vitamin K, potassium, folate, and potentially cancer-fighting antioxidants.    Fiber content: 3.3 grams per cup of raw [place] sprouts, or 3.8 grams per 100 grams    Try a recipe for [place] sprouts roasted with apples and russell.    Other high fiber vegetables  Most vegetables contain significant amounts of fiber.    Other notable examples include:    Kale: 4.1 grams  Spinach: 2.2 grams  Tomatoes: 1.2 grams    13. Lentils (10.7 grams)  Lentils are economical, versatile, and highly nutritious. They are a good source of fiber, protein, and many other nutrients.    Fiber content: 13.1 grams per cup of cooked lentils, or 10.7 grams per 100 grams    Try this lentil soup with cumin, coriander, turmeric, and cinnamon.    14. Kidney beans (7.4 grams)  Kidney beans are a popular type of legume. Like other legumes, they provide plant-based protein and various nutrients.    Fiber content: 12.2 grams per cup of cooked beans, or 7.4 per 100 grams    15. Split peas (8.3 grams)  Split peas are made from the dried, split, and peeled seeds of peas. They re often seen in split pea soup served alongside ham, but can be used in dhals and other recipes.    Fiber content: 16.3 grams per cup of cooked split peas, or 8.3 per 100 grams    16. Chickpeas (7 grams)  The chickpea is another type of legume that s rich in fiber and also provides protein and various minerals    Chickpeas feature in hummus, curries, soups, and many other dishes.    Fiber content: 12.5 grams per cup of cooked chickpeas, or 7.6 per 100 grams    Other high fiber legumes  Most legumes are high in protein, fiber, and various  nutrients. Prepared correctly, they offer a tasty and economical source of quality nutrition.    Other high fiber legumes include:    Cooked black beans: 8.7 grams  Cooked edamame: 5.2 grams  Cooked lima beans: 7 grams  Baked beans: 5.5 grams    17. Quinoa (2.8 grams)  Quinoa is a pseudo-cereal that provides fiber and is a useful source of protein for those on a plant-based diet.    It also contains magnesium, iron, zinc, potassium, and antioxidants, to name a few.    Fiber content: 5.2 grams per cup of cooked quinoa, or 2.8 per 100 grams    18. Popcorn (14.5 grams)  Popcorn can be a fun and healthy way to increase fiber.    Air-popped popcorn is very high in fiber, calorie for calorie. However, if you add fat or sugar, the fiber-to-calorie ratio will start to decrease significantly.    Fiber content: 1.15 grams per cup of air-popped popcorn, or 14.5 grams per 100 grams    Other high fiber grains  Nearly all whole grains are high in fiber.    19. Almonds (13.3 grams)  Almonds are high in many nutrients, including healthy fats, vitamin E, manganese, and magnesium.    They can also be made into almond flour for baking.    Fiber content: 4 grams per 3 tablespoons, or 13.3 grams per 100 grams    20. Kinza seeds (34.4 grams)  Kinza seeds are highly nutritious, tiny black seeds. They are an excellent source of fiber and contain high amounts of magnesium, phosphorus, and calcium.    Try kinza seeds mixed into jam or add them to homemade granola bars.    Fiber content: 9.75 grams per ounce of dried kinza seeds, or 34.4 grams per 100 grams    Other high fiber nuts and seeds  Most nuts and seeds contain significant amounts of fiber.    Examples include:    Fresh coconut: 9 grams  Pistachios: 10.6 grams  Walnuts: 6.7 grams  Sunflower seeds: 8.6 grams  Pumpkin seeds: 6 grams  All values are for a 100-gram portion.    21. Sweet potatoes (3 grams)  The sweet potato is a popular tuber that s very filling and has a sweet flavor. It s  high in beta carotene, B vitamins, and various minerals.    Sweet potatoes can be a tasty bread substitute or base for nachos.    Fiber content: A medium-sized boiled sweet potato (without skin) has 3.8 g       Stretching Your Food Dollar     Shopping on a budget has become more difficult. Below are some tips and recipes to help you save your food dollars.    Plan out your meals. Making a grocery list can help you spend less time and money at the grocery store. This can help you stick to foods that you only need for your weekly menu.   Before you shop, check what you have at home in the pantry, fridge and freezer to plan your meals around foods you already have at home.   Shop in season to get fresh produce at a lower price. Also, shopping at the local farmers  markets can help you buy produce in bulk at a lower cost.   If you are a WIC Program participant, ask your agency about coupons that can be used at local farmers  markets through the [employer] ([employer]).    Buy family size packs or bulk to help save later. Lunchmeat, animal proteins, cheese, and various produce items can be purchased in large quantities, then divided and frozen for later use.      Shopping in Season     Spring   Asparagus   Beets   Horseradish   Lettuces & greens   Parsnips   Peas (late)     Summer   Blackberries   Blueberries   Boysenberries   Broccoli   Cabbage   Cauliflower   Cherries   Cucumbers   Eggplant   Gooseberries   Grapes   Melons   Nectarines   Peaches   Peas   Raspberries   Rhubarb   Strawberries   String beans   Summer squash   Sweet corn   Tomatoes     Fall   Apples   Beets   Broccoli   Cabbage   Cauliflower   Cucumbers   Eggplant   Grapes   Lettuces & greens   Melons   Okra   Pears   Plums (early)   Pumpkins   Radishes   Rhubarb (early)   String beans (early)   Summer & Winter squash   Sweet corn   Sweet potatoes   Tomatoes   Turnips     Winter   Chicories   Horseradish   Salsify   Winter squash (early)        Recipes (courtesy of the USDA):     Breakfast Burritos     Bring this Breakfast Burrito with you as you start your day.     Yield: 4 burritos  Prep time:10 minutes  Cook time:15 minutes  Total time: 25 minutes     Ingredients   4 eggs   1/4 cup milk (1 %)   salt (to taste)   pepper (to taste)   chili powder (to taste)   1 teaspoon oil   4 tortillas (10-inch flour tortillas)   1 cup beans (fat-free refried beans)   1/2 cup cheddar cheese (grated, 4 oz)   1 tomato (chopped)     Steps   Mix eggs, milk and seasonings in a bowl.   Heat oil in skillet over medium-high heat (350 degrees in an electric skillet).   Stir in the eggs and cook until firm.   Warm the tortillas on a griddle, or wrap in foil and heat in the oven.   Warm the refried beans in a separate pan.   On each tortilla, layer 1/4 of refried beans, eggs, cheese, and tomato.   Roll the burrito, cut in half, and enjoy!   Refrigerate leftovers within 2 hours.     Notes   Try your favorite salsa in place of the tomato.   Use other vegetables that you have in the refrigerator, such as green or red bell peppers, or cooked corn.   Serve with guacamole or light sour cream.     ________________________        Veggie Omelet in a Mug     Making a veggie omelet has never been so easy! Combine all of your ingredients in a mug, pop it in the microwave, and breakfast is ready!     Yield:1 serving  Prep time:10 minutes  Cook time: 3 minutes  Total time:13 minutes     Ingredients   2 eggs   2 tablespoons milk (low-fat or nonfat/skim)   1 pinch salt   1 pinch pepper   Mushrooms (finely chopped)   2 tablespoons cheddar cheese     Steps   Wash hands with soap and water.   Lightly grease the inside of a 12-ounce microwave-safe mug.   Use a fork to combine the eggs, milk, salt and pepper in the mug and stir well.  Mix in the vegetables and cheese.   Microwave on HIGH for 45 seconds. Stir. Return to the microwave and cook on HIGH until the mixture has puffed and set, 60  to 90 seconds. The omelet may look wet on the top but it will dry as it cools.   Refrigerate leftovers within 2 hours.     Notes   Customize your quick omelet with your favorite vegetable.     _________________________     [place] Salad Mix     This protein-packed salmon salad mix takes only 10 minutes to make!      Yield: 2 cups  Prep time: 10 minutes     Ingredients   1 can salmon (14.75 oz, drained)   1 cup pickle relish (dill or sweet or chopped pickles)   1 cup plain yogurt (nonfat)   2 tablespoons mayonnaise (light)   2 tablespoons lemon juice (about 1/2 lemon)     Steps   1. Remove skin and large bones from salmon. In a medium bowl, break up salmon and mash small bones with a fork.   2. Add relish, yogurt, mayonnaise and lemon juice.   3. Mix together until well combined.   4. Chill before serving. Serve on a bed of salad greens or use as a sandwich filling.   5. Refrigerate leftovers within 2 hours.     Notes   Small bones are soft after milly. They can be mashed and eaten for more calcium.     _____________________________     Easy Bean Soup     Cook a healthy meal in less than 10 minutes with pantry staples.      Yield: 4 servings  Prep time: 5 minutes  Cook time:10 minutes  Total time:15 minutes     Ingredients   1 tablespoon olive oil   1 teaspoon garlic (minced)   1/4 cup onion (finely chopped)   2 cans beans (15.8 oz cans, great northern beans, rinsed and drained)   1 can diced tomatoes (14.5 oz can, with basil, garlic and oregano)   1 can vegetable stock (14 oz can, low-sodium)   4 cups leafy greens (kale or spinach - torn into small pieces )   1 tablespoon lemon juice   1/2 cup grated parmesan cheese     Steps   In a medium saucepan, heat oil over medium heat and sauté garlic and onion for 3 minutes or until onion is tender.   Add beans, tomatoes and broth to saucepan. Stir and simmer for 5 minutes. Add kale and cook until tender, for about 2 minutes.   Mix in lemon juice and Parmesan cheese  just before serving. Optional, garnish with finely chopped fresh basil or dried basil.     Notes   Cooked, dried beans may be substituted for canned beans. Using prepared dry beans in place of canned will reduce sodium in this dish. If you can t find diced tomatoes with basil, garlic and oregano, use regular diced tomatoes and add dried versions of these seasonings.     _____________________________________     [facility] [place] Stuffed Potatoes     Cook these customizable potatoes in less than 15 minutes.     Yield: 4  Prep time: 5 minutes  Cook time:10 minutes  Total time:15 minutes     Ingredients   2 medium potatoes   1 cup black beans (rinsed, drained)   3/4 cup salsa   1 cup corn   1/2 cup cheese (try cheddar, pepper marianne, or Mexican blend)     Steps   Scrub potatoes well. Poke each potato with a fork 2 or 3 times. Microwave on high for 5 minutes, turn potatoes over, and microwave another 3 to 5 minutes, or until easily pierced with a fork. Set aside.   In a microwave safe bowl, combine beans, salsa and corn. Microwave for 2-3 minutes, stirring occasionally, until heated through.   Cut potatoes in half length-wise and flatten with a fork. Divide bean mixture between the four halves. Sprinkle with cheese and serve warm.   Refrigerate leftovers within 2 hours.     Notes   Try kidney or grijalva beans instead of black beans.   Additional topping ideas: hot sauce, avocado, black olives, green onion, cooked meat or tofu, plain low-fat yogurt or sour cream.   Increase the protein by adding shredded or canned chicken.     __________________________________     Ramen Noodle Skillet     Elevate your package of noodles with healthier homemade seasoning and vegetables.      Yield : 4 servings     Ingredients   Homemade Ramen Seasoning   1 teaspoon ground veronica   1 teaspoon garlic powder   1 teaspoon onion powder   1/4 teaspoon salt (or to taste)   1/4 teaspoon black pepper (or to taste)     Ramen Noodle Skillet   2 teaspoons  canola oil (or vegetable)   1 cup onion (chopped)   1 medium carrots (chopped or sliced)   2 cups broccoli (frozen stir mcfadden mixture)   2 cups tofu (cut into bite-sized pieces, cooked)   1 package ramen-style noodles (soy flavored, broken into pieces, discard seasoning packet)   1 Homemade Ramen Seasoning   2 cups vegetable broth (sodium-free)     Steps   Heat oil in a large skillet. Add onion and carrots and sauté until soft (about 5 minutes).   Thaw the broccoli mixture in the microwave and drain.   Add the broccoli mixture and cooked tofu to the skillet. Stir and heat (about 1-2 minutes).   Add the homemade ramen seasoning to the broth and stir into the skillet.   Break apart the ramen noodles. Add to the skillet when the broth simmers. Stir to moisten the noodles.   Cover the skillet and cook until done (about 2 minutes). Serve immediately.     Notes   Try different protein options such as a hard boiled egg, shredded or canned chicken, or edamame.

## 2025-08-04 ENCOUNTER — HOSPITAL ENCOUNTER (OUTPATIENT)
Dept: ULTRASOUND IMAGING | Facility: HOSPITAL | Age: 70
Discharge: HOME | End: 2025-08-04
Attending: EMERGENCY MEDICINE | Admitting: EMERGENCY MEDICINE
Payer: MEDICARE

## 2025-08-04 DIAGNOSIS — R19.00 PELVIC MASS: ICD-10-CM

## 2025-08-04 PROCEDURE — 76856 US EXAM PELVIC COMPLETE: CPT

## 2025-08-06 ENCOUNTER — RESULTS FOLLOW-UP (OUTPATIENT)
Dept: SURGERY | Facility: CLINIC | Age: 70
End: 2025-08-06
Payer: MEDICARE

## 2025-08-06 DIAGNOSIS — R19.00 PELVIC MASS: Primary | ICD-10-CM

## 2025-08-06 DIAGNOSIS — D39.11 NEOPLASM OF UNCERTAIN BEHAVIOR OF RIGHT OVARY: ICD-10-CM

## 2025-08-14 ENCOUNTER — TELEPHONE (OUTPATIENT)
Dept: SURGERY | Facility: CLINIC | Age: 70
End: 2025-08-14
Payer: MEDICARE

## 2025-08-15 DIAGNOSIS — I10 ESSENTIAL HYPERTENSION: ICD-10-CM

## 2025-08-19 RX ORDER — HYDROCHLOROTHIAZIDE 25 MG/1
25 TABLET ORAL DAILY
Qty: 90 TABLET | Refills: 0 | Status: SHIPPED | OUTPATIENT
Start: 2025-08-19

## 2025-08-21 ENCOUNTER — TRANSFERRED RECORDS (OUTPATIENT)
Dept: HEALTH INFORMATION MANAGEMENT | Facility: CLINIC | Age: 70
End: 2025-08-21
Payer: MEDICARE

## 2025-08-21 DIAGNOSIS — I10 ESSENTIAL HYPERTENSION: ICD-10-CM

## 2025-08-21 LAB — RETINOPATHY: NEGATIVE

## 2025-08-21 RX ORDER — AMLODIPINE BESYLATE 5 MG/1
5 TABLET ORAL DAILY
Qty: 90 TABLET | Refills: 1 | Status: SHIPPED | OUTPATIENT
Start: 2025-08-21

## 2025-08-23 DIAGNOSIS — E78.00 HYPERCHOLESTEREMIA: ICD-10-CM

## 2025-08-25 RX ORDER — PRAVASTATIN SODIUM 80 MG/1
80 TABLET ORAL DAILY
Qty: 90 TABLET | Refills: 0 | Status: SHIPPED | OUTPATIENT
Start: 2025-08-25

## 2025-08-28 ENCOUNTER — OFFICE VISIT (OUTPATIENT)
Dept: FAMILY MEDICINE | Facility: CLINIC | Age: 70
End: 2025-08-28
Payer: MEDICARE

## 2025-08-28 VITALS
HEIGHT: 61 IN | DIASTOLIC BLOOD PRESSURE: 68 MMHG | HEART RATE: 75 BPM | BODY MASS INDEX: 46.75 KG/M2 | SYSTOLIC BLOOD PRESSURE: 128 MMHG | OXYGEN SATURATION: 97 % | WEIGHT: 247.6 LBS | RESPIRATION RATE: 24 BRPM | TEMPERATURE: 98.8 F

## 2025-08-28 DIAGNOSIS — E66.01 MORBID OBESITY (H): ICD-10-CM

## 2025-08-28 DIAGNOSIS — N18.31 CHRONIC KIDNEY DISEASE, STAGE 3A (H): ICD-10-CM

## 2025-08-28 DIAGNOSIS — E11.42 TYPE 2 DIABETES MELLITUS WITH DIABETIC POLYNEUROPATHY, WITHOUT LONG-TERM CURRENT USE OF INSULIN (H): ICD-10-CM

## 2025-08-28 DIAGNOSIS — N83.8 OVARIAN MASS: Primary | ICD-10-CM

## 2025-08-28 DIAGNOSIS — I10 ESSENTIAL HYPERTENSION: ICD-10-CM

## 2025-08-28 DIAGNOSIS — K43.9 VENTRAL HERNIA WITHOUT OBSTRUCTION OR GANGRENE: ICD-10-CM

## (undated) RX ORDER — PROPOFOL 10 MG/ML
INJECTION, EMULSION INTRAVENOUS
Status: DISPENSED
Start: 2024-05-10